# Patient Record
Sex: MALE | Race: BLACK OR AFRICAN AMERICAN | NOT HISPANIC OR LATINO | URBAN - METROPOLITAN AREA
[De-identification: names, ages, dates, MRNs, and addresses within clinical notes are randomized per-mention and may not be internally consistent; named-entity substitution may affect disease eponyms.]

---

## 2022-06-15 ENCOUNTER — INPATIENT (INPATIENT)
Facility: HOSPITAL | Age: 52
LOS: 5 days | Discharge: HOME | DRG: 291 | End: 2022-06-21
Attending: STUDENT IN AN ORGANIZED HEALTH CARE EDUCATION/TRAINING PROGRAM | Admitting: HOSPITALIST
Payer: SELF-PAY

## 2022-06-15 VITALS
TEMPERATURE: 98 F | OXYGEN SATURATION: 98 % | RESPIRATION RATE: 18 BRPM | HEART RATE: 101 BPM | SYSTOLIC BLOOD PRESSURE: 147 MMHG | WEIGHT: 240.08 LBS | DIASTOLIC BLOOD PRESSURE: 106 MMHG

## 2022-06-15 DIAGNOSIS — E78.5 HYPERLIPIDEMIA, UNSPECIFIED: ICD-10-CM

## 2022-06-15 DIAGNOSIS — I10 ESSENTIAL (PRIMARY) HYPERTENSION: ICD-10-CM

## 2022-06-15 DIAGNOSIS — Z02.9 ENCOUNTER FOR ADMINISTRATIVE EXAMINATIONS, UNSPECIFIED: ICD-10-CM

## 2022-06-15 DIAGNOSIS — N17.9 ACUTE KIDNEY FAILURE, UNSPECIFIED: ICD-10-CM

## 2022-06-15 DIAGNOSIS — I50.23 ACUTE ON CHRONIC SYSTOLIC (CONGESTIVE) HEART FAILURE: ICD-10-CM

## 2022-06-15 DIAGNOSIS — Z98.890 OTHER SPECIFIED POSTPROCEDURAL STATES: Chronic | ICD-10-CM

## 2022-06-15 LAB
ALBUMIN SERPL ELPH-MCNC: 3.5 G/DL — SIGNIFICANT CHANGE UP (ref 3.3–5.2)
ALP SERPL-CCNC: 96 U/L — SIGNIFICANT CHANGE UP (ref 40–120)
ALT FLD-CCNC: 60 U/L — HIGH
ANION GAP SERPL CALC-SCNC: 10 MMOL/L — SIGNIFICANT CHANGE UP (ref 5–17)
ANION GAP SERPL CALC-SCNC: 11 MMOL/L — SIGNIFICANT CHANGE UP (ref 5–17)
ANISOCYTOSIS BLD QL: SLIGHT — SIGNIFICANT CHANGE UP
APTT BLD: 28.6 SEC — SIGNIFICANT CHANGE UP (ref 27.5–35.5)
AST SERPL-CCNC: 42 U/L — HIGH
BASOPHILS # BLD AUTO: 0.08 K/UL — SIGNIFICANT CHANGE UP (ref 0–0.2)
BASOPHILS NFR BLD AUTO: 0.9 % — SIGNIFICANT CHANGE UP (ref 0–2)
BILIRUB SERPL-MCNC: 1 MG/DL — SIGNIFICANT CHANGE UP (ref 0.4–2)
BUN SERPL-MCNC: 26.2 MG/DL — HIGH (ref 8–20)
BUN SERPL-MCNC: 28.8 MG/DL — HIGH (ref 8–20)
CALCIUM SERPL-MCNC: 8.2 MG/DL — LOW (ref 8.6–10.2)
CALCIUM SERPL-MCNC: 8.6 MG/DL — SIGNIFICANT CHANGE UP (ref 8.6–10.2)
CHLORIDE SERPL-SCNC: 100 MMOL/L — SIGNIFICANT CHANGE UP (ref 98–107)
CHLORIDE SERPL-SCNC: 102 MMOL/L — SIGNIFICANT CHANGE UP (ref 98–107)
CO2 SERPL-SCNC: 28 MMOL/L — SIGNIFICANT CHANGE UP (ref 22–29)
CO2 SERPL-SCNC: 31 MMOL/L — HIGH (ref 22–29)
CREAT SERPL-MCNC: 1.52 MG/DL — HIGH (ref 0.5–1.3)
CREAT SERPL-MCNC: 1.53 MG/DL — HIGH (ref 0.5–1.3)
EGFR: 55 ML/MIN/1.73M2 — LOW
EGFR: 55 ML/MIN/1.73M2 — LOW
EOSINOPHIL # BLD AUTO: 0.23 K/UL — SIGNIFICANT CHANGE UP (ref 0–0.5)
EOSINOPHIL NFR BLD AUTO: 2.7 % — SIGNIFICANT CHANGE UP (ref 0–6)
GIANT PLATELETS BLD QL SMEAR: PRESENT — SIGNIFICANT CHANGE UP
GLUCOSE SERPL-MCNC: 125 MG/DL — HIGH (ref 70–99)
GLUCOSE SERPL-MCNC: 97 MG/DL — SIGNIFICANT CHANGE UP (ref 70–99)
HCT VFR BLD CALC: 41.8 % — SIGNIFICANT CHANGE UP (ref 39–50)
HGB BLD-MCNC: 13.5 G/DL — SIGNIFICANT CHANGE UP (ref 13–17)
HIV 1 & 2 AB SERPL IA.RAPID: SIGNIFICANT CHANGE UP
INR BLD: 1.83 RATIO — HIGH (ref 0.88–1.16)
LYMPHOCYTES # BLD AUTO: 2.26 K/UL — SIGNIFICANT CHANGE UP (ref 1–3.3)
LYMPHOCYTES # BLD AUTO: 26.8 % — SIGNIFICANT CHANGE UP (ref 13–44)
MACROCYTES BLD QL: SLIGHT — SIGNIFICANT CHANGE UP
MAGNESIUM SERPL-MCNC: 2.1 MG/DL — SIGNIFICANT CHANGE UP (ref 1.6–2.6)
MANUAL SMEAR VERIFICATION: SIGNIFICANT CHANGE UP
MCHC RBC-ENTMCNC: 23.9 PG — LOW (ref 27–34)
MCHC RBC-ENTMCNC: 32.3 GM/DL — SIGNIFICANT CHANGE UP (ref 32–36)
MCV RBC AUTO: 74 FL — LOW (ref 80–100)
MICROCYTES BLD QL: SLIGHT — SIGNIFICANT CHANGE UP
MONOCYTES # BLD AUTO: 0.52 K/UL — SIGNIFICANT CHANGE UP (ref 0–0.9)
MONOCYTES NFR BLD AUTO: 6.2 % — SIGNIFICANT CHANGE UP (ref 2–14)
NEUTROPHILS # BLD AUTO: 5.19 K/UL — SIGNIFICANT CHANGE UP (ref 1.8–7.4)
NEUTROPHILS NFR BLD AUTO: 61.6 % — SIGNIFICANT CHANGE UP (ref 43–77)
NT-PROBNP SERPL-SCNC: 9830 PG/ML — HIGH (ref 0–300)
PLAT MORPH BLD: NORMAL — SIGNIFICANT CHANGE UP
PLATELET # BLD AUTO: 288 K/UL — SIGNIFICANT CHANGE UP (ref 150–400)
POLYCHROMASIA BLD QL SMEAR: SIGNIFICANT CHANGE UP
POTASSIUM SERPL-MCNC: 3.3 MMOL/L — LOW (ref 3.5–5.3)
POTASSIUM SERPL-MCNC: 3.6 MMOL/L — SIGNIFICANT CHANGE UP (ref 3.5–5.3)
POTASSIUM SERPL-SCNC: 3.3 MMOL/L — LOW (ref 3.5–5.3)
POTASSIUM SERPL-SCNC: 3.6 MMOL/L — SIGNIFICANT CHANGE UP (ref 3.5–5.3)
PROT SERPL-MCNC: 6.5 G/DL — LOW (ref 6.6–8.7)
PROTHROM AB SERPL-ACNC: 21.3 SEC — HIGH (ref 10.5–13.4)
RAPID RVP RESULT: SIGNIFICANT CHANGE UP
RBC # BLD: 5.65 M/UL — SIGNIFICANT CHANGE UP (ref 4.2–5.8)
RBC # FLD: 16.7 % — HIGH (ref 10.3–14.5)
RBC BLD AUTO: NORMAL — SIGNIFICANT CHANGE UP
SARS-COV-2 RNA SPEC QL NAA+PROBE: SIGNIFICANT CHANGE UP
SMUDGE CELLS # BLD: PRESENT — SIGNIFICANT CHANGE UP
SODIUM SERPL-SCNC: 141 MMOL/L — SIGNIFICANT CHANGE UP (ref 135–145)
SODIUM SERPL-SCNC: 141 MMOL/L — SIGNIFICANT CHANGE UP (ref 135–145)
TROPONIN T SERPL-MCNC: 0.01 NG/ML — SIGNIFICANT CHANGE UP (ref 0–0.06)
TROPONIN T SERPL-MCNC: 0.01 NG/ML — SIGNIFICANT CHANGE UP (ref 0–0.06)
VARIANT LYMPHS # BLD: 1.8 % — SIGNIFICANT CHANGE UP (ref 0–6)
WBC # BLD: 8.42 K/UL — SIGNIFICANT CHANGE UP (ref 3.8–10.5)
WBC # FLD AUTO: 8.42 K/UL — SIGNIFICANT CHANGE UP (ref 3.8–10.5)

## 2022-06-15 PROCEDURE — 93010 ELECTROCARDIOGRAM REPORT: CPT

## 2022-06-15 PROCEDURE — 99223 1ST HOSP IP/OBS HIGH 75: CPT

## 2022-06-15 PROCEDURE — 99053 MED SERV 10PM-8AM 24 HR FAC: CPT

## 2022-06-15 PROCEDURE — 71046 X-RAY EXAM CHEST 2 VIEWS: CPT | Mod: 26

## 2022-06-15 PROCEDURE — 99285 EMERGENCY DEPT VISIT HI MDM: CPT

## 2022-06-15 RX ORDER — METOPROLOL TARTRATE 50 MG
1 TABLET ORAL
Qty: 0 | Refills: 0 | DISCHARGE

## 2022-06-15 RX ORDER — METOPROLOL TARTRATE 50 MG
50 TABLET ORAL DAILY
Refills: 0 | Status: DISCONTINUED | OUTPATIENT
Start: 2022-06-15 | End: 2022-06-21

## 2022-06-15 RX ORDER — FAMOTIDINE 10 MG/ML
1 INJECTION INTRAVENOUS
Qty: 0 | Refills: 0 | DISCHARGE

## 2022-06-15 RX ORDER — ASPIRIN/CALCIUM CARB/MAGNESIUM 324 MG
1 TABLET ORAL
Qty: 0 | Refills: 0 | DISCHARGE

## 2022-06-15 RX ORDER — FUROSEMIDE 40 MG
80 TABLET ORAL ONCE
Refills: 0 | Status: COMPLETED | OUTPATIENT
Start: 2022-06-15 | End: 2022-06-15

## 2022-06-15 RX ORDER — SACUBITRIL AND VALSARTAN 24; 26 MG/1; MG/1
1 TABLET, FILM COATED ORAL EVERY 12 HOURS
Refills: 0 | Status: DISCONTINUED | OUTPATIENT
Start: 2022-06-15 | End: 2022-06-20

## 2022-06-15 RX ORDER — FAMOTIDINE 10 MG/ML
20 INJECTION INTRAVENOUS
Refills: 0 | Status: DISCONTINUED | OUTPATIENT
Start: 2022-06-15 | End: 2022-06-21

## 2022-06-15 RX ORDER — ATORVASTATIN CALCIUM 80 MG/1
1 TABLET, FILM COATED ORAL
Qty: 0 | Refills: 0 | DISCHARGE

## 2022-06-15 RX ORDER — SACUBITRIL AND VALSARTAN 24; 26 MG/1; MG/1
1 TABLET, FILM COATED ORAL
Refills: 0 | Status: DISCONTINUED | OUTPATIENT
Start: 2022-06-15 | End: 2022-06-15

## 2022-06-15 RX ORDER — ATORVASTATIN CALCIUM 80 MG/1
40 TABLET, FILM COATED ORAL AT BEDTIME
Refills: 0 | Status: DISCONTINUED | OUTPATIENT
Start: 2022-06-15 | End: 2022-06-21

## 2022-06-15 RX ORDER — PANTOPRAZOLE SODIUM 20 MG/1
40 TABLET, DELAYED RELEASE ORAL
Refills: 0 | Status: DISCONTINUED | OUTPATIENT
Start: 2022-06-15 | End: 2022-06-21

## 2022-06-15 RX ORDER — FUROSEMIDE 40 MG
80 TABLET ORAL
Refills: 0 | Status: DISCONTINUED | OUTPATIENT
Start: 2022-06-15 | End: 2022-06-17

## 2022-06-15 RX ORDER — ASPIRIN/CALCIUM CARB/MAGNESIUM 324 MG
325 TABLET ORAL ONCE
Refills: 0 | Status: COMPLETED | OUTPATIENT
Start: 2022-06-15 | End: 2022-06-15

## 2022-06-15 RX ORDER — ATORVASTATIN CALCIUM 80 MG/1
40 TABLET, FILM COATED ORAL DAILY
Refills: 0 | Status: DISCONTINUED | OUTPATIENT
Start: 2022-06-15 | End: 2022-06-15

## 2022-06-15 RX ORDER — FAMOTIDINE 10 MG/ML
20 INJECTION INTRAVENOUS
Refills: 0 | Status: DISCONTINUED | OUTPATIENT
Start: 2022-06-15 | End: 2022-06-15

## 2022-06-15 RX ORDER — POTASSIUM CHLORIDE 20 MEQ
40 PACKET (EA) ORAL ONCE
Refills: 0 | Status: COMPLETED | OUTPATIENT
Start: 2022-06-15 | End: 2022-06-15

## 2022-06-15 RX ORDER — METOPROLOL TARTRATE 50 MG
5 TABLET ORAL ONCE
Refills: 0 | Status: COMPLETED | OUTPATIENT
Start: 2022-06-15 | End: 2022-06-15

## 2022-06-15 RX ORDER — ASPIRIN/CALCIUM CARB/MAGNESIUM 324 MG
81 TABLET ORAL DAILY
Refills: 0 | Status: DISCONTINUED | OUTPATIENT
Start: 2022-06-15 | End: 2022-06-21

## 2022-06-15 RX ORDER — SACUBITRIL AND VALSARTAN 24; 26 MG/1; MG/1
1 TABLET, FILM COATED ORAL ONCE
Refills: 0 | Status: COMPLETED | OUTPATIENT
Start: 2022-06-15 | End: 2022-06-15

## 2022-06-15 RX ADMIN — PANTOPRAZOLE SODIUM 40 MILLIGRAM(S): 20 TABLET, DELAYED RELEASE ORAL at 13:16

## 2022-06-15 RX ADMIN — Medication 81 MILLIGRAM(S): at 13:15

## 2022-06-15 RX ADMIN — Medication 80 MILLIGRAM(S): at 13:16

## 2022-06-15 RX ADMIN — Medication 80 MILLIGRAM(S): at 04:36

## 2022-06-15 RX ADMIN — FAMOTIDINE 20 MILLIGRAM(S): 10 INJECTION INTRAVENOUS at 09:29

## 2022-06-15 RX ADMIN — Medication 50 MILLIGRAM(S): at 09:29

## 2022-06-15 RX ADMIN — SACUBITRIL AND VALSARTAN 1 TABLET(S): 24; 26 TABLET, FILM COATED ORAL at 21:07

## 2022-06-15 RX ADMIN — Medication 40 MILLIEQUIVALENT(S): at 04:36

## 2022-06-15 RX ADMIN — SACUBITRIL AND VALSARTAN 1 TABLET(S): 24; 26 TABLET, FILM COATED ORAL at 13:15

## 2022-06-15 RX ADMIN — Medication 325 MILLIGRAM(S): at 04:35

## 2022-06-15 RX ADMIN — ATORVASTATIN CALCIUM 40 MILLIGRAM(S): 80 TABLET, FILM COATED ORAL at 21:07

## 2022-06-15 RX ADMIN — Medication 5 MILLIGRAM(S): at 23:43

## 2022-06-15 NOTE — ED PROVIDER NOTE - CARE PLAN
1 Principal Discharge DX:	Acute on chronic systolic congestive heart failure  Secondary Diagnosis:	Mild renal insufficiency

## 2022-06-15 NOTE — ED ADULT TRIAGE NOTE - CHIEF COMPLAINT QUOTE
pt c/o swollen testicles and pain, pt states he takes water pills and feel like his water pills are not working, hx of HTN

## 2022-06-15 NOTE — ED ADULT NURSE REASSESSMENT NOTE - NS ED NURSE REASSESS COMMENT FT1
Dr. Betancourt at bedside to evaluate patient.  Dr. Betancourt made aware of hypertension and c/o GERD.

## 2022-06-15 NOTE — CONSULT NOTE ADULT - SUBJECTIVE AND OBJECTIVE BOX
[facility], THE [facility]ART [facility]ENTER                                   [address] [place] [address]                                                      PHONE: [phone]                                                         FAX: [phone]  [email or web address]  ---------------------------------------------------------------------------------------------------------------------------------    Reason for Consult: SOB LO LE EDEMA    HPI:  JENNIFER BARRIENTOS is an 52y Male with recently diagnosed     PAST MEDICAL & SURGICAL HISTORY:  CHF, chronic      Hypertension      Hyperlipidemia      S/P hernia repair          No Known Allergies      MEDICATIONS  (STANDING):  aspirin enteric coated 81 milliGRAM(s) Oral daily  atorvastatin 40 milliGRAM(s) Oral at bedtime  furosemide   Injectable 80 milliGRAM(s) IV Push two times a day  metoprolol succinate ER 50 milliGRAM(s) Oral daily  pantoprazole    Tablet 40 milliGRAM(s) Oral before breakfast  sacubitril 24 mG/valsartan 26 mG 1 Tablet(s) Oral two times a day    MEDICATIONS  (PRN):  famotidine Injectable 20 milliGRAM(s) IV Push two times a day PRN dyspepsia      Social History:  Cigarettes:                    Alchohol:                 Illicit Drug Abuse:      REVIEW OF SYSTEMS:    Constitutional: No fever, weight loss or fatigue  Eyes: No eye pain, visual disturbances, or discharge  ENMT:  No difficulty hearing, tinnitus, vertigo; No sinus or throat pain  Neck: No pain or stiffness  Respiratory: No cough, wheezing, chills or hemoptysis  Cardiovascular: No chest pain, palpitations, shortness of breath, dizziness or leg swelling  Gastrointestinal: No abdominal or epigastric pain. No nausea, vomiting or hematemesis; No diarrhea or constipation. No melena or hematochezia.  Genitourinary: No dysuria, frequency, hematuria or incontinence  Rectal: No pain, hemorrhoids or incontinence  Neurological: No headaches, memory loss, loss of strength, numbness or tremors  Skin: No itching, burning, rashes or lesions   Lymph Nodes: No enlarged glands  Endocrine: No heat or cold intolerance; No hair loss  Musculoskeletal: No joint pain or swelling; No muscle, back or extremity pain  Psychiatric: No depression, anxiety, mood swings or difficulty sleeping  Heme/Lymph: No easy bruising or bleeding gums  Allergy and Immunologic: No hives or eczema    Vital Signs Last 24 Hrs  T(C): 36.9 (15 Braulio 2022 11:13), Max: 36.9 (15 Braulio 2022 05:22)  T(F): 98.4 (15 Braluio 2022 11:13), Max: 98.4 (15 Braulio 2022 05:22)  HR: 98 (15 Braulio 2022 11:26) (92 - 101)  BP: 143/100 (15 Braulio 2022 11:26) (140/103 - 149/88)  BP(mean): --  RR: 18 (15 Braulio 2022 11:26) (18 - 20)  SpO2: 100% (15 Braulio 2022 11:26) (98% - 100%)  ICU Vital Signs Last 24 Hrs  JENNIFER BARRIENTOS  I&O's Detail    15 Braulio 2022 07:01  -  15 Braulio 2022 11:41  --------------------------------------------------------  IN:  Total IN: 0 mL    OUT:    Voided (mL): 500 mL  Total OUT: 500 mL    Total NET: -500 mL        I&O's Summary    15 Braulio 2022 07:01  -  15 Braulio 2022 11:41  --------------------------------------------------------  IN: 0 mL / OUT: 500 mL / NET: -500 mL      Drug Dosing Weight  JENNIFER BARRIENTOS      PHYSICAL EXAM:  General: Appears alert and cooperative.  HEENT: Head; normocephalic, atraumatic.  Eyes: Pupils reactive, cornea wnl.  Neck: Supple, no nodes adenopathy, no NVD or carotid bruit or thyromegaly.  CARDIOVASCULAR: Normal S1 and S2, No murmur, rub, gallop or lift.   LUNGS: No rales, rhonchi or wheeze. Normal breath sounds bilaterally.  ABDOMEN: Soft, nontender without mass or organomegaly. bowel sounds normoactive.  EXTREMITIES: No clubbing, cyanosis or edema. Distal pulses wnl.   SKIN: warm and dry with normal turgor.  NEURO: Alert/oriented x 3/normal motor exam. No pathologic reflexes.    PSYCH: normal affect.      >>> <<<  LABS:                        13.5   8.42  )-----------( 288      ( 15 Braulio 2022 03:13 )             41.8     06-15    141  |  100  |  26.2<H>  ----------------------------<  97  3.6   |  31.0<H>  |  1.52<H>    Ca    8.6      15 Braulio 2022 09:24  Mg     2.1     06-15    TPro  6.5<L>  /  Alb  3.5  /  TBili  1.0  /  DBili  x   /  AST  42<H>  /  ALT  60<H>  /  AlkPhos  96  06-15    JENNIFER BARRIENTOS  CARDIAC MARKERS ( 15 Braulio 2022 06:32 )  x     / 0.01 ng/mL / x     / x     / x      CARDIAC MARKERS ( 15 Braulio 2022 03:13 )  x     / 0.01 ng/mL / x     / x     / x          PT/INR - ( 15 Braulio 2022 03:13 )   PT: 21.3 sec;   INR: 1.83 ratio         PTT - ( 15 Braulio 2022 03:13 )  PTT:28.6 sec      RADIOLOGY & ADDITIONAL STUDIES:    INTERPRETATION OF TELEMETRY (personally reviewed):    ECG:    ECHO:    STRESS TEST:    CARDIAC CATHETERIZATION:    ACTIVE PROBLEMS:  HEALTH ISSUES - PROBLEM Dx:  Acute on chronic systolic congestive heart failure    Hypertension    HLD (hyperlipidemia)    COLLEEN (acute kidney injury)    Discharge planning issues            Assessment and Plan:    In summary, JENNIFER BARRIENTOS is an 52y Male with past medical history significant for     Telemetry monitoring  Serial cardiac markers and EKgs  2DEchocardiogram  Continue present cardiac therapy    TAL TERRAZAS MD, [facility], [place]                 [facility], [facility]                                   [address] [place] [address]                                                      PHONE: [phone]                                                         FAX: [phone]  [email or web address]  ---------------------------------------------------------------------------------------------------------------------------------    Reason for Consult: SOB LO LE EDEMA    HPI:  JENNIFER BARRIENTOS is an 52y Male with recently diagnosed CMP LVEF 15 % Mod MR Trace AI Mod Sev TR with no significant CAD at [facility]  presenting with worsening SOB LO TESTICULAR AND LE EDEMA.   Patient c/o also of severe reflux symptoms for the last few weeks.   Pt with severe LO only able to walk for only a block he reports no limitations 6 months ago.  Pt was awaiting cardiac MRI as outpatient.      PAST MEDICAL & SURGICAL HISTORY:  CHF, chronic      Hypertension      Hyperlipidemia      S/P hernia repair          No Known Allergies      MEDICATIONS  (STANDING):  aspirin enteric coated 81 milliGRAM(s) Oral daily  atorvastatin 40 milliGRAM(s) Oral at bedtime  furosemide   Injectable 80 milliGRAM(s) IV Push two times a day  metoprolol succinate ER 50 milliGRAM(s) Oral daily  pantoprazole    Tablet 40 milliGRAM(s) Oral before breakfast  sacubitril 24 mG/valsartan 26 mG 1 Tablet(s) Oral two times a day    MEDICATIONS  (PRN):  famotidine Injectable 20 milliGRAM(s) IV Push two times a day PRN dyspepsia      Social History:  Cigarettes:                    Alchohol:                 Illicit Drug Abuse:      REVIEW OF SYSTEMS:    Constitutional: No fever, weight loss or fatigue  Eyes: No eye pain, visual disturbances, or discharge  ENMT:  No difficulty hearing, tinnitus, vertigo; No sinus or throat pain  Neck: No pain or stiffness  Respiratory: No cough, wheezing, chills or hemoptysis  Cardiovascular: No chest pain, palpitations, shortness of breath, dizziness or leg swelling  Gastrointestinal: No abdominal or epigastric pain. No nausea, vomiting or hematemesis; No diarrhea or constipation. No melena or hematochezia.  Genitourinary: No dysuria, frequency, hematuria or incontinence  Rectal: No pain, hemorrhoids or incontinence  Neurological: No headaches, memory loss, loss of strength, numbness or tremors  Skin: No itching, burning, rashes or lesions   Lymph Nodes: No enlarged glands  Endocrine: No heat or cold intolerance; No hair loss  Musculoskeletal: No joint pain or swelling; No muscle, back or extremity pain  Psychiatric: No depression, anxiety, mood swings or difficulty sleeping  Heme/Lymph: No easy bruising or bleeding gums  Allergy and Immunologic: No hives or eczema    Vital Signs Last 24 Hrs  T(C): 36.9 (15 Braulio 2022 11:13), Max: 36.9 (15 Braulio 2022 05:22)  T(F): 98.4 (15 Braulio 2022 11:13), Max: 98.4 (15 Braulio 2022 05:22)  HR: 98 (15 Braulio 2022 11:26) (92 - 101)  BP: 143/100 (15 Braulio 2022 11:26) (140/103 - 149/88)  BP(mean): --  RR: 18 (15 Braulio 2022 11:26) (18 - 20)  SpO2: 100% (15 Braulio 2022 11:26) (98% - 100%)  ICU Vital Signs Last 24 Hrs  JENNIFER BARRIENTOS  I&O's Detail    15 Braulio 2022 07:01  -  15 Braulio 2022 11:41  --------------------------------------------------------  IN:  Total IN: 0 mL    OUT:    Voided (mL): 500 mL  Total OUT: 500 mL    Total NET: -500 mL        I&O's Summary    15 Braulio 2022 07:01  -  15 Braulio 2022 11:41  --------------------------------------------------------  IN: 0 mL / OUT: 500 mL / NET: -500 mL      Drug Dosing Weight  JENNIFER OZUNAER      PHYSICAL EXAM:  General: Appears alert and cooperative.  HEENT: Head; normocephalic, atraumatic.  Eyes: Pupils reactive, cornea wnl.  Neck: Supple, no nodes adenopathy, no NVD or carotid bruit or thyromegaly.  CARDIOVASCULAR: Normal S1 and S2, No murmur, rub, gallop or lift.   LUNGS: No rales, rhonchi or wheeze. Normal breath sounds bilaterally.  ABDOMEN: Soft, nontender without mass or organomegaly. bowel sounds normoactive.  EXTREMITIES: No clubbing, cyanosis or edema. Distal pulses wnl.   SKIN: warm and dry with normal turgor.  NEURO: Alert/oriented x 3/normal motor exam. No pathologic reflexes.    PSYCH: normal affect.      >>> <<<  LABS:                        13.5   8.42  )-----------( 288      ( 15 Braulio 2022 03:13 )             41.8     06-15    141  |  100  |  26.2<H>  ----------------------------<  97  3.6   |  31.0<H>  |  1.52<H>    Ca    8.6      15 Braulio 2022 09:24  Mg     2.1     06-15    TPro  6.5<L>  /  Alb  3.5  /  TBili  1.0  /  DBili  x   /  AST  42<H>  /  ALT  60<H>  /  AlkPhos  96  06-15    JENNIFER BARRIENTOS  CARDIAC MARKERS ( 15 Braulio 2022 06:32 )  x     / 0.01 ng/mL / x     / x     / x      CARDIAC MARKERS ( 15 Braulio 2022 03:13 )  x     / 0.01 ng/mL / x     / x     / x          PT/INR - ( 15 Braulio 2022 03:13 )   PT: 21.3 sec;   INR: 1.83 ratio         PTT - ( 15 Braulio 2022 03:13 )  PTT:28.6 sec      RADIOLOGY & ADDITIONAL STUDIES:    INTERPRETATION OF TELEMETRY (personally reviewed):    ECG: NA      ACTIVE PROBLEMS:  HEALTH ISSUES - PROBLEM Dx:  Acute on chronic systolic congestive heart failure    Hypertension    HLD (hyperlipidemia)    COLLEEN (acute kidney injury)    Discharge planning issues            Assessment and Plan:    In summary  JENNIFER BARRIENTOS is an 52y Male with recently diagnosed CMP LVEF 15 % Mod MR Trace AI Mod Sev TR with no significant CAD at [facility]  presenting with worsening SOB LO TESTICULAR AND LE EDEMA.   Patient c/o also of severe reflux symptoms for the last few weeks.   Pt with severe LO only able to walk for only a block he reports no limitations 6 months ago.  Pt was awaiting cardiac MRI as outpatient.      Telemetry monitoring  IV diuresis lytes replacement   Will get records from [facility]  Cardiac MRI  Will need life vest prior to DC    TAL TERRAZAS MD, FACC, NELIA                 [facility], THE [facility]ART [facility]                                   [address] [place] [address]                                                      PHONE: [phone]                                                         FAX: [phone]  [email or web address]  ---------------------------------------------------------------------------------------------------------------------------------    Reason for Consult: SOB LO LE EDEMA    HPI:  JENNIFER BARRIENTOS is an 52y Male with recently diagnosed CMP LVEF 15 % Mod MR Trace AI Mod Sev TR with no significant CAD at [facility]  presenting with worsening SOB LO TESTICULAR AND LE EDEMA.   Patient c/o also of severe reflux symptoms for the last few weeks.   Pt with severe LO only able to walk for only a block he reports no limitations 6 months ago.  Pt was awaiting cardiac MRI as outpatient.      PAST MEDICAL & SURGICAL HISTORY:  CHF, chronic      Hypertension      Hyperlipidemia      S/P hernia repair          No Known Allergies      MEDICATIONS  (STANDING):  aspirin enteric coated 81 milliGRAM(s) Oral daily  atorvastatin 40 milliGRAM(s) Oral at bedtime  furosemide   Injectable 80 milliGRAM(s) IV Push two times a day  metoprolol succinate ER 50 milliGRAM(s) Oral daily  pantoprazole    Tablet 40 milliGRAM(s) Oral before breakfast  sacubitril 24 mG/valsartan 26 mG 1 Tablet(s) Oral two times a day    MEDICATIONS  (PRN):  famotidine Injectable 20 milliGRAM(s) IV Push two times a day PRN dyspepsia      Social History:  Cigarettes:                    Alchohol:                 Illicit Drug Abuse:      REVIEW OF SYSTEMS:    Constitutional: No fever, weight loss or fatigue  Eyes: No eye pain, visual disturbances, or discharge  ENMT:  No difficulty hearing, tinnitus, vertigo; No sinus or throat pain  Neck: No pain or stiffness  Respiratory: No cough, wheezing, chills or hemoptysis  Cardiovascular: No chest pain, palpitations, shortness of breath, dizziness or leg swelling  Gastrointestinal: No abdominal or epigastric pain. No nausea, vomiting or hematemesis; No diarrhea or constipation. No melena or hematochezia.  Genitourinary: No dysuria, frequency, hematuria or incontinence  Rectal: No pain, hemorrhoids or incontinence  Neurological: No headaches, memory loss, loss of strength, numbness or tremors  Skin: No itching, burning, rashes or lesions   Lymph Nodes: No enlarged glands  Endocrine: No heat or cold intolerance; No hair loss  Musculoskeletal: No joint pain or swelling; No muscle, back or extremity pain  Psychiatric: No depression, anxiety, mood swings or difficulty sleeping  Heme/Lymph: No easy bruising or bleeding gums  Allergy and Immunologic: No hives or eczema    Vital Signs Last 24 Hrs  T(C): 36.9 (15 Braulio 2022 11:13), Max: 36.9 (15 Braulio 2022 05:22)  T(F): 98.4 (15 Braulio 2022 11:13), Max: 98.4 (15 Braulio 2022 05:22)  HR: 98 (15 Braulio 2022 11:26) (92 - 101)  BP: 143/100 (15 Braulio 2022 11:26) (140/103 - 149/88)  BP(mean): --  RR: 18 (15 Braulio 2022 11:26) (18 - 20)  SpO2: 100% (15 Braulio 2022 11:26) (98% - 100%)  ICU Vital Signs Last 24 Hrs  JENNIFER BARRIENTOS  I&O's Detail    15 Braulio 2022 07:01  -  15 Braulio 2022 11:41  --------------------------------------------------------  IN:  Total IN: 0 mL    OUT:    Voided (mL): 500 mL  Total OUT: 500 mL    Total NET: -500 mL        I&O's Summary    15 Braulio 2022 07:01  -  15 Braulio 2022 11:41  --------------------------------------------------------  IN: 0 mL / OUT: 500 mL / NET: -500 mL      Drug Dosing Weight  JENNIFER OZUNAER      PHYSICAL EXAM:  General: Appears alert and cooperative.  HEENT: Head; normocephalic, atraumatic.  Eyes: Pupils reactive, cornea wnl.  Neck: Supple, no nodes adenopathy, no NVD or carotid bruit or thyromegaly.  CARDIOVASCULAR: Normal S1 and S2, No murmur, rub, gallop or lift.   LUNGS: No rales, rhonchi or wheeze. Normal breath sounds bilaterally.  ABDOMEN: Soft, nontender without mass or organomegaly. bowel sounds normoactive.  EXTREMITIES: No clubbing, cyanosis or edema. Distal pulses wnl.   SKIN: warm and dry with normal turgor.  NEURO: Alert/oriented x 3/normal motor exam. No pathologic reflexes.    PSYCH: normal affect.      >>> <<<  LABS:                        13.5   8.42  )-----------( 288      ( 15 Braulio 2022 03:13 )             41.8     06-15    141  |  100  |  26.2<H>  ----------------------------<  97  3.6   |  31.0<H>  |  1.52<H>    Ca    8.6      15 Braulio 2022 09:24  Mg     2.1     06-15    TPro  6.5<L>  /  Alb  3.5  /  TBili  1.0  /  DBili  x   /  AST  42<H>  /  ALT  60<H>  /  AlkPhos  96  06-15    JENNIFER BARRIENTOS  CARDIAC MARKERS ( 15 Braulio 2022 06:32 )  x     / 0.01 ng/mL / x     / x     / x      CARDIAC MARKERS ( 15 Braulio 2022 03:13 )  x     / 0.01 ng/mL / x     / x     / x          PT/INR - ( 15 Braulio 2022 03:13 )   PT: 21.3 sec;   INR: 1.83 ratio         PTT - ( 15 Braulio 2022 03:13 )  PTT:28.6 sec      RADIOLOGY & ADDITIONAL STUDIES:    INTERPRETATION OF TELEMETRY (personally reviewed):    ECG: NA      ACTIVE PROBLEMS:  HEALTH ISSUES - PROBLEM Dx:  Acute on chronic systolic congestive heart failure    Hypertension    HLD (hyperlipidemia)    COLLEEN (acute kidney injury)    Discharge planning issues            Assessment and Plan:    In summary  JENNIFER BARRIENTOS is an 52y Male with recently diagnosed CMP LVEF 15 % Mod MR Trace AI Mod Sev TR with no significant CAD at [facility]  presenting with worsening SOB LO TESTICULAR AND LE EDEMA.   Patient c/o also of severe reflux symptoms for the last few weeks.   Pt with severe LO only able to walk for only a block he reports no limitations 6 months ago.  Pt was awaiting cardiac MRI as outpatient.      Telemetry monitoring  IV diuresis lytes replacement   Will get records from [facility]  Cardiac MRI  Cont present cardiac therapy for his CMP, compliance discussed in detail with Patient and wife at the ER  Will need life vest prior to DC    TAL TERRAZAS MD, FACC, [place]ELIA

## 2022-06-15 NOTE — ED ADULT NURSE REASSESSMENT NOTE - NS ED NURSE REASSESS COMMENT FT1
Received report and assumed pt care at  0745.  Pt is alert and oriented X 4.  Pt states "I have urinated at least 15 times today"  and states he testicular swelling has decreased significantly. Pt c/o GERD symptoms.  Hypertensive this morning 145/107.  Call placed to Dr. Betancourt to get orders for regular BP and GERD meds.  Awaiting call back.

## 2022-06-15 NOTE — H&P ADULT - PROBLEM SELECTOR PLAN 2
Unclear what baseline kidney function is.    Continue IV diuresis and monitor closely.    Could be secondary due to underlying heart failure

## 2022-06-15 NOTE — H&P ADULT - ASSESSMENT
52 yr old M w/a PMH of HTN, HLD and new systolic congestive heart failure presents with swelling and edema

## 2022-06-15 NOTE — H&P ADULT - HISTORY OF PRESENT ILLNESS
52 yr old M w/a PMH of HTN, HLD and recently diagnosed congestive heart failure.  Patient reports a month and half ago he went to the urgent care for shortness of breath.  He was given a course of antibiotics and steroids.  After the course he began noticing significant swelling in his lower extremities along with associated shortness of breath.  He went to [facility] for evaluation and was found to have a low ejection fraction, self reported of 15%.  He reports a cardiac cath was done that revealed no ischemic disease.  Since discharge from [facility] he states he has been following up with his cardiologist and awaiting a lifevest.  He reports frustration with the care as he felt as if nothing was getting better.  He came to the ED for evaluation because not he has noticed testicular swelling as well.  He states his ability to walk without shortness of breath has worsened.  He reports orthopnea and PND.

## 2022-06-15 NOTE — ED PROVIDER NOTE - OBJECTIVE STATEMENT
52 year old male with history of HTN, HLD, newly diagnosed CHF who presents with swelling. About 1 month ago patient began to have dyspnea for which he was given steroids. About 1 week later however he developed leg swelling and when he was re-evaluated he was started on lasix. Most recently was hospitalized at [facility] where he received IV diuretics and his leg swelling significantly improved. Per patient he had an ECHO which showed decreased EF and a LHC which was normal. Since discharge he has on lasix and entresto but "it's not working." States his legs have been more swollen and now the edema has spread up to his testicle which "is the size of a coconut." No dyspnea, chest pain, fevers, chills, or other complaints. Follows with cardiologist Dr. Lozada.

## 2022-06-15 NOTE — ED PROVIDER NOTE - NS ED ROS FT
Constitutional: no fever, no chills  Head: NC, AT   Eyes: no redness   ENMT: no nasal congestion/drainage, no sore throat   CV: no chest pain, +edema  Resp: no cough, no dyspnea  GI: no abdominal pain, no nausea, no vomiting, no diarrhea, no constipation   : no dysuria, no hematuria, +edema, no retention   Skin: no lesions, no rashes   Neuro: no LOC, no headache, no sensory deficits, no weakness

## 2022-06-15 NOTE — PATIENT PROFILE ADULT - FALL HARM RISK - UNIVERSAL INTERVENTIONS
Bed in lowest position, wheels locked, appropriate side rails in place/Call bell, personal items and telephone in reach/Instruct patient to call for assistance before getting out of bed or chair/Non-slip footwear when patient is out of bed/Christine to call system/Physically safe environment - no spills, clutter or unnecessary equipment/Purposeful Proactive Rounding/Room/bathroom lighting operational, light cord in reach

## 2022-06-15 NOTE — ED PROVIDER NOTE - PHYSICAL EXAMINATION
General: well appearing, NAD  Head: NC, AT  EENT: EOMI, no scleral icterus  Cardiac: RRR, no apparent murmurs, 3+ pitting lower extremity edema  Respiratory: CTA anteriorly, no respiratory distress   Abdomen: slightly distended, somewhat firm but NT, nonperitonitic  MSK/Vascular: full ROM, soft compartments, warm extremities  Neuro: AAOx3, sensation to light touch intact  Psych: calm, cooperative

## 2022-06-15 NOTE — H&P ADULT - NSHPLABSRESULTS_GEN_ALL_CORE
LABS:                         13.5   8.42  )-----------( 288      ( 15 Braulio 2022 03:13 )             41.8     06-15    141  |  102  |  28.8<H>  ----------------------------<  125<H>  3.3<L>   |  28.0  |  1.53<H>    Ca    8.2<L>      15 Braulio 2022 03:13  Mg     2.1     06-15    TPro  6.5<L>  /  Alb  3.5  /  TBili  1.0  /  DBili  x   /  AST  42<H>  /  ALT  60<H>  /  AlkPhos  96  06-15    PT/INR - ( 15 Braulio 2022 03:13 )   PT: 21.3 sec;   INR: 1.83 ratio         PTT - ( 15 Braulio 2022 03:13 )  PTT:28.6 sec    CARDIAC MARKERS ( 15 Braulio 2022 06:32 )  x     / 0.01 ng/mL / x     / x     / x      CARDIAC MARKERS ( 15 Braulio 2022 03:13 )  x     / 0.01 ng/mL / x     / x     / x          Serum Pro-Brain Natriuretic Peptide: 9830 pg/mL (06-15 @ 03:13)      Records reviewed from prior hospitalization.  Labs reviewed remarkable for   EKG personally reviewed   CXR personally reviewed

## 2022-06-15 NOTE — ED ADULT TRIAGE NOTE - INTERNATIONAL TRAVEL
Verified Results  COMP METABOLIC PANEL WITH LIPID PANEL (CPNL,LIPDPL) [id number] 08:25AM POKHARNA, MANDAKINI     Test Name Result Flag Reference   SODIUM 142 mmol/L  135-145   POTASSIUM 4.5 mmol/L  3.4-5.1   CHLORIDE 107 mmol/L     CARBON DIOXIDE 26 mmol/L  21-32   ANION GAP 14 mmol/L  10-20   GLUCOSE 91 mg/dl  65-99   BUN 20 mg/dl  6-20   CREATININE 0.88 mg/dl  0.51-0.95   GFR EST.AFRICAN AMER 78     eGFR 60 - 89 mL/min/1.73m2 = Mild decrease in kidney function.   GFR EST.NONAFRI AMER 67     eGFR 60 - 89 mL/min/1.73m2 = Mild decrease in kidney function.   BUN/CREATININE RATIO 23  7-25   BILIRUBIN TOTAL 0.5 mg/dl  0.2-1.0   GOT/AST 18 Units/L  <38   ALKALINE PHOSPHATASE 66 Units/L     ALBUMIN 4.0 g/dl  3.6-5.1   TOTAL PROTEIN 6.7 g/dl  6.4-8.2   GLOBULIN (CALCULATED) 2.7 g/dl  2.0-4.0   A/G RATIO 1.5  1.0-2.4   CALCIUM 9.1 mg/dl  8.4-10.2   GPT/ALT 21 Units/L  <79   FASTING STATUS UNKNOWN hrs     CHOLESTEROL 203 mg/dl H <200   Desirable            <200  Borderline High      200 to 239  High                 >=240   HDL CHOLESTEROL 58 mg/dl  >49   Low            <40  Borderline Low 40 to 49  Near Optimal   50 to 59  Optimal        >=60   TRIGLYCERIDES 140 mg/dl  <150   Normal                   <150  Borderline High          150 to 199  High                     200 to 499  Very High                >=500   LDL CHOLESTEROL (CALCULATED) 117 mg/dl  <130   OPTIMAL               <100  NEAR OPTIMAL          100-129  BORDERLINE HIGH       130-159  HIGH                  160-189  VERY HIGH             >=190   NON-HDL CHOLESTEROL 145 mg/dl     Therapeutic Target:  CHD and risk equivalents <130  Multiple risk factors    <160  0 to 1 risk factors      <190   CHOLESTEROL/HDL RATIO 3.5  <4.5   FASTING STATUS UNKNOWN hrs          No

## 2022-06-16 LAB
A1C WITH ESTIMATED AVERAGE GLUCOSE RESULT: 5.5 % — SIGNIFICANT CHANGE UP (ref 4–5.6)
ALBUMIN SERPL ELPH-MCNC: 3.1 G/DL — LOW (ref 3.3–5.2)
ALP SERPL-CCNC: 88 U/L — SIGNIFICANT CHANGE UP (ref 40–120)
ALT FLD-CCNC: 51 U/L — HIGH
ANION GAP SERPL CALC-SCNC: 11 MMOL/L — SIGNIFICANT CHANGE UP (ref 5–17)
AST SERPL-CCNC: 34 U/L — SIGNIFICANT CHANGE UP
BILIRUB SERPL-MCNC: 0.8 MG/DL — SIGNIFICANT CHANGE UP (ref 0.4–2)
BUN SERPL-MCNC: 29.2 MG/DL — HIGH (ref 8–20)
CALCIUM SERPL-MCNC: 8.4 MG/DL — LOW (ref 8.6–10.2)
CHLORIDE SERPL-SCNC: 103 MMOL/L — SIGNIFICANT CHANGE UP (ref 98–107)
CO2 SERPL-SCNC: 31 MMOL/L — HIGH (ref 22–29)
CREAT SERPL-MCNC: 1.56 MG/DL — HIGH (ref 0.5–1.3)
EGFR: 53 ML/MIN/1.73M2 — LOW
ESTIMATED AVERAGE GLUCOSE: 111 MG/DL — SIGNIFICANT CHANGE UP (ref 68–114)
GLUCOSE SERPL-MCNC: 82 MG/DL — SIGNIFICANT CHANGE UP (ref 70–99)
HCT VFR BLD CALC: 43.5 % — SIGNIFICANT CHANGE UP (ref 39–50)
HGB BLD-MCNC: 13.5 G/DL — SIGNIFICANT CHANGE UP (ref 13–17)
MCHC RBC-ENTMCNC: 23.5 PG — LOW (ref 27–34)
MCHC RBC-ENTMCNC: 31 GM/DL — LOW (ref 32–36)
MCV RBC AUTO: 75.7 FL — LOW (ref 80–100)
PLATELET # BLD AUTO: 265 K/UL — SIGNIFICANT CHANGE UP (ref 150–400)
POTASSIUM SERPL-MCNC: 3.5 MMOL/L — SIGNIFICANT CHANGE UP (ref 3.5–5.3)
POTASSIUM SERPL-SCNC: 3.5 MMOL/L — SIGNIFICANT CHANGE UP (ref 3.5–5.3)
PROT SERPL-MCNC: 6.4 G/DL — LOW (ref 6.6–8.7)
RBC # BLD: 5.75 M/UL — SIGNIFICANT CHANGE UP (ref 4.2–5.8)
RBC # FLD: 17.5 % — HIGH (ref 10.3–14.5)
SODIUM SERPL-SCNC: 145 MMOL/L — SIGNIFICANT CHANGE UP (ref 135–145)
TSH SERPL-MCNC: 1.87 UIU/ML — SIGNIFICANT CHANGE UP (ref 0.27–4.2)
WBC # BLD: 7.05 K/UL — SIGNIFICANT CHANGE UP (ref 3.8–10.5)
WBC # FLD AUTO: 7.05 K/UL — SIGNIFICANT CHANGE UP (ref 3.8–10.5)

## 2022-06-16 PROCEDURE — 99232 SBSQ HOSP IP/OBS MODERATE 35: CPT

## 2022-06-16 RX ORDER — DAPAGLIFLOZIN 10 MG/1
10 TABLET, FILM COATED ORAL EVERY 24 HOURS
Refills: 0 | Status: DISCONTINUED | OUTPATIENT
Start: 2022-06-16 | End: 2022-06-21

## 2022-06-16 RX ADMIN — SACUBITRIL AND VALSARTAN 1 TABLET(S): 24; 26 TABLET, FILM COATED ORAL at 09:14

## 2022-06-16 RX ADMIN — PANTOPRAZOLE SODIUM 40 MILLIGRAM(S): 20 TABLET, DELAYED RELEASE ORAL at 05:20

## 2022-06-16 RX ADMIN — ATORVASTATIN CALCIUM 40 MILLIGRAM(S): 80 TABLET, FILM COATED ORAL at 21:22

## 2022-06-16 RX ADMIN — Medication 80 MILLIGRAM(S): at 17:47

## 2022-06-16 RX ADMIN — SACUBITRIL AND VALSARTAN 1 TABLET(S): 24; 26 TABLET, FILM COATED ORAL at 21:22

## 2022-06-16 RX ADMIN — DAPAGLIFLOZIN 10 MILLIGRAM(S): 10 TABLET, FILM COATED ORAL at 12:17

## 2022-06-16 RX ADMIN — Medication 81 MILLIGRAM(S): at 11:14

## 2022-06-16 RX ADMIN — Medication 50 MILLIGRAM(S): at 05:20

## 2022-06-16 RX ADMIN — Medication 80 MILLIGRAM(S): at 05:20

## 2022-06-17 DIAGNOSIS — I50.21 ACUTE SYSTOLIC (CONGESTIVE) HEART FAILURE: ICD-10-CM

## 2022-06-17 LAB
ANION GAP SERPL CALC-SCNC: 11 MMOL/L — SIGNIFICANT CHANGE UP (ref 5–17)
BUN SERPL-MCNC: 25.7 MG/DL — HIGH (ref 8–20)
CALCIUM SERPL-MCNC: 8.8 MG/DL — SIGNIFICANT CHANGE UP (ref 8.6–10.2)
CHLORIDE SERPL-SCNC: 100 MMOL/L — SIGNIFICANT CHANGE UP (ref 98–107)
CO2 SERPL-SCNC: 36 MMOL/L — HIGH (ref 22–29)
CREAT SERPL-MCNC: 1.56 MG/DL — HIGH (ref 0.5–1.3)
EGFR: 53 ML/MIN/1.73M2 — LOW
GLUCOSE SERPL-MCNC: 83 MG/DL — SIGNIFICANT CHANGE UP (ref 70–99)
HCT VFR BLD CALC: 45.1 % — SIGNIFICANT CHANGE UP (ref 39–50)
HGB BLD-MCNC: 14.2 G/DL — SIGNIFICANT CHANGE UP (ref 13–17)
MCHC RBC-ENTMCNC: 23.4 PG — LOW (ref 27–34)
MCHC RBC-ENTMCNC: 31.5 GM/DL — LOW (ref 32–36)
MCV RBC AUTO: 74.3 FL — LOW (ref 80–100)
PLATELET # BLD AUTO: 260 K/UL — SIGNIFICANT CHANGE UP (ref 150–400)
POTASSIUM SERPL-MCNC: 3.4 MMOL/L — LOW (ref 3.5–5.3)
POTASSIUM SERPL-SCNC: 3.4 MMOL/L — LOW (ref 3.5–5.3)
RBC # BLD: 6.07 M/UL — HIGH (ref 4.2–5.8)
RBC # FLD: 17.7 % — HIGH (ref 10.3–14.5)
SODIUM SERPL-SCNC: 147 MMOL/L — HIGH (ref 135–145)
WBC # BLD: 6.35 K/UL — SIGNIFICANT CHANGE UP (ref 3.8–10.5)
WBC # FLD AUTO: 6.35 K/UL — SIGNIFICANT CHANGE UP (ref 3.8–10.5)

## 2022-06-17 PROCEDURE — 99232 SBSQ HOSP IP/OBS MODERATE 35: CPT

## 2022-06-17 RX ORDER — POTASSIUM CHLORIDE 20 MEQ
40 PACKET (EA) ORAL EVERY 4 HOURS
Refills: 0 | Status: COMPLETED | OUTPATIENT
Start: 2022-06-17 | End: 2022-06-17

## 2022-06-17 RX ORDER — FUROSEMIDE 40 MG
60 TABLET ORAL
Refills: 0 | Status: DISCONTINUED | OUTPATIENT
Start: 2022-06-17 | End: 2022-06-20

## 2022-06-17 RX ADMIN — SACUBITRIL AND VALSARTAN 1 TABLET(S): 24; 26 TABLET, FILM COATED ORAL at 11:11

## 2022-06-17 RX ADMIN — Medication 80 MILLIGRAM(S): at 05:43

## 2022-06-17 RX ADMIN — PANTOPRAZOLE SODIUM 40 MILLIGRAM(S): 20 TABLET, DELAYED RELEASE ORAL at 05:43

## 2022-06-17 RX ADMIN — Medication 80 MILLIGRAM(S): at 12:45

## 2022-06-17 RX ADMIN — Medication 50 MILLIGRAM(S): at 05:43

## 2022-06-17 RX ADMIN — Medication 40 MILLIEQUIVALENT(S): at 13:08

## 2022-06-17 RX ADMIN — Medication 81 MILLIGRAM(S): at 12:46

## 2022-06-17 RX ADMIN — Medication 40 MILLIEQUIVALENT(S): at 11:11

## 2022-06-17 RX ADMIN — ATORVASTATIN CALCIUM 40 MILLIGRAM(S): 80 TABLET, FILM COATED ORAL at 21:38

## 2022-06-17 RX ADMIN — DAPAGLIFLOZIN 10 MILLIGRAM(S): 10 TABLET, FILM COATED ORAL at 13:09

## 2022-06-17 RX ADMIN — SACUBITRIL AND VALSARTAN 1 TABLET(S): 24; 26 TABLET, FILM COATED ORAL at 21:38

## 2022-06-17 NOTE — CONSULT NOTE ADULT - NS ATTEND AMEND GEN_ALL_CORE FT
51 y/o male with h/o HTN with recently diagnosed biventricular systolic HF ACC/AHA stage C-D in setting of NICMP LVEF 12% LVIDd 6.85cm. Pt reports HF symptoms ~2 mo ago. He initially presented to urgent care with SOB. He was prescribed steroids, ABx and an inhaler. His symptoms worsened and he presented to [facility] where he was diagnosed with new CMP. He underwent LHC which ruled out obstructive CAD. He wasn't satisfied with the care provided and he left AMA. He returned to [facility] with worsening HF symptoms and was discharged 2-3 weeks ago. He is now readmitted with HF exacerbation. It is unclear if he was taking all the medications prescribed.   He states that prior to 2 mo ago he was in his usual state of health. He was active and had no issues with exertion. He's had HTN for a few years and at some point was on medications. These were discontinued after he lost 100 lb (intentionally).   He denies any recent cold-like symptoms or covid infection.   No FH of cardiac disease or SCD. He lives with his family. He has 10 kids and 9 grandkids. He denies etoh abuse or illict drug use. He works in a car service/entertainment.   Clinically looks hypervolemic/anasarca with lukewarm extremities. Labs remarkable for proBNP 9K, elevated LFTS, INR 1.8 and elevated creatinine (unknown baseline). His TTE is remarkable for severe bi-v dysfunction, LVEF <20% LVIDd 6.85cm, LVOT VTI 8.6cm, mod-sev MR and mod TR.   Recommendations:  - Continue aggressive diuresis, do not recommend to decrease lasix dose as pt remains in anasarca  - Close monitoring of electrolytes  - GDMT/afterload reduction as above  - Continue HF education  - Agree with cMRI to rule out infiltrative disease  - Will reassess valvulopathies once euvolemic and on full GDMT  - Can consider cardioMEMs prior to DC  - We will continue to follow with you.  - Will need close outpt follow up as he may need advanced therapies  Plan d/w Dr. Etienne.

## 2022-06-17 NOTE — CONSULT NOTE ADULT - PROBLEM SELECTOR RECOMMENDATION 2
sCr 1.5 in setting of congestion, unknown baseline  monitor closely with diuresis sCr 1.5 unknown baseline  ?CKD  monitor closely with diuresis

## 2022-06-17 NOTE — DIETITIAN INITIAL EVALUATION ADULT - ORAL INTAKE PTA/DIET HISTORY
Pt states he has good po intake with intentional 100# weight loss over last year. Now pt with fluid weight gain. No n,v, d,c. Reviewed diet education with pt and encouraged pt to be compliant. Pt claims to follow fluid restriction well.

## 2022-06-17 NOTE — CONSULT NOTE ADULT - ASSESSMENT
53 y/o male with newly diagnosed systolic heart failure, NICM (LVEF < 20%, LVIDd 6.85), HTN, HLD and COLLEEN who presented to [facility] 6/15 with c/o weight gain, significant LE edema and testicular swelling. 53 y/o male with newly diagnosed systolic heart failure, NICM (LVEF < 20%, LVIDd 6.85), HTN, HLD and COLLEEN who presented to [facility] 6/15 with c/o weight gain, significant LE edema and testicular swelling.     He refers he was feeling well and without limitations up until about 1.5 to 2 months ago when he began experiencing acid reflux symptoms/cough. He initially presented to urgent care and was prescribed oral steroids, AB and an inhaler. Within 24hrs of taking steroids he had significant worsening of symptoms including edema which prompted him to [facility]. At that time, he was found to have low EF on echo. LHC revealed NO obstructive CAD. He tells me he signed out AMA due to feeling very confused and overwhelmed. He ultimately returned back with recurrent HF symptoms. He was discharged home approx 2-3 weeks ago.     His recent recurrent hospital admissions are concerning. Will plan for aggressive IV diuresis and GDMT optimization. CMR ordered.    Cardiac Testing:  TTE 6/16/22: LVEF <20%, LVIDd 6.85cm, moderate RVE with moderately reduced RV systolic function, severe biatrial enlargement, moderate to sever MR, moderate TR, PASP 38.2mmHg (RAP 8). 51 y/o male with newly diagnosed systolic heart failure, NICM (LVEF < 20%, LVIDd 6.85), HTN, HLD and COLLEEN who presented to [facility] 6/15 with c/o weight gain, significant LE edema and testicular swelling.     He refers he was feeling well and without limitations up until about 1.5 to 2 months ago when he began experiencing acid reflux symptoms/cough. He initially presented to urgent care and was prescribed oral steroids, AB and an inhaler. Within 24hrs of taking steroids he had significant worsening of symptoms including edema which prompted him to [facility]. At that time, he was found to have low EF on echo. LHC revealed NO obstructive CAD. He tells me he signed out AMA due to feeling very confused and overwhelmed. He ultimately returned back with recurrent HF symptoms. He was discharged home approx 2-3 weeks ago.     His recent recurrent hospital admissions are concerning. Will plan for aggressive IV diuresis and GDMT optimization. CMR ordered.    Cardiologist: Janes Etienne MD ([facility])    Cardiac Testing:  TTE 6/16/22: LVEF <20%, LVIDd 6.85cm, moderate RVE with moderately reduced RV systolic function, severe biatrial enlargement, moderate to sever MR, moderate TR, PASP 38.2mmHg (RAP 8).

## 2022-06-17 NOTE — CONSULT NOTE ADULT - ASSESSMENT
51 y/o male with newly diagnosed non-ischemic dilated cardiomyopathy (LVEF <20%, LVIDd 6.85cm), HTN and HLD who presented to [facility] 6/15 for worsening dyspnea, orthopnea and testicular swelling.

## 2022-06-17 NOTE — DIETITIAN INITIAL EVALUATION ADULT - OTHER INFO
52 yr old M w/a PMH of HTN, HLD, CHF with EF 15% admitted with acute on chronic exacerbation of CHF.

## 2022-06-17 NOTE — DIETITIAN INITIAL EVALUATION ADULT - PERTINENT LABORATORY DATA
06-17    147<H>  |  100  |  25.7<H>  ----------------------------<  83  3.4<L>   |  36.0<H>  |  1.56<H>    Ca    8.8      17 Jun 2022 05:45    TPro  6.4<L>  /  Alb  3.1<L>  /  TBili  0.8  /  DBili  x   /  AST  34  /  ALT  51<H>  /  AlkPhos  88  06-16  A1C with Estimated Average Glucose Result: 5.5 % (06-16-22 @ 05:43)

## 2022-06-17 NOTE — CONSULT NOTE ADULT - SUBJECTIVE AND OBJECTIVE BOX
[facility] Advanced Heart Failure  Office: [place] [address]  Telephone: [phone]/Fax: [phone]    HPI:  52 yr old M w/a PMH of HTN, HLD and recently diagnosed congestive heart failure.  Patient reports a month and half ago he went to the urgent care for shortness of breath.  He was given a course of antibiotics and steroids.  After the course he began noticing significant swelling in his lower extremities along with associated shortness of breath.  He went to [facility] for evaluation and was found to have a low ejection fraction, self reported of 15%.  He reports a cardiac cath was done that revealed no ischemic disease.  Since discharge from [facility] he states he has been following up with his cardiologist and awaiting a lifevest.  He reports frustration with the care as he felt as if nothing was getting better.  He came to the ED for evaluation because not he has noticed testicular swelling as well.  He states his ability to walk without shortness of breath has worsened.  He reports orthopnea and PND.   (15 Braulio 2022 09:09)  Above appreciated. Patient     PAST MEDICAL & SURGICAL HISTORY:  CHF, chronic    Hypertension    Hyperlipidemia    S/P hernia repair    REVIEW OF SYSTEMS:  14 point ROS negative in detail apart from as documented in HPI.    MEDICATIONS  (STANDING):  aspirin enteric coated 81 milliGRAM(s) Oral daily  atorvastatin 40 milliGRAM(s) Oral at bedtime  dapagliflozin 10 milliGRAM(s) Oral every 24 hours  furosemide   Injectable 60 milliGRAM(s) IV Push two times a day  metoprolol succinate ER 50 milliGRAM(s) Oral daily  pantoprazole    Tablet 40 milliGRAM(s) Oral before breakfast  sacubitril 24 mG/valsartan 26 mG 1 Tablet(s) Oral every 12 hours    MEDICATIONS  (PRN):  famotidine Injectable 20 milliGRAM(s) IV Push two times a day PRN dyspepsia    Home Medications:  aspirin 81 mg oral delayed release tablet: 1 tab(s) orally once a day (15 Braulio 2022 09:14)  Entresto 24 mg-26 mg oral tablet: 1 tab(s) orally 2 times a day (15 Braulio 2022 09:14)  Lasix 40 mg oral tablet: 1 tab(s) orally once a day (15 Braulio 2022 09:14)  Lipitor 40 mg oral tablet: 1 tab(s) orally once a day (15 Braulio 2022 09:14)  metoprolol succinate 50 mg oral tablet, extended release: 1 tab(s) orally once a day (15 Braulio 2022 09:14)  Pepcid 20 mg oral tablet: 1 tab(s) orally 2 times a day (15 Braulio 2022 09:14)    Allergies: No Known Allergies    Social History:  Non smoker  No ETOH  No illicit drug use    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   HEENT: EOMs intact.  Neck: Neck supple. JVP elevated to jaw  Chest: Clear to auscultation bilaterally  CV: RRR. Normal S1 and S2. No murmurs, rub, or gallops. Warm peripherally.  PV: 3+ B/L LE edema up thighs. Radial pulses normal.  Abdomen: distended  Skin: warm, dry, no rash  Neurology: Alert and oriented times three. Sensation intact  Psych: Affect normal    Labs:    ( 22 @ 05:45 )               14.2   6.35  )--------( 260                  45.1     ( 22 @ 05:45 )     147  |  100  |  25.7  ---------------------<  83  3.4  |  36.0  |  1.56    Ca 8.8  Phos x   Mg x     ( 22 @ 05:43 )  TPro  6.4  /  Alb  3.1  /  TBili  0.8  /  DBili  x   /  AST  34  /  ALT  51  /  AlkPhos  88  ( 06-15-22 @ 03:13 )  TPro  6.5  /  Alb  3.5  /  TBili  1.0  /  DBili  x   /  AST  42  /  ALT  60  /  AlkPhos  96    Serum Pro-Brain Natriuretic Peptide: 9830 (06-15-22 @ 03:13)    Xray Chest 2 Views PA/Lat (06.15.22 @ 03:50)   INTERPRETATION:  PA and lateral chest on Belén 15, 2022 at 3:44 AM.   Patient has chest and testicular pain.    COMPARISON: None available.    Heart is enlarged.    There is an amorphous density off the left upper hilum possibly   infiltrate related. Follow-up to radiographic clearing recommended.    IMPRESSION: Heart enlargement. Small density off left upper hilum likely   infiltrate. Follow-upto radiographic clearing recommended. A note was   posted on the [facility] ED discrepancy site at 12:07 PM.    --- End of Report ---     TTE Echo Complete w/ Contrast w/ Doppler (22 @ 14:15)    Summary:   1. Severely increased left ventricular internal cavity size.   2. Severely decreased global left ventricular systolic function.   3. Left ventricular ejection fraction, by visual estimation, is <20%.   4. Dilated cardiomyopathy.   5. Moderately enlarged right ventricle.   6. Mildly increased RV wall thickness.   7. Moderately reduced RV systolic function.   8. Moderate to severe right atrial enlargement.   9. Severely enlarged left atrium.  10. Mild thickening of the anterior and posterior mitral valve leaflets.  11. Moderate to severe mitral valve regurgitation.  12. Moderate tricuspid regurgitation.  13. Normal trileaflet aortic valve with normal opening.  14. Estimated pulmonary artery systolic pressure is 38.2 mmHg assuming a   right atrial pressure of 8 mmHg, which is consistent with borderline   pulmonary hypertension.  15. There is no evidence of pericardial effusion.  16. Endocardial visualization was enhanced with intravenous echo contrast.    < end of copied text >         [facility] Advanced Heart Failure  Office: [place] [address]  Telephone: [phone]/Fax: [phone]    HPI:  51 y/o male with newly diagnosed systolic heart failure, NICM (LVEF < 20%, LVIDd 6.85), HTN, HLD and COLLEEN who presented to [facility] 6/15 with c/o weight gain, significant LE edema and testicular swelling.     Patient refers he was feeling well and without limitations up until about 1.5 to 2 months ago when he began experiencing acid reflux symptoms/cough. He reports that he initially presented to urgent care about 1.5 months ago and was prescribed oral steroids, AB and an inhaler. Within 24hrs of taking steroids he had significant worsening of symptoms including edema which prompted him to [facility]. At that time, he was found to have low EF on echo. Per notes/pt report LHC revealed NO obstructive CAD. He tells me he signed out AMA due to feeling very confused and overwhelmed. He ultimately returned back with recurrent HF symptoms. He was discharged home approx 2-3 weeks ago on some GDMT but he has developed worsening edema/testicular swelling.     Since admission, he reports his symptoms have improved significantly. He endorses very good UOP. Denies CP, palpitations, SOB, dizziness/LH.     PAST MEDICAL & SURGICAL HISTORY:  CHF    Hypertension    Hyperlipidemia    S/P hernia repair    REVIEW OF SYSTEMS:  14 point ROS negative in detail apart from as documented in HPI.    MEDICATIONS  (STANDING):  aspirin enteric coated 81 milliGRAM(s) Oral daily  atorvastatin 40 milliGRAM(s) Oral at bedtime  dapagliflozin 10 milliGRAM(s) Oral every 24 hours  furosemide   Injectable 60 milliGRAM(s) IV Push two times a day  metoprolol succinate ER 50 milliGRAM(s) Oral daily  pantoprazole    Tablet 40 milliGRAM(s) Oral before breakfast  sacubitril 24 mG/valsartan 26 mG 1 Tablet(s) Oral every 12 hours    MEDICATIONS  (PRN):  famotidine Injectable 20 milliGRAM(s) IV Push two times a day PRN dyspepsia    Home Medications:  aspirin 81 mg oral delayed release tablet: 1 tab(s) orally once a day (15 Braulio 2022 09:14)  Entresto 24 mg-26 mg oral tablet: 1 tab(s) orally 2 times a day (15 Braulio 2022 09:14)  Lasix 40 mg oral tablet: 1 tab(s) orally once a day (15 Braulio 2022 09:14)  Lipitor 40 mg oral tablet: 1 tab(s) orally once a day (15 Braulio 2022 09:14)  metoprolol succinate 50 mg oral tablet, extended release: 1 tab(s) orally once a day (15 Braulio 2022 09:14)  Pepcid 20 mg oral tablet: 1 tab(s) orally 2 times a day (15 Braulio 2022 09:14)    Allergies: No Known Allergies    Social History:  Lives with family, 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   HEENT: EOMs intact.  Neck: Neck supple. JVP elevated to jaw  Chest: Clear to auscultation bilaterally  CV: RRR. Normal S1 and S2. No murmurs, rub, or gallops. Warm peripherally.  PV: 3+ B/L LE edema up thighs. Radial pulses normal.  Abdomen: distended  Skin: warm, dry, no rash  Neurology: Alert and oriented times three. Sensation intact  Psych: Affect normal    Labs:    ( 22 @ 05:45 )               14.2   6.35  )--------( 260                  45.1     ( 22 @ 05:45 )     147  |  100  |  25.7  ---------------------<  83  3.4  |  36.0  |  1.56    Ca 8.8  Phos x   Mg x     ( 22 @ 05:43 )  TPro  6.4  /  Alb  3.1  /  TBili  0.8  /  DBili  x   /  AST  34  /  ALT  51  /  AlkPhos  88  ( 06-15-22 @ 03:13 )  TPro  6.5  /  Alb  3.5  /  TBili  1.0  /  DBili  x   /  AST  42  /  ALT  60  /  AlkPhos  96    Serum Pro-Brain Natriuretic Peptide: 9830 (06-15-22 @ 03:13)    Xray Chest 2 Views PA/Lat (06.15.22 @ 03:50)   INTERPRETATION:  PA and lateral chest on Belén 15, 2022 at 3:44 AM.   Patient has chest and testicular pain.    COMPARISON: None available.    Heart is enlarged.    There is an amorphous density off the left upper hilum possibly   infiltrate related. Follow-up to radiographic clearing recommended.    IMPRESSION: Heart enlargement. Small density off left upper hilum likely   infiltrate. Follow-upto radiographic clearing recommended. A note was   posted on the [facility] ED discrepancy site at 12:07 PM.    --- End of Report ---     TTE Echo Complete w/ Contrast w/ Doppler (22 @ 14:15)    Summary:   1. Severely increased left ventricular internal cavity size.   2. Severely decreased global left ventricular systolic function.   3. Left ventricular ejection fraction, by visual estimation, is <20%.   4. Dilated cardiomyopathy.   5. Moderately enlarged right ventricle.   6. Mildly increased RV wall thickness.   7. Moderately reduced RV systolic function.   8. Moderate to severe right atrial enlargement.   9. Severely enlarged left atrium.  10. Mild thickening of the anterior and posterior mitral valve leaflets.  11. Moderate to severe mitral valve regurgitation.  12. Moderate tricuspid regurgitation.  13. Normal trileaflet aortic valve with normal opening.  14. Estimated pulmonary artery systolic pressure is 38.2 mmHg assuming a   right atrial pressure of 8 mmHg, which is consistent with borderline   pulmonary hypertension.  15. There is no evidence of pericardial effusion.  16. Endocardial visualization was enhanced with intravenous echo contrast.    < end of copied text >         [facility] Advanced Heart Failure  Office: [place] [address]  Telephone: [phone]/Fax: [phone]    HPI:  51 y/o male with newly diagnosed systolic heart failure, NICM (LVEF < 20%, LVIDd 6.85), HTN, HLD and COLLEEN who presented to [facility] 6/15 with c/o weight gain, significant LE edema and testicular swelling.     Patient refers he was feeling well and without limitations up until about 1.5 to 2 months ago when he began experiencing acid reflux symptoms/cough. He reports that he initially presented to urgent care about 1.5 months ago and was prescribed oral steroids, AB and an inhaler. Within 24hrs of taking steroids he had significant worsening of symptoms including edema which prompted him to [facility]. At that time, he was found to have low EF on echo. Per notes/pt report LHC revealed NO obstructive CAD. He tells me he signed out AMA due to feeling very confused and overwhelmed. He ultimately returned back with recurrent HF symptoms. He was discharged home approx 2-3 weeks ago on some GDMT but he has developed worsening edema/testicular swelling.     Since admission, he reports his symptoms have improved significantly. He endorses very good UOP. Denies CP, palpitations, SOB, dizziness/LH.     PAST MEDICAL & SURGICAL HISTORY:  CHF    Hypertension    Hyperlipidemia    S/P hernia repair    REVIEW OF SYSTEMS:  14 point ROS negative in detail apart from as documented in HPI.    MEDICATIONS  (STANDING):  aspirin enteric coated 81 milliGRAM(s) Oral daily  atorvastatin 40 milliGRAM(s) Oral at bedtime  dapagliflozin 10 milliGRAM(s) Oral every 24 hours  furosemide   Injectable 60 milliGRAM(s) IV Push two times a day  metoprolol succinate ER 50 milliGRAM(s) Oral daily  pantoprazole    Tablet 40 milliGRAM(s) Oral before breakfast  sacubitril 24 mG/valsartan 26 mG 1 Tablet(s) Oral every 12 hours    MEDICATIONS  (PRN):  famotidine Injectable 20 milliGRAM(s) IV Push two times a day PRN dyspepsia    Home Medications:  aspirin 81 mg oral delayed release tablet: 1 tab(s) orally once a day (15 Braulio 2022 09:14)  Entresto 24 mg-26 mg oral tablet: 1 tab(s) orally 2 times a day (15 Braulio 2022 09:14)  Lasix 40 mg oral tablet: 1 tab(s) orally once a day (15 Braulio 2022 09:14)  Lipitor 40 mg oral tablet: 1 tab(s) orally once a day (15 Braulio 2022 09:14)  metoprolol succinate 50 mg oral tablet, extended release: 1 tab(s) orally once a day (15 Braulio 2022 09:14)  Pepcid 20 mg oral tablet: 1 tab(s) orally 2 times a day (15 Braulio 2022 09:14)    Allergies: No Known Allergies    Social History:  Lives with family, 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   HEENT: EOMs intact.  Neck: Neck supple. JVP elevated to jaw  Chest: Clear to auscultation bilaterally  CV: RRR. Normal S1 and S2. II/VI systolic murmur at apex. Warm peripherally.  PV: 3+ B/L LE edema up thighs. Radial pulses normal.  Abdomen: distended  Skin: warm, dry, no rash  Neurology: Alert and oriented times three. Sensation intact  Psych: Affect normal    Labs:    ( 22 @ 05:45 )               14.2   6.35  )--------( 260                  45.1     ( 22 @ 05:45 )     147  |  100  |  25.7  ---------------------<  83  3.4  |  36.0  |  1.56    Ca 8.8  Phos x   Mg x     ( 22 @ 05:43 )  TPro  6.4  /  Alb  3.1  /  TBili  0.8  /  DBili  x   /  AST  34  /  ALT  51  /  AlkPhos  88  ( 06-15-22 @ 03:13 )  TPro  6.5  /  Alb  3.5  /  TBili  1.0  /  DBili  x   /  AST  42  /  ALT  60  /  AlkPhos  96    Serum Pro-Brain Natriuretic Peptide: 9830 (06-15-22 @ 03:13)    Xray Chest 2 Views PA/Lat (06.15.22 @ 03:50)   INTERPRETATION:  PA and lateral chest on Belén 15, 2022 at 3:44 AM.   Patient has chest and testicular pain.    COMPARISON: None available.    Heart is enlarged.    There is an amorphous density off the left upper hilum possibly   infiltrate related. Follow-up to radiographic clearing recommended.    IMPRESSION: Heart enlargement. Small density off left upper hilum likely   infiltrate. Follow-upto radiographic clearing recommended. A note was   posted on the [facility] ED discrepancy site at 12:07 PM.    --- End of Report ---     TTE Echo Complete w/ Contrast w/ Doppler (22 @ 14:15)    Summary:   1. Severely increased left ventricular internal cavity size.   2. Severely decreased global left ventricular systolic function.   3. Left ventricular ejection fraction, by visual estimation, is <20%.   4. Dilated cardiomyopathy.   5. Moderately enlarged right ventricle.   6. Mildly increased RV wall thickness.   7. Moderately reduced RV systolic function.   8. Moderate to severe right atrial enlargement.   9. Severely enlarged left atrium.  10. Mild thickening of the anterior and posterior mitral valve leaflets.  11. Moderate to severe mitral valve regurgitation.  12. Moderate tricuspid regurgitation.  13. Normal trileaflet aortic valve with normal opening.  14. Estimated pulmonary artery systolic pressure is 38.2 mmHg assuming a   right atrial pressure of 8 mmHg, which is consistent with borderline   pulmonary hypertension.  15. There is no evidence of pericardial effusion.  16. Endocardial visualization was enhanced with intravenous echo contrast.    < end of copied text >

## 2022-06-17 NOTE — CONSULT NOTE ADULT - SUBJECTIVE AND OBJECTIVE BOX
NOTE INCOMPLETE    [facility] Advanced Heart Failure  Office: [address] [place] [id number]  Telephone: [phone]/Fax: [phone]    HPI:  52 yr old M w/a PMH of HTN, HLD and recently diagnosed congestive heart failure.  Patient reports a month and half ago he went to the urgent care for shortness of breath.  He was given a course of antibiotics and steroids.  After the course he began noticing significant swelling in his lower extremities along with associated shortness of breath.  He went to [facility] for evaluation and was found to have a low ejection fraction, self reported of 15%.  He reports a cardiac cath was done that revealed no ischemic disease.  Since discharge from [facility] he states he has been following up with his cardiologist and awaiting a lifevest.  He reports frustration with the care as he felt as if nothing was getting better.  He came to the ED for evaluation because not he has noticed testicular swelling as well.  He states his ability to walk without shortness of breath has worsened.  He reports orthopnea and PND.   (15 Braulio 2022 09:09)    PAST MEDICAL & SURGICAL HISTORY:  CHF, chronic    Hypertension    Hyperlipidemia    S/P hernia repair    REVIEW OF SYSTEMS:  14 point ROS negative in detail apart from as documented in HPI.    MEDICATIONS  (STANDING):  aspirin enteric coated 81 milliGRAM(s) Oral daily  atorvastatin 40 milliGRAM(s) Oral at bedtime  dapagliflozin 10 milliGRAM(s) Oral every 24 hours  furosemide   Injectable 80 milliGRAM(s) IV Push two times a day  metoprolol succinate ER 50 milliGRAM(s) Oral daily  pantoprazole    Tablet 40 milliGRAM(s) Oral before breakfast  sacubitril 24 mG/valsartan 26 mG 1 Tablet(s) Oral every 12 hours    MEDICATIONS  (PRN):  famotidine Injectable 20 milliGRAM(s) IV Push two times a day PRN dyspepsia    Home Medications:  aspirin 81 mg oral delayed release tablet: 1 tab(s) orally once a day (15 Braulio 2022 09:14)  Entresto 24 mg-26 mg oral tablet: 1 tab(s) orally 2 times a day (15 Braulio 2022 09:14)  Lasix 40 mg oral tablet: 1 tab(s) orally once a day (15 Braulio 2022 09:14)  Lipitor 40 mg oral tablet: 1 tab(s) orally once a day (15 Braulio 2022 09:14)  metoprolol succinate 50 mg oral tablet, extended release: 1 tab(s) orally once a day (15 Braulio 2022 09:14)  Pepcid 20 mg oral tablet: 1 tab(s) orally 2 times a day (15 Braulio 2022 09:14)    Allergies: No Known Allergies    Intolerances      Social History:  Non smoker  No ETOH  No illicit drug use    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      Tele:    Physical Exam:  General: No distress. Comfortable.  HEENT: EOM intact.  Neck: Neck supple. JVP not elevated. No masses  Chest: Clear to auscultation bilaterally  CV: Normal S1 and S2. No murmurs, rub, or gallops. Radial pulses normal.  Abdomen: Soft, non-distended, non-tender  Skin: No rashes or skin breakdown  Neurology: Alert and oriented times three. Sensation intact  Psych: Affect normal    Labs:    ( 22 @ 05:45 )               14.2   6.35  )--------( 260                  45.1     ( 22 @ 05:45 )     147  |  100  |  25.7  ---------------------<  83  3.4  |  36.0  |  1.56    Ca 8.8  Phos x   Mg x     ( 22 @ 05:43 )  TPro  6.4  /  Alb  3.1  /  TBili  0.8  /  DBili  x   /  AST  34  /  ALT  51  /  AlkPhos  88  ( 06-15-22 @ 03:13 )  TPro  6.5  /  Alb  3.5  /  TBili  1.0  /  DBili  x   /  AST  42  /  ALT  60  /  AlkPhos  96    Serum Pro-Brain Natriuretic Peptide: 9830 (06-15-22 @ 03:13)  TTE Echo Complete w/ Contrast w/ Doppler (22 @ 14:15)     Summary:   1. Severely increased left ventricular internal cavity size.   2. Severely decreased global left ventricular systolic function.   3. Left ventricular ejection fraction, by visual estimation, is <20%.   4. Dilated cardiomyopathy.   5. Moderately enlarged right ventricle.   6. Mildly increased RV wall thickness.   7. Moderately reduced RV systolic function.   8. Moderate to severe right atrial enlargement.   9. Severely enlarged left atrium.  10. Mild thickening of the anterior and posterior mitral valve leaflets.  11. Moderate to severe mitral valve regurgitation.  12. Moderate tricuspid regurgitation.  13. Normal trileaflet aortic valve with normal opening.  14. Estimated pulmonary artery systolic pressure is 38.2 mmHg assuming a   right atrial pressure of 8 mmHg, which is consistent with borderline   pulmonary hypertension.  15. There is no evidence of pericardial effusion.  16. Endocardial visualization was enhanced with intravenous echo contrast.     Xray Chest 2 Views PA/Lat (06.15.22 @ 03:50)     INTERPRETATION:  PA and lateral chest on Belén 15, 2022 at 3:44 AM.   Patient has chest and testicular pain.    COMPARISON: None available.    Heart is enlarged.    There is an amorphous density off the left upper hilum possibly   infiltrate related. Follow-up to radiographic clearing recommended.    IMPRESSION: Heart enlargement. Small density off left upper hilum likely   infiltrate. Follow-upto radiographic clearing recommended. A note was   posted on the [facility] ED discrepancy site at 12:07 PM.    < end of copied text >

## 2022-06-17 NOTE — CONSULT NOTE ADULT - PROBLEM SELECTOR RECOMMENDATION 9
Recently diagnosed dilated non-ischemic cardiomyopathy (LVEF <20%, LIVDd 6.8cm) w/ RV dysfunction.   BNP 9830  Clinically he appears significantly hypervolemic, warm extremities.  Needs continued aggressive diuresis. Recommend continuing Lasix 80mg IVP BID.  Please check q12hrs BMP. Maintain K >4, Mg >2.  GDMT: Increase Entresto to 49-51mg BID. Start Aldactone 25mg daily. Continue Toprol-XL 50mg daily and Farxiga 10mg daily.  Plan for cardiac MRI to help delineate etiology. Possibly HTN vs ? infiltrative process.  Device: Will plan to reassess EF after 3mo of maximally tolerated GDMT  Will refer to cardiac rehab as outpt Recently diagnosed dilated non-ischemic cardiomyopathy (LVEF <20%, LIVDd 6.8cm) w/ RV dysfunction.   BNP 9830  Clinically he appears significantly hypervolemic, warm extremities.  Needs continued aggressive diuresis. Recommend continuing Lasix 80mg IVP BID.  Please check q12hrs BMP. Maintain K >4, Mg >2.  GDMT: Increase Entresto to 49-51mg BID. Start Aldactone 25mg daily. Continue Toprol-XL 50mg daily and Farxiga 10mg daily.  Mod-sev MR/TR - will reassess once euvolemic and on full GDMT  Plan for cardiac MRI to rule out infiltrative disease.   Device: Will plan to reassess EF after 3mo of maximally tolerated GDMT. Gen cards recommending LifeVest.   Please check iron panel  Will refer to cardiac rehab as outpt

## 2022-06-18 LAB
ANION GAP SERPL CALC-SCNC: 13 MMOL/L — SIGNIFICANT CHANGE UP (ref 5–17)
BUN SERPL-MCNC: 27.8 MG/DL — HIGH (ref 8–20)
CALCIUM SERPL-MCNC: 8.6 MG/DL — SIGNIFICANT CHANGE UP (ref 8.6–10.2)
CHLORIDE SERPL-SCNC: 96 MMOL/L — LOW (ref 98–107)
CO2 SERPL-SCNC: 35 MMOL/L — HIGH (ref 22–29)
CREAT SERPL-MCNC: 1.53 MG/DL — HIGH (ref 0.5–1.3)
EGFR: 55 ML/MIN/1.73M2 — LOW
GLUCOSE SERPL-MCNC: 77 MG/DL — SIGNIFICANT CHANGE UP (ref 70–99)
MAGNESIUM SERPL-MCNC: 1.9 MG/DL — SIGNIFICANT CHANGE UP (ref 1.6–2.6)
PHOSPHATE SERPL-MCNC: 3.4 MG/DL — SIGNIFICANT CHANGE UP (ref 2.4–4.7)
POTASSIUM SERPL-MCNC: 3.9 MMOL/L — SIGNIFICANT CHANGE UP (ref 3.5–5.3)
POTASSIUM SERPL-SCNC: 3.9 MMOL/L — SIGNIFICANT CHANGE UP (ref 3.5–5.3)
SODIUM SERPL-SCNC: 144 MMOL/L — SIGNIFICANT CHANGE UP (ref 135–145)

## 2022-06-18 PROCEDURE — 99233 SBSQ HOSP IP/OBS HIGH 50: CPT

## 2022-06-18 RX ORDER — POTASSIUM CHLORIDE 20 MEQ
40 PACKET (EA) ORAL EVERY 4 HOURS
Refills: 0 | Status: COMPLETED | OUTPATIENT
Start: 2022-06-18 | End: 2022-06-18

## 2022-06-18 RX ORDER — SPIRONOLACTONE 25 MG/1
25 TABLET, FILM COATED ORAL DAILY
Refills: 0 | Status: DISCONTINUED | OUTPATIENT
Start: 2022-06-18 | End: 2022-06-21

## 2022-06-18 RX ADMIN — PANTOPRAZOLE SODIUM 40 MILLIGRAM(S): 20 TABLET, DELAYED RELEASE ORAL at 05:49

## 2022-06-18 RX ADMIN — SACUBITRIL AND VALSARTAN 1 TABLET(S): 24; 26 TABLET, FILM COATED ORAL at 11:42

## 2022-06-18 RX ADMIN — Medication 40 MILLIEQUIVALENT(S): at 13:59

## 2022-06-18 RX ADMIN — ATORVASTATIN CALCIUM 40 MILLIGRAM(S): 80 TABLET, FILM COATED ORAL at 21:47

## 2022-06-18 RX ADMIN — DAPAGLIFLOZIN 10 MILLIGRAM(S): 10 TABLET, FILM COATED ORAL at 13:59

## 2022-06-18 RX ADMIN — Medication 81 MILLIGRAM(S): at 11:43

## 2022-06-18 RX ADMIN — Medication 60 MILLIGRAM(S): at 05:42

## 2022-06-18 RX ADMIN — Medication 50 MILLIGRAM(S): at 05:41

## 2022-06-18 RX ADMIN — Medication 40 MILLIEQUIVALENT(S): at 11:42

## 2022-06-18 RX ADMIN — Medication 60 MILLIGRAM(S): at 13:59

## 2022-06-18 RX ADMIN — SACUBITRIL AND VALSARTAN 1 TABLET(S): 24; 26 TABLET, FILM COATED ORAL at 21:47

## 2022-06-19 LAB
ALBUMIN SERPL ELPH-MCNC: 3.4 G/DL — SIGNIFICANT CHANGE UP (ref 3.3–5.2)
ALP SERPL-CCNC: 106 U/L — SIGNIFICANT CHANGE UP (ref 40–120)
ALT FLD-CCNC: 58 U/L — HIGH
ANION GAP SERPL CALC-SCNC: 8 MMOL/L — SIGNIFICANT CHANGE UP (ref 5–17)
AST SERPL-CCNC: 34 U/L — SIGNIFICANT CHANGE UP
BILIRUB SERPL-MCNC: 0.9 MG/DL — SIGNIFICANT CHANGE UP (ref 0.4–2)
BUN SERPL-MCNC: 26.8 MG/DL — HIGH (ref 8–20)
CALCIUM SERPL-MCNC: 8.8 MG/DL — SIGNIFICANT CHANGE UP (ref 8.6–10.2)
CHLORIDE SERPL-SCNC: 98 MMOL/L — SIGNIFICANT CHANGE UP (ref 98–107)
CO2 SERPL-SCNC: 36 MMOL/L — HIGH (ref 22–29)
CREAT SERPL-MCNC: 1.55 MG/DL — HIGH (ref 0.5–1.3)
EGFR: 54 ML/MIN/1.73M2 — LOW
GLUCOSE SERPL-MCNC: 93 MG/DL — SIGNIFICANT CHANGE UP (ref 70–99)
MAGNESIUM SERPL-MCNC: 1.9 MG/DL — SIGNIFICANT CHANGE UP (ref 1.6–2.6)
PHOSPHATE SERPL-MCNC: 3.2 MG/DL — SIGNIFICANT CHANGE UP (ref 2.4–4.7)
POTASSIUM SERPL-MCNC: 4 MMOL/L — SIGNIFICANT CHANGE UP (ref 3.5–5.3)
POTASSIUM SERPL-SCNC: 4 MMOL/L — SIGNIFICANT CHANGE UP (ref 3.5–5.3)
PROT SERPL-MCNC: 7 G/DL — SIGNIFICANT CHANGE UP (ref 6.6–8.7)
SODIUM SERPL-SCNC: 142 MMOL/L — SIGNIFICANT CHANGE UP (ref 135–145)

## 2022-06-19 PROCEDURE — 99233 SBSQ HOSP IP/OBS HIGH 50: CPT

## 2022-06-19 RX ADMIN — Medication 81 MILLIGRAM(S): at 13:10

## 2022-06-19 RX ADMIN — PANTOPRAZOLE SODIUM 40 MILLIGRAM(S): 20 TABLET, DELAYED RELEASE ORAL at 05:18

## 2022-06-19 RX ADMIN — ATORVASTATIN CALCIUM 40 MILLIGRAM(S): 80 TABLET, FILM COATED ORAL at 21:22

## 2022-06-19 RX ADMIN — SACUBITRIL AND VALSARTAN 1 TABLET(S): 24; 26 TABLET, FILM COATED ORAL at 09:44

## 2022-06-19 RX ADMIN — SACUBITRIL AND VALSARTAN 1 TABLET(S): 24; 26 TABLET, FILM COATED ORAL at 21:22

## 2022-06-19 RX ADMIN — SPIRONOLACTONE 25 MILLIGRAM(S): 25 TABLET, FILM COATED ORAL at 05:18

## 2022-06-19 RX ADMIN — Medication 50 MILLIGRAM(S): at 05:18

## 2022-06-19 RX ADMIN — Medication 60 MILLIGRAM(S): at 13:10

## 2022-06-19 RX ADMIN — Medication 60 MILLIGRAM(S): at 05:18

## 2022-06-19 RX ADMIN — DAPAGLIFLOZIN 10 MILLIGRAM(S): 10 TABLET, FILM COATED ORAL at 13:10

## 2022-06-20 LAB
ALBUMIN SERPL ELPH-MCNC: 3.1 G/DL — LOW (ref 3.3–5.2)
ALP SERPL-CCNC: 99 U/L — SIGNIFICANT CHANGE UP (ref 40–120)
ALT FLD-CCNC: 52 U/L — HIGH
ANION GAP SERPL CALC-SCNC: 12 MMOL/L — SIGNIFICANT CHANGE UP (ref 5–17)
AST SERPL-CCNC: 33 U/L — SIGNIFICANT CHANGE UP
BILIRUB SERPL-MCNC: 0.9 MG/DL — SIGNIFICANT CHANGE UP (ref 0.4–2)
BUN SERPL-MCNC: 26.4 MG/DL — HIGH (ref 8–20)
CALCIUM SERPL-MCNC: 8.7 MG/DL — SIGNIFICANT CHANGE UP (ref 8.6–10.2)
CHLORIDE SERPL-SCNC: 99 MMOL/L — SIGNIFICANT CHANGE UP (ref 98–107)
CO2 SERPL-SCNC: 29 MMOL/L — SIGNIFICANT CHANGE UP (ref 22–29)
CREAT SERPL-MCNC: 1.32 MG/DL — HIGH (ref 0.5–1.3)
EGFR: 65 ML/MIN/1.73M2 — SIGNIFICANT CHANGE UP
GLUCOSE SERPL-MCNC: 90 MG/DL — SIGNIFICANT CHANGE UP (ref 70–99)
HCT VFR BLD CALC: 46.7 % — SIGNIFICANT CHANGE UP (ref 39–50)
HGB BLD-MCNC: 14.9 G/DL — SIGNIFICANT CHANGE UP (ref 13–17)
MAGNESIUM SERPL-MCNC: 2 MG/DL — SIGNIFICANT CHANGE UP (ref 1.6–2.6)
MCHC RBC-ENTMCNC: 23.5 PG — LOW (ref 27–34)
MCHC RBC-ENTMCNC: 31.9 GM/DL — LOW (ref 32–36)
MCV RBC AUTO: 73.8 FL — LOW (ref 80–100)
PHOSPHATE SERPL-MCNC: 3.1 MG/DL — SIGNIFICANT CHANGE UP (ref 2.4–4.7)
PLATELET # BLD AUTO: 268 K/UL — SIGNIFICANT CHANGE UP (ref 150–400)
POTASSIUM SERPL-MCNC: 4.3 MMOL/L — SIGNIFICANT CHANGE UP (ref 3.5–5.3)
POTASSIUM SERPL-SCNC: 4.3 MMOL/L — SIGNIFICANT CHANGE UP (ref 3.5–5.3)
PROT SERPL-MCNC: 6.7 G/DL — SIGNIFICANT CHANGE UP (ref 6.6–8.7)
RBC # BLD: 6.33 M/UL — HIGH (ref 4.2–5.8)
RBC # FLD: 17.9 % — HIGH (ref 10.3–14.5)
SODIUM SERPL-SCNC: 140 MMOL/L — SIGNIFICANT CHANGE UP (ref 135–145)
WBC # BLD: 7.13 K/UL — SIGNIFICANT CHANGE UP (ref 3.8–10.5)
WBC # FLD AUTO: 7.13 K/UL — SIGNIFICANT CHANGE UP (ref 3.8–10.5)

## 2022-06-20 PROCEDURE — 99232 SBSQ HOSP IP/OBS MODERATE 35: CPT

## 2022-06-20 PROCEDURE — 99233 SBSQ HOSP IP/OBS HIGH 50: CPT

## 2022-06-20 PROCEDURE — 93010 ELECTROCARDIOGRAM REPORT: CPT

## 2022-06-20 RX ORDER — SACUBITRIL AND VALSARTAN 24; 26 MG/1; MG/1
1 TABLET, FILM COATED ORAL
Refills: 0 | Status: DISCONTINUED | OUTPATIENT
Start: 2022-06-20 | End: 2022-06-21

## 2022-06-20 RX ORDER — HYDROCORTISONE 1 %
1 OINTMENT (GRAM) TOPICAL THREE TIMES A DAY
Refills: 0 | Status: DISCONTINUED | OUTPATIENT
Start: 2022-06-20 | End: 2022-06-20

## 2022-06-20 RX ADMIN — DAPAGLIFLOZIN 10 MILLIGRAM(S): 10 TABLET, FILM COATED ORAL at 11:12

## 2022-06-20 RX ADMIN — Medication 30 MILLIGRAM(S): at 17:47

## 2022-06-20 RX ADMIN — Medication 60 MILLIGRAM(S): at 11:13

## 2022-06-20 RX ADMIN — SACUBITRIL AND VALSARTAN 1 TABLET(S): 24; 26 TABLET, FILM COATED ORAL at 11:11

## 2022-06-20 RX ADMIN — ATORVASTATIN CALCIUM 40 MILLIGRAM(S): 80 TABLET, FILM COATED ORAL at 21:33

## 2022-06-20 RX ADMIN — Medication 81 MILLIGRAM(S): at 11:12

## 2022-06-20 RX ADMIN — PANTOPRAZOLE SODIUM 40 MILLIGRAM(S): 20 TABLET, DELAYED RELEASE ORAL at 06:13

## 2022-06-20 RX ADMIN — Medication 60 MILLIGRAM(S): at 06:13

## 2022-06-20 RX ADMIN — Medication 50 MILLIGRAM(S): at 06:13

## 2022-06-20 RX ADMIN — SPIRONOLACTONE 25 MILLIGRAM(S): 25 TABLET, FILM COATED ORAL at 06:13

## 2022-06-20 RX ADMIN — SACUBITRIL AND VALSARTAN 1 TABLET(S): 24; 26 TABLET, FILM COATED ORAL at 17:47

## 2022-06-20 NOTE — PROGRESS NOTE ADULT - PROBLEM SELECTOR PLAN 1
Recently diagnosed dilated non-ischemic cardiomyopathy (LVEF <20%, LIVDd 6.8cm) w/ RV dysfunction.   BNP 9830  Clinically he apppears euvolemic  Will swtich to oral torsemide  Please check q12hrs BMP. Maintain K >4, Mg >2.  GDMT: Increase Entresto to 49-51mg BID. Start Aldactone 25mg daily. Continue Toprol-XL 50mg daily and Farxiga 10mg daily.  Mod-sev MR/TR - will reassess once euvolemic and on full GDMT  Plan for cardiac MRI to rule out infiltrative disease tomorrow  Device: Will plan to reassess EF after 3mo of maximally tolerated GDMT. Gen cards recommending LifeVest. Recently diagnosed dilated non-ischemic cardiomyopathy (LVEF <20%, LIVDd 6.8cm) w/ RV dysfunction.   BNP 9830  Clinically he apppears euvolemic  Will swtich to oral torsemide  Please check q12hrs BMP. Maintain K >4, Mg >2.  GDMT: Increase Entresto to 49-51mg BID. Start Aldactone 25mg daily. Continue Toprol-XL 50mg daily and Farxiga 10mg daily.  Mod-sev MR/TR - will reassess once euvolemic and on full GDMT  Plan was to obtain a cardiomems and cardiac MRI, will get an outpatient since he has to leave the hospital for his childrens graduation  Device: Will plan to reassess EF after 3mo of maximally tolerated GDMT. Gen cards recommending LifeVest.

## 2022-06-20 NOTE — PROGRESS NOTE ADULT - PROBLEM/PLAN-2
Spoke with patient. Two pt identifiers confirmed. Patient notified per Dr. 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 Patient advised that Dr. Trevor Davies would like for her to see a vascular surgeon. Patient given contact information for Dr. Kira Gold  Patient advised per Dr. Trevor Davies that she must quit smoking. Pt verbalized understanding of information discussed w/ no further questions at this time. DISPLAY PLAN FREE TEXT

## 2022-06-21 ENCOUNTER — TRANSCRIPTION ENCOUNTER (OUTPATIENT)
Age: 52
End: 2022-06-21

## 2022-06-21 VITALS
OXYGEN SATURATION: 96 % | RESPIRATION RATE: 18 BRPM | SYSTOLIC BLOOD PRESSURE: 111 MMHG | TEMPERATURE: 98 F | DIASTOLIC BLOOD PRESSURE: 82 MMHG | HEART RATE: 102 BPM

## 2022-06-21 DIAGNOSIS — I10 ESSENTIAL (PRIMARY) HYPERTENSION: ICD-10-CM

## 2022-06-21 LAB
ALBUMIN SERPL ELPH-MCNC: 3.3 G/DL — SIGNIFICANT CHANGE UP (ref 3.3–5.2)
ALP SERPL-CCNC: 104 U/L — SIGNIFICANT CHANGE UP (ref 40–120)
ALT FLD-CCNC: 58 U/L — HIGH
ANION GAP SERPL CALC-SCNC: 12 MMOL/L — SIGNIFICANT CHANGE UP (ref 5–17)
AST SERPL-CCNC: 35 U/L — SIGNIFICANT CHANGE UP
BILIRUB SERPL-MCNC: 0.8 MG/DL — SIGNIFICANT CHANGE UP (ref 0.4–2)
BUN SERPL-MCNC: 25 MG/DL — HIGH (ref 8–20)
CALCIUM SERPL-MCNC: 8.9 MG/DL — SIGNIFICANT CHANGE UP (ref 8.6–10.2)
CHLORIDE SERPL-SCNC: 98 MMOL/L — SIGNIFICANT CHANGE UP (ref 98–107)
CO2 SERPL-SCNC: 31 MMOL/L — HIGH (ref 22–29)
CREAT SERPL-MCNC: 1.57 MG/DL — HIGH (ref 0.5–1.3)
EGFR: 53 ML/MIN/1.73M2 — LOW
GLUCOSE SERPL-MCNC: 91 MG/DL — SIGNIFICANT CHANGE UP (ref 70–99)
HCT VFR BLD CALC: 50.4 % — HIGH (ref 39–50)
HGB BLD-MCNC: 16 G/DL — SIGNIFICANT CHANGE UP (ref 13–17)
MAGNESIUM SERPL-MCNC: 2.1 MG/DL — SIGNIFICANT CHANGE UP (ref 1.6–2.6)
MCHC RBC-ENTMCNC: 23.2 PG — LOW (ref 27–34)
MCHC RBC-ENTMCNC: 31.7 GM/DL — LOW (ref 32–36)
MCV RBC AUTO: 72.9 FL — LOW (ref 80–100)
PHOSPHATE SERPL-MCNC: 3.4 MG/DL — SIGNIFICANT CHANGE UP (ref 2.4–4.7)
PLATELET # BLD AUTO: 281 K/UL — SIGNIFICANT CHANGE UP (ref 150–400)
POTASSIUM SERPL-MCNC: 4 MMOL/L — SIGNIFICANT CHANGE UP (ref 3.5–5.3)
POTASSIUM SERPL-SCNC: 4 MMOL/L — SIGNIFICANT CHANGE UP (ref 3.5–5.3)
PROT SERPL-MCNC: 7.2 G/DL — SIGNIFICANT CHANGE UP (ref 6.6–8.7)
RBC # BLD: 6.91 M/UL — HIGH (ref 4.2–5.8)
RBC # FLD: 18.1 % — HIGH (ref 10.3–14.5)
SODIUM SERPL-SCNC: 141 MMOL/L — SIGNIFICANT CHANGE UP (ref 135–145)
TROPONIN T SERPL-MCNC: <0.01 NG/ML — SIGNIFICANT CHANGE UP (ref 0–0.06)
TROPONIN T SERPL-MCNC: <0.01 NG/ML — SIGNIFICANT CHANGE UP (ref 0–0.06)
WBC # BLD: 7.25 K/UL — SIGNIFICANT CHANGE UP (ref 3.8–10.5)
WBC # FLD AUTO: 7.25 K/UL — SIGNIFICANT CHANGE UP (ref 3.8–10.5)

## 2022-06-21 PROCEDURE — 36415 COLL VENOUS BLD VENIPUNCTURE: CPT

## 2022-06-21 PROCEDURE — C8929: CPT

## 2022-06-21 PROCEDURE — 93010 ELECTROCARDIOGRAM REPORT: CPT

## 2022-06-21 PROCEDURE — 85027 COMPLETE CBC AUTOMATED: CPT

## 2022-06-21 PROCEDURE — 0225U NFCT DS DNA&RNA 21 SARSCOV2: CPT

## 2022-06-21 PROCEDURE — 83036 HEMOGLOBIN GLYCOSYLATED A1C: CPT

## 2022-06-21 PROCEDURE — 80053 COMPREHEN METABOLIC PANEL: CPT

## 2022-06-21 PROCEDURE — 96374 THER/PROPH/DIAG INJ IV PUSH: CPT

## 2022-06-21 PROCEDURE — 85730 THROMBOPLASTIN TIME PARTIAL: CPT

## 2022-06-21 PROCEDURE — 99285 EMERGENCY DEPT VISIT HI MDM: CPT | Mod: 25

## 2022-06-21 PROCEDURE — 85025 COMPLETE CBC W/AUTO DIFF WBC: CPT

## 2022-06-21 PROCEDURE — 80048 BASIC METABOLIC PNL TOTAL CA: CPT

## 2022-06-21 PROCEDURE — 85610 PROTHROMBIN TIME: CPT

## 2022-06-21 PROCEDURE — 99239 HOSP IP/OBS DSCHRG MGMT >30: CPT

## 2022-06-21 PROCEDURE — 83880 ASSAY OF NATRIURETIC PEPTIDE: CPT

## 2022-06-21 PROCEDURE — 93005 ELECTROCARDIOGRAM TRACING: CPT

## 2022-06-21 PROCEDURE — 86703 HIV-1/HIV-2 1 RESULT ANTBDY: CPT

## 2022-06-21 PROCEDURE — 84443 ASSAY THYROID STIM HORMONE: CPT

## 2022-06-21 PROCEDURE — 84100 ASSAY OF PHOSPHORUS: CPT

## 2022-06-21 PROCEDURE — 84484 ASSAY OF TROPONIN QUANT: CPT

## 2022-06-21 PROCEDURE — 71046 X-RAY EXAM CHEST 2 VIEWS: CPT

## 2022-06-21 PROCEDURE — 83735 ASSAY OF MAGNESIUM: CPT

## 2022-06-21 RX ORDER — DAPAGLIFLOZIN 10 MG/1
1 TABLET, FILM COATED ORAL
Qty: 30 | Refills: 0
Start: 2022-06-21 | End: 2022-07-20

## 2022-06-21 RX ORDER — SACUBITRIL AND VALSARTAN 24; 26 MG/1; MG/1
1 TABLET, FILM COATED ORAL
Qty: 60 | Refills: 0
Start: 2022-06-21 | End: 2022-07-20

## 2022-06-21 RX ORDER — FUROSEMIDE 40 MG
1 TABLET ORAL
Qty: 0 | Refills: 0 | DISCHARGE

## 2022-06-21 RX ORDER — SPIRONOLACTONE 25 MG/1
1 TABLET, FILM COATED ORAL
Qty: 30 | Refills: 0
Start: 2022-06-21 | End: 2022-07-20

## 2022-06-21 RX ORDER — SACUBITRIL AND VALSARTAN 24; 26 MG/1; MG/1
1 TABLET, FILM COATED ORAL
Qty: 0 | Refills: 0 | DISCHARGE

## 2022-06-21 RX ADMIN — Medication 50 MILLIGRAM(S): at 05:07

## 2022-06-21 RX ADMIN — PANTOPRAZOLE SODIUM 40 MILLIGRAM(S): 20 TABLET, DELAYED RELEASE ORAL at 05:07

## 2022-06-21 RX ADMIN — Medication 30 MILLIGRAM(S): at 05:08

## 2022-06-21 RX ADMIN — Medication 30 MILLIGRAM(S): at 12:05

## 2022-06-21 RX ADMIN — SACUBITRIL AND VALSARTAN 1 TABLET(S): 24; 26 TABLET, FILM COATED ORAL at 05:08

## 2022-06-21 RX ADMIN — SACUBITRIL AND VALSARTAN 1 TABLET(S): 24; 26 TABLET, FILM COATED ORAL at 17:30

## 2022-06-21 RX ADMIN — Medication 81 MILLIGRAM(S): at 12:08

## 2022-06-21 RX ADMIN — DAPAGLIFLOZIN 10 MILLIGRAM(S): 10 TABLET, FILM COATED ORAL at 12:05

## 2022-06-21 RX ADMIN — SPIRONOLACTONE 25 MILLIGRAM(S): 25 TABLET, FILM COATED ORAL at 05:07

## 2022-06-21 NOTE — DISCHARGE NOTE NURSING/CASE MANAGEMENT/SOCIAL WORK - PATIENT PORTAL LINK FT
You can access the FollowMyHealth Patient Portal offered by [facility] by registering at the following website: [email or web address]. By joining [facility]’s FollowMyHealth portal, you will also be able to view your health information using other applications (apps) compatible with our system.

## 2022-06-21 NOTE — PROGRESS NOTE ADULT - PROVIDER SPECIALTY LIST ADULT
Hospitalist
Cardiology
Hospitalist
Heart Failure

## 2022-06-21 NOTE — PROGRESS NOTE ADULT - SUBJECTIVE AND OBJECTIVE BOX
[place] [facility] - [facility], THE [facility]ART [facility]                                   [address] [place] [address]                                                      PHONE: [phone]                                                         FAX: [phone]  [email or web address]  ---------------------------------------------------------------------------------------------------------------------------------    Overnight events/patient complaints:  NAD feeling much better today    No Known Allergies    MEDICATIONS  (STANDING):  aspirin enteric coated 81 milliGRAM(s) Oral daily  atorvastatin 40 milliGRAM(s) Oral at bedtime  dapagliflozin 10 milliGRAM(s) Oral every 24 hours  furosemide   Injectable 60 milliGRAM(s) IV Push two times a day  hydrocortisone 2.5% Ointment 1 Application(s) Topical three times a day  metoprolol succinate ER 50 milliGRAM(s) Oral daily  pantoprazole    Tablet 40 milliGRAM(s) Oral before breakfast  sacubitril 24 mG/valsartan 26 mG 1 Tablet(s) Oral every 12 hours  spironolactone 25 milliGRAM(s) Oral daily    MEDICATIONS  (PRN):  famotidine Injectable 20 milliGRAM(s) IV Push two times a day PRN dyspepsia      Vital Signs Last 24 Hrs  T(C): 36.8 (20 Jun 2022 05:17), Max: 37 (19 Jun 2022 20:30)  T(F): 98.3 (20 Jun 2022 05:17), Max: 98.6 (19 Jun 2022 20:30)  HR: 113 (20 Jun 2022 08:40) (102 - 113)  BP: 115/76 (20 Jun 2022 05:17) (112/75 - 127/80)  BP(mean): --  RR: 18 (20 Jun 2022 05:17) (18 - 18)  SpO2: 98% (20 Jun 2022 05:17) (98% - 98%)  ICU Vital Signs Last 24 Hrs  JENNIFER BARRIENTOS  I&O's Detail    19 Jun 2022 07:01  -  20 Jun 2022 07:00  --------------------------------------------------------  IN:    Oral Fluid: 480 mL  Total IN: 480 mL    OUT:    Voided (mL): 2000 mL  Total OUT: 2000 mL    Total NET: -1520 mL        I&O's Summary    19 Jun 2022 07:01  -  20 Jun 2022 07:00  --------------------------------------------------------  IN: 480 mL / OUT: 2000 mL / NET: -1520 mL      Drug Dosing Weight  JENNIFER BARRIENTOS      PHYSICAL EXAM:  General: Appears well developed, alert and cooperative.  HEENT: Head; normocephalic, atraumatic.  Eyes: Pupils reactive, cornea wnl.  Neck: Supple, no nodes adenopathy, no NVD or carotid bruit or thyromegaly.  CARDIOVASCULAR: Normal S1 and S2, 2/6 murmur, rub, gallop or lift.   LUNGS: No rales, rhonchi or wheeze. Normal breath sounds bilaterally.  ABDOMEN: Soft, nontender without mass or organomegaly. bowel sounds normoactive.  EXTREMITIES: No clubbing, cyanosis or edema. Distal pulses wnl.   SKIN: warm and dry with normal turgor.  NEURO: Alert/oriented x 3/normal motor exam. No pathologic reflexes.    PSYCH: normal affect.        LABS:                        14.9   7.13  )-----------( 268      ( 20 Jun 2022 06:23 )             46.7     06-20    140  |  99  |  26.4<H>  ----------------------------<  90  4.3   |  29.0  |  1.32<H>    Ca    8.7      20 Jun 2022 06:23  Phos  3.1     06-20  Mg     2.0     06-20    TPro  6.7  /  Alb  3.1<L>  /  TBili  0.9  /  DBili  x   /  AST  33  /  ALT  52<H>  /  AlkPhos  99  06-20    JENNIFER BARRIENTOS            RADIOLOGY & ADDITIONAL STUDIES:    INTERPRETATION OF TELEMETRY (personally reviewed):        CARDIAC CATHETERIZATION: recent negative for CAD     ASSESSMENT AND PLAN:  In summary, JENNIFER BARRIENTOS is an 52y Male with past medical history significant for NICM EF ~ 15 % functional MR mild CKD who presents with acute on chronic HFrEF       clinically improving     Plan  Awaiting cardiac MRI   Awaiting lifevest   continue Farxiga for HFrEF management  Continue current CV medications   change to Torsemide 30 mg po BID and monitor renal panel     
Interval History: no acute events    Medications:  aspirin enteric coated 81 milliGRAM(s) Oral daily  atorvastatin 40 milliGRAM(s) Oral at bedtime  dapagliflozin 10 milliGRAM(s) Oral every 24 hours  famotidine Injectable 20 milliGRAM(s) IV Push two times a day PRN  metoprolol succinate ER 50 milliGRAM(s) Oral daily  pantoprazole    Tablet 40 milliGRAM(s) Oral before breakfast  sacubitril 24 mG/valsartan 26 mG 1 Tablet(s) Oral every 12 hours  spironolactone 25 milliGRAM(s) Oral daily  torsemide 30 milliGRAM(s) Oral two times a day      Vitals:  T(C): 36.7 (06-20-22 @ 11:13), Max: 37 (06-19-22 @ 20:30)  HR: 102 (06-20-22 @ 11:13) (102 - 113)  BP: 104/72 (06-20-22 @ 11:13) (104/72 - 115/76)  BP(mean): --  RR: 18 (06-20-22 @ 11:13) (18 - 18)  SpO2: 95% (06-20-22 @ 11:13) (95% - 98%)    Daily     Daily         I&O's Summary    19 Jun 2022 07:01  -  20 Jun 2022 07:00  --------------------------------------------------------  IN: 480 mL / OUT: 2000 mL / NET: -1520 mL        Physical Exam:  Appearance: No Acute Distress  HEENT: PERRL  Neck: No JVD  Cardiovascular: Normal S1 S2, No murmurs/rubs/gallops  Respiratory: Clear to auscultation bilaterally  Gastrointestinal: Soft, Non-tender	  Skin: No cyanosis	  Neurologic: Non-focal  Extremities: No LE edema  Psychiatry: A & O x 3, Mood & affect appropriate    Labs:                        14.9   7.13  )-----------( 268      ( 20 Jun 2022 06:23 )             46.7     06-20    140  |  99  |  26.4<H>  ----------------------------<  90  4.3   |  29.0  |  1.32<H>    Ca    8.7      20 Jun 2022 06:23  Phos  3.1     06-20  Mg     2.0     06-20    TPro  6.7  /  Alb  3.1<L>  /  TBili  0.9  /  DBili  x   /  AST  33  /  ALT  52<H>  /  AlkPhos  99  06-20    
                [place] [facility] - [facility], THE HEART CENTER                                   [address] [place] [address]                                                      PHONE: [phone]                                                         FAX: [phone]  [email or web address]  ---------------------------------------------------------------------------------------------------------------------------------    Overnight events/patient complaints:  feeling better today     No Known Allergies    MEDICATIONS  (STANDING):  aspirin enteric coated 81 milliGRAM(s) Oral daily  atorvastatin 40 milliGRAM(s) Oral at bedtime  dapagliflozin 10 milliGRAM(s) Oral every 24 hours  furosemide   Injectable 80 milliGRAM(s) IV Push two times a day  metoprolol succinate ER 50 milliGRAM(s) Oral daily  pantoprazole    Tablet 40 milliGRAM(s) Oral before breakfast  sacubitril 24 mG/valsartan 26 mG 1 Tablet(s) Oral every 12 hours    MEDICATIONS  (PRN):  famotidine Injectable 20 milliGRAM(s) IV Push two times a day PRN dyspepsia      Vital Signs Last 24 Hrs  T(C): 36.9 (16 Jun 2022 05:03), Max: 36.9 (15 Braulio 2022 11:13)  T(F): 98.4 (16 Jun 2022 05:03), Max: 98.4 (15 Braulio 2022 11:13)  HR: 61 (16 Jun 2022 05:03) (61 - 106)  BP: 146/92 (16 Jun 2022 07:13) (127/84 - 167/115)  BP(mean): 13 (15 Braulio 2022 14:20) (13 - 132)  RR: 18 (16 Jun 2022 05:03) (18 - 18)  SpO2: 98% (16 Jun 2022 05:03) (95% - 100%)  ICU Vital Signs Last 24 Hrs  JENNIFER BARRIENTOS  I&O's Detail    15 Braulio 2022 07:01  -  16 Jun 2022 07:00  --------------------------------------------------------  IN:    Oral Fluid: 540 mL  Total IN: 540 mL    OUT:    Voided (mL): 4900 mL  Total OUT: 4900 mL    Total NET: -4360 mL        I&O's Summary    15 Braulio 2022 07:01  -  16 Jun 2022 07:00  --------------------------------------------------------  IN: 540 mL / OUT: 4900 mL / NET: -4360 mL      Drug Dosing Weight  JENNIFER BARRIENTOS      PHYSICAL EXAM:  General: Appears well developed, alert and cooperative.  HEENT: Head; normocephalic, atraumatic.  Eyes: Pupils reactive, cornea wnl.  Neck: Supple, no nodes adenopathy, no NVD or carotid bruit or thyromegaly.  CARDIOVASCULAR: Normal S1 and S2, 3/6  murmur, rub, gallop or lift.   LUNGS: Decrease BS at the lower bases   ABDOMEN: Soft, nontender without mass or organomegaly. bowel sounds normoactive.  EXTREMITIES: No clubbing, cyanosis ++ edema. Distal pulses wnl.   SKIN: warm and dry with normal turgor.  NEURO: Alert/oriented x 3/normal motor exam. No pathologic reflexes.    PSYCH: normal affect.        LABS:                        13.5   7.05  )-----------( 265      ( 16 Jun 2022 05:43 )             43.5     06-16    145  |  103  |  29.2<H>  ----------------------------<  82  3.5   |  31.0<H>  |  1.56<H>    Ca    8.4<L>      16 Jun 2022 05:43  Mg     2.1     06-15    TPro  6.4<L>  /  Alb  3.1<L>  /  TBili  0.8  /  DBili  x   /  AST  34  /  ALT  51<H>  /  AlkPhos  88  06-16    JENNIFER BARRIENTOS  CARDIAC MARKERS ( 15 Braulio 2022 06:32 )  x     / 0.01 ng/mL / x     / x     / x      CARDIAC MARKERS ( 15 Braulio 2022 03:13 )  x     / 0.01 ng/mL / x     / x     / x          PT/INR - ( 15 Braulio 2022 03:13 )   PT: 21.3 sec;   INR: 1.83 ratio         PTT - ( 15 Braulio 2022 03:13 )  PTT:28.6 sec      RADIOLOGY & ADDITIONAL STUDIES:    INTERPRETATION OF TELEMETRY (personally reviewed):        CARDIAC CATHETERIZATION: recent negative for CAD     ASSESSMENT AND PLAN:  In summary, JENNIFER BARRIENTOS is an 52y Male with past medical history significant for NICM EF ~ 15 % functional MR mild CKD who presents with acute on chronic HFrEF     Plan  Continue with IV Lasix and monitor renal panel   Start Farxiga for HFrEF management  Continue current CV medications   Awaiting repeat TTE    
                [place] [facility] - [facility], THE [facility]ART C[facility]                                   [address] [place] [address]                                                      PHONE: [phone]                                                         FAX: [phone]  [email or web address]  ---------------------------------------------------------------------------------------------------------------------------------    Overnight events/patient complaints:    Pt seen OOB  Slowly improving on IV diuresis  feeling better today     No Known Allergies    MEDICATIONS  (STANDING):  aspirin enteric coated 81 milliGRAM(s) Oral daily  atorvastatin 40 milliGRAM(s) Oral at bedtime  dapagliflozin 10 milliGRAM(s) Oral every 24 hours  furosemide   Injectable 80 milliGRAM(s) IV Push two times a day  metoprolol succinate ER 50 milliGRAM(s) Oral daily  pantoprazole    Tablet 40 milliGRAM(s) Oral before breakfast  sacubitril 24 mG/valsartan 26 mG 1 Tablet(s) Oral every 12 hours    MEDICATIONS  (PRN):  famotidine Injectable 20 milliGRAM(s) IV Push two times a day PRN dyspepsia      Vital Signs Last 24 Hrs  T(C): 36.9 (16 Jun 2022 05:03), Max: 36.9 (15 Braulio 2022 11:13)  T(F): 98.4 (16 Jun 2022 05:03), Max: 98.4 (15 Braulio 2022 11:13)  HR: 61 (16 Jun 2022 05:03) (61 - 106)  BP: 146/92 (16 Jun 2022 07:13) (127/84 - 167/115)  BP(mean): 13 (15 Braulio 2022 14:20) (13 - 132)  RR: 18 (16 Jun 2022 05:03) (18 - 18)  SpO2: 98% (16 Jun 2022 05:03) (95% - 100%)  ICU Vital Signs Last 24 Hrs  JENNIFER BARRIENTOS  I&O's Detail    15 Braulio 2022 07:01  -  16 Jun 2022 07:00  --------------------------------------------------------  IN:    Oral Fluid: 540 mL  Total IN: 540 mL    OUT:    Voided (mL): 4900 mL  Total OUT: 4900 mL    Total NET: -4360 mL        I&O's Summary    15 Braulio 2022 07:01  -  16 Jun 2022 07:00  --------------------------------------------------------  IN: 540 mL / OUT: 4900 mL / NET: -4360 mL      Drug Dosing Weight  JENNIFER FLOYD      PHYSICAL EXAM:    General: Appears well developed, alert and cooperative.  HEENT: Head; normocephalic, atraumatic.  Eyes: Pupils reactive, cornea wnl.  Neck: Supple, no nodes adenopathy, no NVD or carotid bruit or thyromegaly.  CARDIOVASCULAR: Normal S1 and S2, 3/6  murmur, rub, gallop or lift.   LUNGS: Decrease BS at the lower bases   ABDOMEN: Soft, nontender without mass or organomegaly. bowel sounds normoactive.  EXTREMITIES: No clubbing, cyanosis ++ edema. Distal pulses wnl.   SKIN: warm and dry with normal turgor.  NEURO: Alert/oriented x 3/normal motor exam. No pathologic reflexes.    PSYCH: normal affect.        LABS:                        13.5   7.05  )-----------( 265      ( 16 Jun 2022 05:43 )             43.5     06-16    145  |  103  |  29.2<H>  ----------------------------<  82  3.5   |  31.0<H>  |  1.56<H>    Ca    8.4<L>      16 Jun 2022 05:43  Mg     2.1     06-15    TPro  6.4<L>  /  Alb  3.1<L>  /  TBili  0.8  /  DBili  x   /  AST  34  /  ALT  51<H>  /  AlkPhos  88  06-16    JENNIFER BARRIENTOS  CARDIAC MARKERS ( 15 Braulio 2022 06:32 )  x     / 0.01 ng/mL / x     / x     / x      CARDIAC MARKERS ( 15 Braulio 2022 03:13 )  x     / 0.01 ng/mL / x     / x     / x          PT/INR - ( 15 Braulio 2022 03:13 )   PT: 21.3 sec;   INR: 1.83 ratio         PTT - ( 15 Braulio 2022 03:13 )  PTT:28.6 sec      RADIOLOGY & ADDITIONAL STUDIES:    INTERPRETATION OF TELEMETRY (personally reviewed):        CARDIAC CATHETERIZATION: recent negative for CAD     ASSESSMENT AND PLAN:  In summary, JENNIFER BARRIENTOS is an 52y Male with past medical history significant for NICM EF ~ 15 % mod severe MR mild CKD who presents with acute on chronic HFrEF     Plan    Tele  Continue with IV Lasix and monitor renal panel   Start Farxiga for HFrEF management  Continue current CV medications   Heart Failure Consult Dr Ashford  
                [place] [facility] - [facility], [facility] C[facility]                                   [address] [place] [address]                                                      PHONE: [phone]                                                         FAX: [phone]  [email or web address]  ---------------------------------------------------------------------------------------------------------------------------------    Patient resting.  Feeling better     Overnight events/patient complaints:    MEDICATIONS  (STANDING):  aspirin enteric coated 81 milliGRAM(s) Oral daily  atorvastatin 40 milliGRAM(s) Oral at bedtime  dapagliflozin 10 milliGRAM(s) Oral every 24 hours  furosemide   Injectable 60 milliGRAM(s) IV Push two times a day  metoprolol succinate ER 50 milliGRAM(s) Oral daily  pantoprazole    Tablet 40 milliGRAM(s) Oral before breakfast  potassium chloride   Powder 40 milliEquivalent(s) Oral every 4 hours  sacubitril 24 mG/valsartan 26 mG 1 Tablet(s) Oral every 12 hours    MEDICATIONS  (PRN):  famotidine Injectable 20 milliGRAM(s) IV Push two times a day PRN dyspepsia      Vital Signs Last 24 Hrs  T(C): 36.7 (18 Jun 2022 05:07), Max: 37.3 (17 Jun 2022 19:44)  T(F): 98.1 (18 Jun 2022 05:07), Max: 99.1 (17 Jun 2022 19:44)  HR: 105 (18 Jun 2022 05:07) (98 - 107)  BP: 129/80 (18 Jun 2022 05:07) (123/84 - 140/99)  BP(mean): --  RR: 18 (18 Jun 2022 05:07) (18 - 18)  SpO2: 98% (18 Jun 2022 05:07) (95% - 98%)  I&O's Summary    17 Jun 2022 07:01  -  18 Jun 2022 07:00  --------------------------------------------------------  IN: 140 mL / OUT: 7800 mL / NET: -7660 mL        PHYSICAL EXAM:  Constitutional: Oriented to time, place and person. Appears well developed, well nourished; alert and co-operative  HEENT:     HeadNormal oral mucosa, PERRL, EOMI	  Cardiovascular: Normal S1 S2, No JVD, No murmurs  Respiratory: Lungs clear to auscultation; no crepitations, no wheeze  Gastrointestinal:  Soft, Non-tender, + BS	  Skin: Warm and dry  Neurologic: Alert oriented to time place and person  Psychiatric: affect was normal        LABS:                        14.2   6.35  )-----------( 260      ( 17 Jun 2022 05:45 )             45.1     06-17    147<H>  |  100  |  25.7<H>  ----------------------------<  83  3.4<L>   |  36.0<H>  |  1.56<H>    Ca    8.8      17 Jun 2022 05:45      JENNIFER BARRIENTOS        ASSESSMENT AND PLAN:  In summary, JENNIFER BARRIENTOS is an 52y Male with past medical history significant for NICM EF ~ 15 % mod severe MR mild CKD who presents with acute on chronic HFrEF, doing bettter now. Being followed by advanced heart failure team.    Recommendation    Continue with IV Lasix and monitor renal panel   Continue Farxiga, Entresto, toprol  Cardiac MRI?,  Life vest?    
                [place] [facility]                                   [address] [place] [address]                                                      PHONE: [phone]                                                         FAX: [phone]  [email or web address]  ---------------------------------------------------------------------------------------------------------------------------------    Overnight events/patient complaints: patient seen at bedside. he feels well today.       No Known Allergies    MEDICATIONS  (STANDING):  aspirin enteric coated 81 milliGRAM(s) Oral daily  atorvastatin 40 milliGRAM(s) Oral at bedtime  dapagliflozin 10 milliGRAM(s) Oral every 24 hours  metoprolol succinate ER 50 milliGRAM(s) Oral daily  pantoprazole    Tablet 40 milliGRAM(s) Oral before breakfast  sacubitril 49 mG/valsartan 51 mG 1 Tablet(s) Oral two times a day  spironolactone 25 milliGRAM(s) Oral daily  torsemide 30 milliGRAM(s) Oral two times a day    MEDICATIONS  (PRN):  famotidine Injectable 20 milliGRAM(s) IV Push two times a day PRN dyspepsia      Vital Signs Last 24 Hrs  T(C): 36.8 (21 Jun 2022 10:26), Max: 37.1 (20 Jun 2022 20:15)  T(F): 98.3 (21 Jun 2022 10:26), Max: 98.7 (20 Jun 2022 20:15)  HR: 102 (21 Jun 2022 10:26) (102 - 106)  BP: 105/74 (21 Jun 2022 10:26) (105/74 - 109/75)  BP(mean): --  RR: 18 (21 Jun 2022 10:26) (18 - 18)  SpO2: 95% (21 Jun 2022 10:26) (95% - 98%)  ICU Vital Signs Last 24 Hrs  JENNIFER BARRIENTOS  I&O's Detail    20 Jun 2022 07:01  -  21 Jun 2022 07:00  --------------------------------------------------------  IN:    Oral Fluid: 1058 mL  Total IN: 1058 mL    OUT:    Voided (mL): 5575 mL  Total OUT: 5575 mL    Total NET: -4517 mL      21 Jun 2022 07:01  -  21 Jun 2022 11:51  --------------------------------------------------------  IN:  Total IN: 0 mL    OUT:    Voided (mL): 600 mL  Total OUT: 600 mL    Total NET: -600 mL        Drug Dosing Weight  JENNIFER BARRIENTOS      PHYSICAL EXAM:  General: NAD  HEENT: Head; normocephalic, atraumatic.  Eyes: Pupils reactive, cornea wnl.  Neck: Supple, no nodes adenopathy, no NVD or carotid bruit or thyromegaly.  CARDIOVASCULAR: Normal S1 and S2, No murmur, rub, gallop or lift.   LUNGS: No rales, rhonchi or wheeze. Normal breath sounds bilaterally.  ABDOMEN: Soft, nontender without mass or organomegaly. bowel sounds normoactive.  EXTREMITIES: No clubbing, cyanosis or edema. Distal pulses wnl.   SKIN: warm and dry with normal turgor.  NEURO: Alert/oriented x 3  PSYCH: normal affect.        LABS:                        16.0   7.25  )-----------( 281      ( 21 Jun 2022 06:58 )             50.4     06-21    141  |  98  |  25.0<H>  ----------------------------<  91  4.0   |  31.0<H>  |  1.57<H>    Ca    8.9      21 Jun 2022 06:58  Phos  3.4     06-21  Mg     2.1     06-21    TPro  7.2  /  Alb  3.3  /  TBili  0.8  /  DBili  x   /  AST  35  /  ALT  58<H>  /  AlkPhos  104  06-21    JENNIFER BARRIENTOS  CARDIAC MARKERS ( 21 Jun 2022 06:58 )  x     / <0.01 ng/mL / x     / x     / x      CARDIAC MARKERS ( 21 Jun 2022 00:22 )  x     / <0.01 ng/mL / x     / x     / x          INTERPRETATION OF TELEMETRY (personally reviewed): sinus rhythm, rare PVCs      ASSESSMENT AND PLAN:  In summary, JENNIFER BARRIENTOS is an 52y Male with past medical history significant for NICM EF ~ 15 % functional MR mild CKD who presents with acute on chronic HFrEF.    - patient clinically improved with diuresis  - Toprol 50 mg daily and Entresto 49-51 bid  - Spironolactone 25 mg daily  - Torsemide 30 mg bid  - Farxiga 10 mg daily  - awaiting cardiac MRI  - patient has self pay insurance and will not cover LifeVest -- if he is agreeable, he can pay out of pocket. Otherwise he can be discharged with follow up for LifeVest as an outpatient.     Time Spent: 35 minutes       
                          Patient: JENNIFER BARRIENTOS [id number] 52y Male                            Hospitalist Attending Note    Continues to have significant urine output.  Denies SOB.  States he has been limiting po liquids.     ____________________PHYSICAL EXAM:  GENERAL:  NAD Alert and Oriented x 3   HEENT: NCAT  CARDIOVASCULAR:  S1, S2  LUNGS: CTAB  ABDOMEN:  soft, (-) tenderness, (-) distension, (+) bowel sounds, (-) guarding, (-) rebound (-) rigidity  EXTREMITIES:  no cyanosis / clubbing.  +++edema.   ____________________     VITALS:  Vital Signs Last 24 Hrs  T(C): 36.6 (2022 11:55), Max: 36.9 (15 Braulio 2022 23:21)  T(F): 97.8 (2022 11:55), Max: 98.4 (15 Braulio 2022 23:21)  HR: 98 (2022 11:55) (61 - 100)  BP: 124/89 (2022 11:55) (124/89 - 158/115)  BP(mean): --  RR: 18 (2022 11:55) (18 - 18)  SpO2: 96% (2022 11:55) (95% - 98%) Daily Height in cm: 177.8 (15 Braulio 2022 23:21)    Daily Weight in k.8 (2022 07:13)  CAPILLARY BLOOD GLUCOSE        I&O's Summary    15 Braulio 2022 07:01  -  2022 07:00  --------------------------------------------------------  IN: 540 mL / OUT: 4900 mL / NET: -4360 mL    2022 07:01  -  2022 15:19  --------------------------------------------------------  IN: 240 mL / OUT: 2000 mL / NET: -1760 mL        HISTORY:  PAST MEDICAL & SURGICAL HISTORY:  CHF, chronic      Hypertension      Hyperlipidemia      S/P hernia repair      Allergies    No Known Allergies    Intolerances       LABS:                        13.5   7.05  )-----------( 265      ( 2022 05:43 )             43.5     06-16    145  |  103  |  29.2<H>  ----------------------------<  82  3.5   |  31.0<H>  |  1.56<H>    Ca    8.4<L>      2022 05:43  Mg     2.1     06-15    TPro  6.4<L>  /  Alb  3.1<L>  /  TBili  0.8  /  DBili  x   /  AST  34  /  ALT  51<H>  /  AlkPhos  88  06-16    PT/INR - ( 15 Braulio 2022 03:13 )   PT: 21.3 sec;   INR: 1.83 ratio         PTT - ( 15 Braulio 2022 03:13 )  PTT:28.6 sec  LIVER FUNCTIONS - ( 2022 05:43 )  Alb: 3.1 g/dL / Pro: 6.4 g/dL / ALK PHOS: 88 U/L / ALT: 51 U/L / AST: 34 U/L / GGT: x             CARDIAC MARKERS ( 15 Braulio 2022 06:32 )  x     / 0.01 ng/mL / x     / x     / x      CARDIAC MARKERS ( 15 Braulio 2022 03:13 )  x     / 0.01 ng/mL / x     / x     / x              MEDICATIONS:  MEDICATIONS  (STANDING):  aspirin enteric coated 81 milliGRAM(s) Oral daily  atorvastatin 40 milliGRAM(s) Oral at bedtime  dapagliflozin 10 milliGRAM(s) Oral every 24 hours  furosemide   Injectable 80 milliGRAM(s) IV Push two times a day  metoprolol succinate ER 50 milliGRAM(s) Oral daily  pantoprazole    Tablet 40 milliGRAM(s) Oral before breakfast  sacubitril 24 mG/valsartan 26 mG 1 Tablet(s) Oral every 12 hours    MEDICATIONS  (PRN):  famotidine Injectable 20 milliGRAM(s) IV Push two times a day PRN dyspepsia  
                          Patient: JENNIFER BARRIENTOS [id number] 52y Male                            Hospitalist Attending Note    Sun at bedside.    Continues to have significant urine output.  Denies SOB - reports feeling much better.     ____________________PHYSICAL EXAM:  GENERAL:  NAD Alert and Oriented x 3   HEENT: NCAT  CARDIOVASCULAR:  S1, S2  LUNGS: CTAB  ABDOMEN:  soft, (-) tenderness, (-) distension, (+) bowel sounds, (-) guarding, (-) rebound (-) rigidity  EXTREMITIES:  no cyanosis / clubbing.  +++edema.   : + scrotal edema, no tenderness.   ____________________    VITALS:  Vital Signs Last 24 Hrs  T(C): 36.7 (17 Jun 2022 08:42), Max: 36.9 (17 Jun 2022 04:10)  T(F): 98.1 (17 Jun 2022 08:42), Max: 98.5 (17 Jun 2022 04:10)  HR: 98 (17 Jun 2022 12:44) (95 - 105)  BP: 123/84 (17 Jun 2022 12:44) (123/84 - 153/110)  BP(mean): --  RR: 18 (17 Jun 2022 12:44) (18 - 18)  SpO2: 97% (17 Jun 2022 12:44) (95% - 97%) Daily     Daily   CAPILLARY BLOOD GLUCOSE        I&O's Summary    16 Jun 2022 07:01  -  17 Jun 2022 07:00  --------------------------------------------------------  IN: 240 mL / OUT: 11717 mL / NET: -96020 mL    17 Jun 2022 07:01  -  17 Jun 2022 14:59  --------------------------------------------------------  IN: 0 mL / OUT: 4300 mL / NET: -4300 mL        LABS:                        14.2   6.35  )-----------( 260      ( 17 Jun 2022 05:45 )             45.1     06-17    147<H>  |  100  |  25.7<H>  ----------------------------<  83  3.4<L>   |  36.0<H>  |  1.56<H>    Ca    8.8      17 Jun 2022 05:45    TPro  6.4<L>  /  Alb  3.1<L>  /  TBili  0.8  /  DBili  x   /  AST  34  /  ALT  51<H>  /  AlkPhos  88  06-16      LIVER FUNCTIONS - ( 16 Jun 2022 05:43 )  Alb: 3.1 g/dL / Pro: 6.4 g/dL / ALK PHOS: 88 U/L / ALT: 51 U/L / AST: 34 U/L / GGT: x                     MEDICATIONS:  aspirin enteric coated 81 milliGRAM(s) Oral daily  atorvastatin 40 milliGRAM(s) Oral at bedtime  dapagliflozin 10 milliGRAM(s) Oral every 24 hours  famotidine Injectable 20 milliGRAM(s) IV Push two times a day PRN  furosemide   Injectable 80 milliGRAM(s) IV Push two times a day  metoprolol succinate ER 50 milliGRAM(s) Oral daily  pantoprazole    Tablet 40 milliGRAM(s) Oral before breakfast  sacubitril 24 mG/valsartan 26 mG 1 Tablet(s) Oral every 12 hours    
[facility] Division of Hospital Medicine  Jesse Boyer MD    Chief Complaint:  Patient is a 52y old  Male who presents with a chief complaint of Swelling (17 Jun 2022 17:08)      SUBJECTIVE / OVERNIGHT EVENTS:  Patient seen and examined at bedside. No acute events reported overnight. No new complaints. Feels much better.     Patient denies chest pain, SOB, abd pain, N/V, fever, chills, dysuria or any other complaints. All remainder ROS negative.     MEDICATIONS  (STANDING):  aspirin enteric coated 81 milliGRAM(s) Oral daily  atorvastatin 40 milliGRAM(s) Oral at bedtime  dapagliflozin 10 milliGRAM(s) Oral every 24 hours  furosemide   Injectable 60 milliGRAM(s) IV Push two times a day  metoprolol succinate ER 50 milliGRAM(s) Oral daily  pantoprazole    Tablet 40 milliGRAM(s) Oral before breakfast  sacubitril 24 mG/valsartan 26 mG 1 Tablet(s) Oral every 12 hours    MEDICATIONS  (PRN):  famotidine Injectable 20 milliGRAM(s) IV Push two times a day PRN dyspepsia        I&O's Summary    17 Jun 2022 07:01  -  18 Jun 2022 07:00  --------------------------------------------------------  IN: 140 mL / OUT: 7800 mL / NET: -7660 mL        PHYSICAL EXAM:  Vital Signs Last 24 Hrs  T(C): 36.7 (18 Jun 2022 05:07), Max: 37.3 (17 Jun 2022 19:44)  T(F): 98.1 (18 Jun 2022 05:07), Max: 99.1 (17 Jun 2022 19:44)  HR: 105 (18 Jun 2022 05:07) (98 - 107)  BP: 129/80 (18 Jun 2022 05:07) (123/84 - 140/99)  BP(mean): --  RR: 18 (18 Jun 2022 05:07) (18 - 18)  SpO2: 98% (18 Jun 2022 05:07) (95% - 98%)        CONSTITUTIONAL: NAD  HEENT: NC/AT, PERRL, no JVD  RESPIRATORY: CTA bilaterally, normal effort  CARDIOVASCULAR: RRR, S1/S2+, no m/g/r  ABDOMEN: Nontender to palpation, normoactive bowel sounds, no rebound/guarding  MUSCULOSKELETAL: +LE swelling  PSYCH: Calm, affect appropriate.  NEUROLOGY: Awake, alert, no focal neurological deficits.   SKIN: No rashes; no palpable lesions  VASC: Distal pulses palpable    LABS:                        14.2   6.35  )-----------( 260      ( 17 Jun 2022 05:45 )             45.1     06-17    147<H>  |  100  |  25.7<H>  ----------------------------<  83  3.4<L>   |  36.0<H>  |  1.56<H>    Ca    8.8      17 Jun 2022 05:45                CAPILLARY BLOOD GLUCOSE            RADIOLOGY & ADDITIONAL TESTS:  Results Reviewed:   Imaging Personally Reviewed:  Electrocardiogram Personally Reviewed:                                          
[facility] Division of Hospital Medicine  Jesse Boyer MD    Chief Complaint:  Patient is a 52y old  Male who presents with a chief complaint of Swelling (19 Jun 2022 09:21)      SUBJECTIVE / OVERNIGHT EVENTS:  Patient seen and examined at bedside. No acute events reported overnight. No new complaints.    Patient denies chest pain, SOB, abd pain, N/V, fever, chills, dysuria or any other complaints. All remainder ROS negative.     MEDICATIONS  (STANDING):  aspirin enteric coated 81 milliGRAM(s) Oral daily  atorvastatin 40 milliGRAM(s) Oral at bedtime  dapagliflozin 10 milliGRAM(s) Oral every 24 hours  furosemide   Injectable 60 milliGRAM(s) IV Push two times a day  metoprolol succinate ER 50 milliGRAM(s) Oral daily  pantoprazole    Tablet 40 milliGRAM(s) Oral before breakfast  sacubitril 24 mG/valsartan 26 mG 1 Tablet(s) Oral every 12 hours  spironolactone 25 milliGRAM(s) Oral daily    MEDICATIONS  (PRN):  famotidine Injectable 20 milliGRAM(s) IV Push two times a day PRN dyspepsia        I&O's Summary    19 Jun 2022 07:01  -  20 Jun 2022 07:00  --------------------------------------------------------  IN: 480 mL / OUT: 2000 mL / NET: -1520 mL        PHYSICAL EXAM:  Vital Signs Last 24 Hrs  T(C): 36.8 (20 Jun 2022 05:17), Max: 37 (19 Jun 2022 20:30)  T(F): 98.3 (20 Jun 2022 05:17), Max: 98.6 (19 Jun 2022 20:30)  HR: 113 (20 Jun 2022 08:40) (102 - 113)  BP: 115/76 (20 Jun 2022 05:17) (112/75 - 127/80)  BP(mean): --  RR: 18 (20 Jun 2022 05:17) (18 - 18)  SpO2: 98% (20 Jun 2022 05:17) (98% - 98%)        CONSTITUTIONAL: NAD  HEENT: NC/AT, PERRL, no JVD  RESPIRATORY: CTA bilaterally, normal effort  CARDIOVASCULAR: RRR, S1/S2+, no m/g/r  ABDOMEN: Nontender to palpation, normoactive bowel sounds, no rebound/guarding  MUSCULOSKELETAL: LE edema (improved)  PSYCH: Calm, affect appropriate.  NEUROLOGY: Awake, alert, no focal neurological deficits.   SKIN: No rashes; no palpable lesions  VASC: Distal pulses palpable    LABS:                        14.9   7.13  )-----------( 268      ( 20 Jun 2022 06:23 )             46.7     06-20    140  |  99  |  26.4<H>  ----------------------------<  90  4.3   |  29.0  |  1.32<H>    Ca    8.7      20 Jun 2022 06:23  Phos  3.1     06-20  Mg     2.0     06-20    TPro  6.7  /  Alb  3.1<L>  /  TBili  0.9  /  DBili  x   /  AST  33  /  ALT  52<H>  /  AlkPhos  99  06-20              CAPILLARY BLOOD GLUCOSE            RADIOLOGY & ADDITIONAL TESTS:  Results Reviewed:   Imaging Personally Reviewed:  Electrocardiogram Personally Reviewed:                                          
[facility] Division of Hospital Medicine  Jesse Boyer MD    Chief Complaint:  Patient is a 52y old  Male who presents with a chief complaint of Swelling (20 Jun 2022 12:58)      SUBJECTIVE / OVERNIGHT EVENTS:  Patient seen and examined at bedside. No acute events reported overnight. No new complaints.    Patient denies chest pain, SOB, abd pain, N/V, fever, chills, dysuria or any other complaints. All remainder ROS negative.     MEDICATIONS  (STANDING):  aspirin enteric coated 81 milliGRAM(s) Oral daily  atorvastatin 40 milliGRAM(s) Oral at bedtime  dapagliflozin 10 milliGRAM(s) Oral every 24 hours  metoprolol succinate ER 50 milliGRAM(s) Oral daily  pantoprazole    Tablet 40 milliGRAM(s) Oral before breakfast  sacubitril 49 mG/valsartan 51 mG 1 Tablet(s) Oral two times a day  spironolactone 25 milliGRAM(s) Oral daily  torsemide 30 milliGRAM(s) Oral two times a day    MEDICATIONS  (PRN):  famotidine Injectable 20 milliGRAM(s) IV Push two times a day PRN dyspepsia        I&O's Summary    20 Jun 2022 07:01  -  21 Jun 2022 07:00  --------------------------------------------------------  IN: 1058 mL / OUT: 5575 mL / NET: -4517 mL    21 Jun 2022 07:01  -  21 Jun 2022 10:00  --------------------------------------------------------  IN: 0 mL / OUT: 600 mL / NET: -600 mL        PHYSICAL EXAM:  Vital Signs Last 24 Hrs  T(C): 36.8 (21 Jun 2022 04:36), Max: 37.1 (20 Jun 2022 20:15)  T(F): 98.3 (21 Jun 2022 04:36), Max: 98.7 (20 Jun 2022 20:15)  HR: 106 (21 Jun 2022 04:36) (102 - 106)  BP: 109/75 (21 Jun 2022 04:36) (104/72 - 109/75)  BP(mean): --  RR: 18 (21 Jun 2022 04:36) (18 - 18)  SpO2: 97% (21 Jun 2022 04:36) (95% - 98%)        CONSTITUTIONAL: NAD  HEENT: NC/AT, PERRL, no JVD  RESPIRATORY: CTA bilaterally, normal effort  CARDIOVASCULAR: RRR, S1/S2+, no m/g/r  ABDOMEN: Nontender to palpation, normoactive bowel sounds, no rebound/guarding  MUSCULOSKELETAL: LE edema (improved)  PSYCH: Calm, affect appropriate.  NEUROLOGY: Awake, alert, no focal neurological deficits.   SKIN: No rashes; no palpable lesions  VASC: Distal pulses palpable    LABS:                        16.0   7.25  )-----------( 281      ( 21 Jun 2022 06:58 )             50.4     06-21    141  |  98  |  25.0<H>  ----------------------------<  91  4.0   |  31.0<H>  |  1.57<H>    Ca    8.9      21 Jun 2022 06:58  Phos  3.4     06-21  Mg     2.1     06-21    TPro  7.2  /  Alb  3.3  /  TBili  0.8  /  DBili  x   /  AST  35  /  ALT  58<H>  /  AlkPhos  104  06-21      CARDIAC MARKERS ( 21 Jun 2022 06:58 )  x     / <0.01 ng/mL / x     / x     / x      CARDIAC MARKERS ( 21 Jun 2022 00:22 )  x     / <0.01 ng/mL / x     / x     / x              CAPILLARY BLOOD GLUCOSE            RADIOLOGY & ADDITIONAL TESTS:  Results Reviewed:   Imaging Personally Reviewed:  Electrocardiogram Personally Reviewed:                                          
[facility] Division of Hospital Medicine  Jesse Boyer MD    Chief Complaint:  Patient is a 52y old  Male who presents with a chief complaint of Swelling (18 Jun 2022 09:59)      SUBJECTIVE / OVERNIGHT EVENTS:  Patient seen and examined at bedside. No acute events reported overnight. No new complaints. Feels better, wants to go home.     Patient denies chest pain, SOB, abd pain, N/V, fever, chills, dysuria or any other complaints. All remainder ROS negative.     MEDICATIONS  (STANDING):  aspirin enteric coated 81 milliGRAM(s) Oral daily  atorvastatin 40 milliGRAM(s) Oral at bedtime  dapagliflozin 10 milliGRAM(s) Oral every 24 hours  furosemide   Injectable 60 milliGRAM(s) IV Push two times a day  metoprolol succinate ER 50 milliGRAM(s) Oral daily  pantoprazole    Tablet 40 milliGRAM(s) Oral before breakfast  sacubitril 24 mG/valsartan 26 mG 1 Tablet(s) Oral every 12 hours  spironolactone 25 milliGRAM(s) Oral daily    MEDICATIONS  (PRN):  famotidine Injectable 20 milliGRAM(s) IV Push two times a day PRN dyspepsia        I&O's Summary    18 Jun 2022 07:01  -  19 Jun 2022 07:00  --------------------------------------------------------  IN: 216 mL / OUT: 6200 mL / NET: -5984 mL    19 Jun 2022 07:01  -  19 Jun 2022 09:25  --------------------------------------------------------  IN: 0 mL / OUT: 1100 mL / NET: -1100 mL        PHYSICAL EXAM:  Vital Signs Last 24 Hrs  T(C): 36.6 (19 Jun 2022 05:04), Max: 37.1 (18 Jun 2022 10:40)  T(F): 97.9 (19 Jun 2022 05:04), Max: 98.8 (18 Jun 2022 10:40)  HR: 101 (19 Jun 2022 05:04) (98 - 104)  BP: 134/82 (19 Jun 2022 05:04) (120/81 - 134/82)  BP(mean): --  RR: 18 (19 Jun 2022 05:04) (18 - 18)  SpO2: 97% (19 Jun 2022 05:04) (96% - 97%)        CONSTITUTIONAL: NAD  HEENT: NC/AT, PERRL, no JVD  RESPIRATORY: CTA bilaterally, normal effort  CARDIOVASCULAR: RRR, S1/S2+, no m/g/r  ABDOMEN: Nontender to palpation, normoactive bowel sounds, no rebound/guarding  MUSCULOSKELETAL: +LE swelling (improved).  PSYCH: Calm, affect appropriate.  NEUROLOGY: Awake, alert, no focal neurological deficits.   SKIN: No rashes; no palpable lesions  VASC: Distal pulses palpable    LABS:    06-18    144  |  96<L>  |  27.8<H>  ----------------------------<  77  3.9   |  35.0<H>  |  1.53<H>    Ca    8.6      18 Jun 2022 09:38  Phos  3.4     06-18  Mg     1.9     06-18                CAPILLARY BLOOD GLUCOSE            RADIOLOGY & ADDITIONAL TESTS:  Results Reviewed:   Imaging Personally Reviewed:  Electrocardiogram Personally Reviewed:

## 2022-06-21 NOTE — PROGRESS NOTE ADULT - REASON FOR ADMISSION
Swelling

## 2022-06-21 NOTE — DISCHARGE NOTE PROVIDER - ATTENDING DISCHARGE PHYSICAL EXAMINATION:
Vital Signs Last 24 Hrs  T(F): 98.3 (21 Jun 2022 10:26), Max: 98.7 (20 Jun 2022 20:15)  HR: 102 (21 Jun 2022 10:26) (102 - 106)  BP: 105/74 (21 Jun 2022 10:26) (105/74 - 109/75)  RR: 18 (21 Jun 2022 10:26) (18 - 18)  SpO2: 95% (21 Jun 2022 10:26) (95% - 98%)    Physical Exam:  Constitutional: NAD  HEENT: NC/AT, PERRL, EOMI, trachea midline, no JVD  Respiratory: CTA bilaterally, symmetrical chest rise  Cardiovascular: RRR, no m/g/r  Gastrointestinal: Soft, NT/ND, BS+  Vascular: 2+ peripheral pulses  Neurological: A/O x 3, no focal neurological deficits  Psych: Fair mood/affect  Musculoskeletal: No edema, cyanosis, deformities. ROM normal  Skin: No obvious rash, lesions. No jaundice.

## 2022-06-21 NOTE — DISCHARGE NOTE PROVIDER - NSDCCPCAREPLAN_GEN_ALL_CORE_FT
PRINCIPAL DISCHARGE DIAGNOSIS  Diagnosis: Acute on chronic systolic congestive heart failure  Assessment and Plan of Treatment: Please contiune the following medications:   Entresto 49-51mg twice a day  Farxiga 10mg daily  Toprol 50mg daily   Torsemide 30mg twice a day  Spironolactone 25mg daily   Please make a follow up appointment with your cardiology to further discuss cardiac MRI and lifevest.      SECONDARY DISCHARGE DIAGNOSES  Diagnosis: COLLEEN (acute kidney injury)  Assessment and Plan of Treatment: resolved

## 2022-06-21 NOTE — PROVIDER CONTACT NOTE (OTHER) - BACKGROUND
CHF patient with EF < 20% awaiting cardiac MRI/ life vest. Sinus tachycardia on tele with PVCs.
Patient normally runs normal sinus and sinus tach

## 2022-06-21 NOTE — DISCHARGE NOTE PROVIDER - NSDCMRMEDTOKEN_GEN_ALL_CORE_FT
aspirin 81 mg oral delayed release tablet: 1 tab(s) orally once a day  dapagliflozin 10 mg oral tablet: 1 tab(s) orally every 24 hours  Entresto 49 mg-51 mg oral tablet: 1 tab(s) orally 2 times a day  Lipitor 40 mg oral tablet: 1 tab(s) orally once a day  metoprolol succinate 50 mg oral tablet, extended release: 1 tab(s) orally once a day  Pepcid 20 mg oral tablet: 1 tab(s) orally 2 times a day  spironolactone 25 mg oral tablet: 1 tab(s) orally once a day  torsemide 10 mg oral tablet: 3 tab(s) orally 2 times a day

## 2022-06-21 NOTE — DISCHARGE NOTE PROVIDER - HOSPITAL COURSE
52 year old male with a history of CHF, HLD/HTN presented to [facility] ED c/o worsening SOB with associated orthopnea, PND, and testicular swelling. Of note, patient recently diagnosed with CHF at [facility] with LVEF of 15%, he underwent a cardiac cath which revealed no ischemic diseased. He was dc'd and awaiting to f/u with his cardiologist for a lifevest.  In the ED, he recieved Iv diuretics. Cardiology was consulted for HF management and he was admitted for acute on chronic CHF exacerbation. Cardiology started patient on Farxiga 10mg qd, spironolactone 25mg qd and increased his Entresto 49/51mg bid. Patient is continue with oral torsemide 30mg bid and Toprol 50mg qd. Patient to follow up outpatient for lifevest with his cardiologist.      52 year old male with a history of CHF, HLD/HTN presented to [facility] ED c/o worsening SOB with associated orthopnea, PND, and testicular swelling. Of note, patient recently diagnosed with CHF at [facility] with LVEF of 15%, he underwent a cardiac cath which revealed no ischemic diseased. He was dc'd and awaiting to f/u with his cardiologist for a lifevest.  In the ED, he recieved Iv diuretics. Cardiology was consulted for HF management and he was admitted for acute on chronic CHF exacerbation. Cardiology started patient on Farxiga 10mg qd, spironolactone 25mg qd and increased his Entresto 49/51mg bid. Patient is continue with oral torsemide 30mg bid and Toprol 50mg qd. Patient to follow up outpatient for LifeVest with his Cardiologist.    Patient okay for outpatient follow up.     Patient asymptomatic, wants to go home and no longer requires inpatient hospitalization and may follow up outpatient. Patient agreeable for d/c plan.

## 2022-06-21 NOTE — DISCHARGE NOTE NURSING/CASE MANAGEMENT/SOCIAL WORK - NSDCPEFALRISK_GEN_ALL_CORE
For information on Fall & Injury Prevention, visit: [email or web address] OR  [email or web address] OR  https://www.cdc.gov/steadi/patient.html

## 2022-06-21 NOTE — PROGRESS NOTE ADULT - ASSESSMENT
52 yr old M w/a PMH of HTN, HLD, CHF with EF 15% admitted with acute on chronic exacerbation of CHF.     Acute on chronic systolic congestive heart failure.   - Patient with recent admission to [facility].  As per patient Cath showed clean coronaries.  Patient self reports EF to be ~15%    - Toprol Xl 50 QD  - Entresto 24/26  - Repeat Echocardiogram confirms EF < 20%.    - Ins/ out- daily weight and Tele   - Cardiology, HF following  - On Lasix 60mg IV BID    COLLEEN, Hypernatremia, Hypokalemia   - Unclear what baseline kidney function is.    - Continue IV diuresis and monitor closely.    - Could be secondary due to underlying heart failure.  - Cr stable.  - Monitor BMP     Hypertension  - Continue Toprol    HLD  - Continue statin    DVT ppx
52 yr old M w/a PMH of HTN, HLD, CHF with EF 15% admitted with acute on chronic exacerbation of CHF.     Acute on chronic systolic congestive heart failure.   - Patient with recent admission to [facility].  As per patient Cath showed clean coronaries.  Patient self reports EF to be ~15%    - Repeat Echocardiogram confirms EF < 20%  - Monitor I&Os, daily weights, telemetry   - Cardiology, HF following  - On Lasix 60mg IV BID, Farxiga, Toprol 50, Entresto 24/26.  - Awaiting LifeVest.   - Awaiting cMRI.     COLLEEN, Hypernatremia, Hypokalemia   - Unclear what baseline kidney function is.    - Continue IV diuresis and monitor closely    - Could be secondary due to underlying heart failure  - Cr stable  - Monitor BMP     Hypertension  - Continue Toprol, Entresto, Aldactone    HLD  - Continue statin    DVT ppx: OOB    
52 yr old M w/a PMH of HTN, HLD, CHF with EF 15% admitted with acute on chronic exacerbation of CHF.     Acute on chronic systolic congestive heart failure.   - Patient with recent admission to [facility].  As per patient Cath showed clean coronaries.  Patient self reports EF to be ~15%    - Repeat Echocardiogram confirms EF < 20%  - Monitor I&Os, daily weights, telemetry   - Cardiology, HF following  - Switched Lasix IV to Torsemide 30mg BID. Continue with Farxiga, Toprol 50, Entresto 24/26.  - Awaiting LifeVest.   - Awaiting cMRI.     COLLEEN, Hypernatremia, Hypokalemia   - Unclear what baseline kidney function is.    - Continue IV diuresis and monitor closely    - Could be secondary due to underlying heart failure  - Cr stable  - Monitor BMP     Hypertension  - Continue Toprol, Entresto, Aldactone    HLD  - Continue statin    DVT ppx: OOB    
52 yr old M w/a PMH of HTN, HLD, CHF with EF 15% admitted with acute on chronic exacerbation of CHF.     Acute on chronic systolic congestive heart failure.   - Patient with recent admission to [facility].  As per patient Cath showed clean coronaries.  Patient self reports EF to be ~15%    - Repeat Echocardiogram confirms EF < 20%  - Monitor I&Os, daily weights, telemetry   - Cardiology, HF following  - On Lasix 60mg IV BID, Farxiga, Toprol 50, Entresto 24/26.  - Awaiting LifeVest.   - Awaiting cMRI.     COLLEEN, Hypernatremia, Hypokalemia   - Unclear what baseline kidney function is.    - Continue IV diuresis and monitor closely    - Could be secondary due to underlying heart failure  - Cr stable  - Monitor BMP     Hypertension  - Continue Toprol, Entresto, Aldactone    HLD  - Continue statin    DVT ppx: OOB    
52 yr old M w/a PMH of HTN, HLD, CHF with EF 15% admitted with acute on chronic exacerbation of CHF.     Problem/Plan - 1:  ·  Problem: Acute on chronic systolic congestive heart failure.   ·  Plan: Patient with recent admission to [facility].  As per patient Cath showed clean coronaries.  Patient self reports EF to be ~15%  -Continue IV Lasix 80 BID   -Toprol Xl 50 QD  -Entresto 24/26  -Repeat Echocardiogram   -Ins/ out- daily weight and Tele   -[facility] cardiology consulted.    Problem/Plan - 2:  ·  Problem: COLLEEN (acute kidney injury).   ·  Plan: Unclear what baseline kidney function is.    Continue IV diuresis and monitor closely.    Could be secondary due to underlying heart failure.  Cr stable    Problem/Plan - 3:  ·  Problem: Hypertension.   ·  Plan: Continue Toprol.  If blood pressure needs better control would increase Entresto dosage.    Problem/Plan - 4:  ·  Problem: HLD (hyperlipidemia).   ·  Plan: Continue atorvastatin 40.    Problem/Plan - 5:  ·  Problem: Discharge planning issues.   ·  Plan: Independent  
52 yr old M w/a PMH of HTN, HLD, CHF with EF 15% admitted with acute on chronic exacerbation of CHF.     Problem/Plan - 1:  ·  Problem: Acute on chronic systolic congestive heart failure.   ·  Plan: Patient with recent admission to [facility].  As per patient Cath showed clean coronaries.  Patient self reports EF to be ~15%  -Continue IV Lasix    -Toprol Xl 50 QD  -Entresto 24/26  -Repeat Echocardiogram confirms EF < 20%.    -Ins/ out- daily weight and Tele   -[place] cardiology consulted - heart failure consult - final consult pending.   -Discussed possible role for AICD - risk of sudden cardiac death discussed. .     Problem/Plan - 2:  ·  Problem: COLLEEN (acute kidney injury)? / Hypernatremia, Hypokalemia   ·  Plan: Unclear what baseline kidney function is.    Continue IV diuresis and monitor closely.    Could be secondary due to underlying heart failure.  Cr stable.  Decrease Lasix to 60mg BID    Problem/Plan - 3:  ·  Problem: Hypertension.   ·  Plan: Continue Toprol.  If blood pressure needs better control would increase Entresto dosage.    Problem/Plan - 4:  ·  Problem: HLD (hyperlipidemia).   ·  Plan: Continue atorvastatin 40.    Problem/Plan - 5:  ·  Problem: Discharge planning issues.   ·  Plan: Independent  
53 y/o male with newly diagnosed systolic heart failure, NICM (LVEF < 20%, LVIDd 6.85), HTN, HLD and COLLEEN who presented to [facility] 6/15 with c/o weight gain, significant LE edema and testicular swelling.     He refers he was feeling well and without limitations up until about 1.5 to 2 months ago when he began experiencing acid reflux symptoms/cough. He initially presented to urgent care and was prescribed oral steroids, AB and an inhaler. Within 24hrs of taking steroids he had significant worsening of symptoms including edema which prompted him to [facility]. At that time, he was found to have low EF on echo. LHC revealed NO obstructive CAD. He tells me he signed out AMA due to feeling very confused and overwhelmed. He ultimately returned back with recurrent HF symptoms. He was discharged home approx 2-3 weeks ago.     His recent recurrent hospital admissions are concerning. Will plan for aggressive IV diuresis and GDMT optimization. CMR ordered.    Cardiologist: Janes Etienne MD ([facility])    Cardiac Testing:  TTE 6/16/22: LVEF <20%, LVIDd 6.85cm, moderate RVE with moderately reduced RV systolic function, severe biatrial enlargement, moderate to sever MR, moderate TR, PASP 38.2mmHg (RAP 8).

## 2022-07-13 DIAGNOSIS — Z83.3 FAMILY HISTORY OF DIABETES MELLITUS: ICD-10-CM

## 2022-07-13 DIAGNOSIS — Z78.9 OTHER SPECIFIED HEALTH STATUS: ICD-10-CM

## 2022-07-13 DIAGNOSIS — I50.23 ACUTE ON CHRONIC SYSTOLIC (CONGESTIVE) HEART FAILURE: ICD-10-CM

## 2022-07-13 DIAGNOSIS — Z82.3 FAMILY HISTORY OF STROKE: ICD-10-CM

## 2022-07-13 DIAGNOSIS — E78.5 HYPERLIPIDEMIA, UNSPECIFIED: ICD-10-CM

## 2022-07-13 DIAGNOSIS — N17.9 ACUTE KIDNEY FAILURE, UNSPECIFIED: ICD-10-CM

## 2022-07-13 PROBLEM — I50.9 HEART FAILURE, UNSPECIFIED: Chronic | Status: ACTIVE | Noted: 2022-06-15

## 2022-07-13 PROBLEM — I10 ESSENTIAL (PRIMARY) HYPERTENSION: Chronic | Status: ACTIVE | Noted: 2022-06-15

## 2022-07-13 PROBLEM — Z00.00 ENCOUNTER FOR PREVENTIVE HEALTH EXAMINATION: Status: ACTIVE | Noted: 2022-07-13

## 2022-07-20 ENCOUNTER — APPOINTMENT (OUTPATIENT)
Dept: HEART FAILURE | Facility: CLINIC | Age: 52
End: 2022-07-20

## 2022-07-20 NOTE — REASON FOR VISIT
[FreeTextEntry1] : Primary Cardiologist: Janes Etienne MD ([facility])\par HF Cardiologist: Jessica Ashford MD

## 2022-07-20 NOTE — HISTORY OF PRESENT ILLNESS
[FreeTextEntry1] : 51 y/o male with newly diagnosed systolic heart failure, NICM (LVEF < 20%, LVIDd 6.85), HTN, HLD and COLLEEN who presents for hospital follow-up.\par \par We met recently while admitted to [facility] 6/15 with c/o weight gain, significant LE edema and testicular swelling. \par He refers he was feeling well and without limitations up until about 1.5 to 2 months ago when he began experiencing acid reflux symptoms/cough. He initially presented to urgent care and was prescribed oral steroids, AB and an inhaler. Within 24hrs of taking steroids he had significant worsening of symptoms including edema which prompted him to [facility]. At that time, he was found to have low EF on echo. \par LHC revealed NO obstructive CAD. He tells me he signed out AMA due to feeling very confused and overwhelmed. He ultimately returned back with recurrent HF symptoms, was discharged home for ~2-3 weeks before this most recent admission.\par He was aggressively diuresed, GDMT was optimized and he was discharged home on Toprol 50 daily, Entresto 49-51BID, Spironolactone 25mg daily. Farxiga 10mg daily. \par \par \par \par

## 2022-07-20 NOTE — DISCUSSION/SUMMARY
[Patient] : the patient [Risks] : risks [Benefits] : benefits [FreeTextEntry1] : His recent recurrent hospital admissions are concerning. Will plan for aggressive IV diuresis and GDMT optimization. CMR ordered.

## 2022-07-20 NOTE — ASSESSMENT
[FreeTextEntry1] : Systolic heart failure \par Dilated, non-ischemic cardiomyopathy (LVEF <20%, LIVDd 6.8cm) w/ RV dysfunction\par Cardiac MRI ordered\par Clinically he apppears euvolemic, warm extremities\par Diuretics: Torsemide 30mg BID\par GDMT: Continue Toprol-XL 50mg daily, Entresto 49-51mg BID, Aldactone 25mg daily and Farxiga 10mg daily.\par Prescription provided for repeat labs to closely monitor renal function/electrolytes\par Device: Will plan to reassess EF after 3mo of maximally tolerated GDMT. Gen cards recommended LifeVest.\par Cardiac rehab referral\par \par COLLEEN\par Improved w/ diuresis\par \par MR\par Can reassess once on optimal GDMT \par \par HTN\par Meds as above\par

## 2022-07-20 NOTE — CARDIOLOGY SUMMARY
[de-identified] : \par TTE 6/16/22: LVEF <20%, LVIDd 6.85cm, moderate RVE with moderately reduced RV systolic function, severe biatrial enlargement, moderate to sever MR, moderate TR, PASP 38.2mmHg (RAP 8).

## 2022-08-02 ENCOUNTER — APPOINTMENT (OUTPATIENT)
Dept: HEART FAILURE | Facility: CLINIC | Age: 52
End: 2022-08-02

## 2022-10-12 ENCOUNTER — APPOINTMENT (OUTPATIENT)
Dept: HEART FAILURE | Facility: CLINIC | Age: 52
End: 2022-10-12

## 2022-10-12 NOTE — HISTORY OF PRESENT ILLNESS
[FreeTextEntry1] : 53 y/o male with newly diagnosed systolic heart failure, NICM (LVEF < 20%, LVIDd 6.85), HTN, HLD and COLLEEN who presented to [facility] 6/15 with c/o weight gain, significant LE edema and testicular swelling. \par \par He refers he was feeling well and without limitations up until about 1.5 to 2 months ago when he began experiencing acid reflux symptoms/cough. He initially presented to urgent care and was prescribed oral steroids, AB and an inhaler. Within 24hrs of taking steroids he had significant worsening of symptoms including edema which prompted him to [facility]. At that time, he was found to have low EF on echo. LHC revealed NO obstructive CAD. He tells me he signed out AMA due to feeling very confused and overwhelmed. He ultimately returned back with recurrent HF symptoms. He was discharged home approx 2-3 weeks ago. \par \par His recent recurrent hospital admissions are concerning. Will plan for aggressive IV diuresis and GDMT optimization. CMR ordered.\par \par Cardiologist: Janes Etienne MD ([facility])\par

## 2022-10-12 NOTE — PHYSICAL EXAM
[Well Developed] : well developed [Well Nourished] : well nourished [No Acute Distress] : no acute distress [Normal Conjunctiva] : normal conjunctiva [Normal Venous Pressure] : normal venous pressure [No Carotid Bruit] : no carotid bruit [Normal S1, S2] : normal S1, S2 [No Murmur] : no murmur [Clear Lung Fields] : clear lung fields [Good Air Entry] : good air entry [No Respiratory Distress] : no respiratory distress  [Soft] : abdomen soft [Non Tender] : non-tender [No Masses/organomegaly] : no masses/organomegaly [Normal Gait] : normal gait [No Edema] : no edema [No Cyanosis] : no cyanosis [No Rash] : no rash [No Skin Lesions] : no skin lesions [Moves all extremities] : moves all extremities

## 2022-10-12 NOTE — CARDIOLOGY SUMMARY
[de-identified] : 6/16/22: LVEF <20%, LVIDd 6.85cm, moderate RVE with moderately reduced RV systolic function, severe biatrial enlargement, moderate to sever MR, moderate TR, PASP 38.2mmHg (RAP 8).\par  [de-identified] : LHC ([facility]) non obstructive CAD

## 2023-09-07 ENCOUNTER — RX RENEWAL (OUTPATIENT)
Age: 53
End: 2023-09-07

## 2023-09-27 ENCOUNTER — APPOINTMENT (OUTPATIENT)
Dept: HEART FAILURE | Facility: CLINIC | Age: 53
End: 2023-09-27
Payer: SELF-PAY

## 2023-09-27 VITALS — DIASTOLIC BLOOD PRESSURE: 122 MMHG | SYSTOLIC BLOOD PRESSURE: 152 MMHG

## 2023-09-27 VITALS
HEIGHT: 70.5 IN | BODY MASS INDEX: 30.86 KG/M2 | DIASTOLIC BLOOD PRESSURE: 118 MMHG | HEART RATE: 108 BPM | SYSTOLIC BLOOD PRESSURE: 160 MMHG | WEIGHT: 218 LBS | OXYGEN SATURATION: 99 %

## 2023-09-27 PROCEDURE — 99214 OFFICE O/P EST MOD 30 MIN: CPT

## 2023-09-27 PROCEDURE — 93000 ELECTROCARDIOGRAM COMPLETE: CPT | Mod: NC

## 2023-09-27 RX ORDER — ATORVASTATIN CALCIUM 40 MG/1
40 TABLET, FILM COATED ORAL
Qty: 30 | Refills: 5 | Status: ACTIVE | COMMUNITY
Start: 2022-07-13 | End: 1900-01-01

## 2023-09-27 RX ORDER — ASPIRIN ENTERIC COATED TABLETS 81 MG 81 MG/1
81 TABLET, DELAYED RELEASE ORAL
Qty: 30 | Refills: 5 | Status: ACTIVE | COMMUNITY
Start: 2022-07-13 | End: 1900-01-01

## 2023-09-27 RX ORDER — FAMOTIDINE 20 MG/1
20 TABLET, FILM COATED ORAL DAILY
Qty: 30 | Refills: 5 | Status: ACTIVE | COMMUNITY
Start: 2022-07-13 | End: 1900-01-01

## 2023-09-27 RX ORDER — DAPAGLIFLOZIN 10 MG/1
10 TABLET, FILM COATED ORAL
Qty: 30 | Refills: 5 | Status: ACTIVE | COMMUNITY
Start: 2022-07-13 | End: 1900-01-01

## 2023-09-27 RX ORDER — SPIRONOLACTONE 25 MG/1
25 TABLET ORAL DAILY
Qty: 30 | Refills: 0 | Status: DISCONTINUED | COMMUNITY
Start: 2022-07-13 | End: 2023-09-27

## 2023-10-01 ENCOUNTER — INPATIENT (INPATIENT)
Facility: HOSPITAL | Age: 53
LOS: 2 days | Discharge: HOME | DRG: 65 | End: 2023-10-04
Attending: STUDENT IN AN ORGANIZED HEALTH CARE EDUCATION/TRAINING PROGRAM | Admitting: INTERNAL MEDICINE
Payer: SELF-PAY

## 2023-10-01 ENCOUNTER — TRANSCRIPTION ENCOUNTER (OUTPATIENT)
Age: 53
End: 2023-10-01

## 2023-10-01 VITALS
WEIGHT: 209 LBS | OXYGEN SATURATION: 98 % | RESPIRATION RATE: 18 BRPM | HEART RATE: 122 BPM | TEMPERATURE: 98 F | HEIGHT: 70 IN | SYSTOLIC BLOOD PRESSURE: 179 MMHG | DIASTOLIC BLOOD PRESSURE: 114 MMHG

## 2023-10-01 DIAGNOSIS — I63.9 CEREBRAL INFARCTION, UNSPECIFIED: ICD-10-CM

## 2023-10-01 DIAGNOSIS — Z98.890 OTHER SPECIFIED POSTPROCEDURAL STATES: Chronic | ICD-10-CM

## 2023-10-01 LAB
A1C WITH ESTIMATED AVERAGE GLUCOSE RESULT: 5.5 % — SIGNIFICANT CHANGE UP (ref 4–5.6)
ALBUMIN SERPL ELPH-MCNC: 3.1 G/DL — LOW (ref 3.3–5.2)
ALP SERPL-CCNC: 76 U/L — SIGNIFICANT CHANGE UP (ref 40–120)
ALT FLD-CCNC: 30 U/L — SIGNIFICANT CHANGE UP
ANION GAP SERPL CALC-SCNC: 13 MMOL/L — SIGNIFICANT CHANGE UP (ref 5–17)
APTT BLD: 30.7 SEC — SIGNIFICANT CHANGE UP (ref 24.5–35.6)
AST SERPL-CCNC: 26 U/L — SIGNIFICANT CHANGE UP
BASOPHILS # BLD AUTO: 0.05 K/UL — SIGNIFICANT CHANGE UP (ref 0–0.2)
BASOPHILS NFR BLD AUTO: 0.7 % — SIGNIFICANT CHANGE UP (ref 0–2)
BILIRUB SERPL-MCNC: 0.4 MG/DL — SIGNIFICANT CHANGE UP (ref 0.4–2)
BUN SERPL-MCNC: 14.9 MG/DL — SIGNIFICANT CHANGE UP (ref 8–20)
CALCIUM SERPL-MCNC: 8.1 MG/DL — LOW (ref 8.4–10.5)
CHLORIDE SERPL-SCNC: 103 MMOL/L — SIGNIFICANT CHANGE UP (ref 96–108)
CHOLEST SERPL-MCNC: 127 MG/DL — SIGNIFICANT CHANGE UP
CO2 SERPL-SCNC: 23 MMOL/L — SIGNIFICANT CHANGE UP (ref 22–29)
CREAT SERPL-MCNC: 1.33 MG/DL — HIGH (ref 0.5–1.3)
EGFR: 64 ML/MIN/1.73M2 — SIGNIFICANT CHANGE UP
EOSINOPHIL # BLD AUTO: 0.35 K/UL — SIGNIFICANT CHANGE UP (ref 0–0.5)
EOSINOPHIL NFR BLD AUTO: 5 % — SIGNIFICANT CHANGE UP (ref 0–6)
ESTIMATED AVERAGE GLUCOSE: 111 MG/DL — SIGNIFICANT CHANGE UP (ref 68–114)
ETHANOL SERPL-MCNC: <10 MG/DL — SIGNIFICANT CHANGE UP (ref 0–9)
GLUCOSE SERPL-MCNC: 99 MG/DL — SIGNIFICANT CHANGE UP (ref 70–99)
HCT VFR BLD CALC: 45.2 % — SIGNIFICANT CHANGE UP (ref 39–50)
HDLC SERPL-MCNC: 38 MG/DL — LOW
HGB BLD-MCNC: 15 G/DL — SIGNIFICANT CHANGE UP (ref 13–17)
IMM GRANULOCYTES NFR BLD AUTO: 0.3 % — SIGNIFICANT CHANGE UP (ref 0–0.9)
INR BLD: 1.19 RATIO — HIGH (ref 0.85–1.18)
LIPID PNL WITH DIRECT LDL SERPL: 80 MG/DL — SIGNIFICANT CHANGE UP
LYMPHOCYTES # BLD AUTO: 2.13 K/UL — SIGNIFICANT CHANGE UP (ref 1–3.3)
LYMPHOCYTES # BLD AUTO: 30.1 % — SIGNIFICANT CHANGE UP (ref 13–44)
MANUAL SMEAR VERIFICATION: SIGNIFICANT CHANGE UP
MCHC RBC-ENTMCNC: 24.4 PG — LOW (ref 27–34)
MCHC RBC-ENTMCNC: 33.2 GM/DL — SIGNIFICANT CHANGE UP (ref 32–36)
MCV RBC AUTO: 73.5 FL — LOW (ref 80–100)
MONOCYTES # BLD AUTO: 0.7 K/UL — SIGNIFICANT CHANGE UP (ref 0–0.9)
MONOCYTES NFR BLD AUTO: 9.9 % — SIGNIFICANT CHANGE UP (ref 2–14)
NEUTROPHILS # BLD AUTO: 3.82 K/UL — SIGNIFICANT CHANGE UP (ref 1.8–7.4)
NEUTROPHILS NFR BLD AUTO: 54 % — SIGNIFICANT CHANGE UP (ref 43–77)
NON HDL CHOLESTEROL: 89 MG/DL — SIGNIFICANT CHANGE UP
OVALOCYTES BLD QL SMEAR: SLIGHT — SIGNIFICANT CHANGE UP
PLAT MORPH BLD: NORMAL — SIGNIFICANT CHANGE UP
PLATELET # BLD AUTO: 371 K/UL — SIGNIFICANT CHANGE UP (ref 150–400)
POIKILOCYTOSIS BLD QL AUTO: SLIGHT — SIGNIFICANT CHANGE UP
POLYCHROMASIA BLD QL SMEAR: SLIGHT — SIGNIFICANT CHANGE UP
POTASSIUM SERPL-MCNC: 3.7 MMOL/L — SIGNIFICANT CHANGE UP (ref 3.5–5.3)
POTASSIUM SERPL-SCNC: 3.7 MMOL/L — SIGNIFICANT CHANGE UP (ref 3.5–5.3)
PROT SERPL-MCNC: 6.7 G/DL — SIGNIFICANT CHANGE UP (ref 6.6–8.7)
PROTHROM AB SERPL-ACNC: 13.1 SEC — HIGH (ref 9.5–13)
RBC # BLD: 6.15 M/UL — HIGH (ref 4.2–5.8)
RBC # FLD: 16.2 % — HIGH (ref 10.3–14.5)
RBC BLD AUTO: SIGNIFICANT CHANGE UP
SODIUM SERPL-SCNC: 138 MMOL/L — SIGNIFICANT CHANGE UP (ref 135–145)
TRIGL SERPL-MCNC: 43 MG/DL — SIGNIFICANT CHANGE UP
TROPONIN T SERPL-MCNC: <0.01 NG/ML — SIGNIFICANT CHANGE UP (ref 0–0.06)
WBC # BLD: 7.07 K/UL — SIGNIFICANT CHANGE UP (ref 3.8–10.5)
WBC # FLD AUTO: 7.07 K/UL — SIGNIFICANT CHANGE UP (ref 3.8–10.5)

## 2023-10-01 PROCEDURE — 70498 CT ANGIOGRAPHY NECK: CPT | Mod: 26,MA

## 2023-10-01 PROCEDURE — 70450 CT HEAD/BRAIN W/O DYE: CPT | Mod: 26,MA

## 2023-10-01 PROCEDURE — 99223 1ST HOSP IP/OBS HIGH 75: CPT

## 2023-10-01 PROCEDURE — 99053 MED SERV 10PM-8AM 24 HR FAC: CPT

## 2023-10-01 PROCEDURE — 93010 ELECTROCARDIOGRAM REPORT: CPT

## 2023-10-01 PROCEDURE — 70496 CT ANGIOGRAPHY HEAD: CPT | Mod: 26,MA

## 2023-10-01 PROCEDURE — 71045 X-RAY EXAM CHEST 1 VIEW: CPT | Mod: 26

## 2023-10-01 PROCEDURE — 99291 CRITICAL CARE FIRST HOUR: CPT

## 2023-10-01 PROCEDURE — 0042T: CPT | Mod: MA

## 2023-10-01 RX ORDER — ACETAMINOPHEN 500 MG
650 TABLET ORAL EVERY 6 HOURS
Refills: 0 | Status: DISCONTINUED | OUTPATIENT
Start: 2023-10-01 | End: 2023-10-04

## 2023-10-01 RX ORDER — ONDANSETRON 8 MG/1
4 TABLET, FILM COATED ORAL EVERY 8 HOURS
Refills: 0 | Status: DISCONTINUED | OUTPATIENT
Start: 2023-10-01 | End: 2023-10-04

## 2023-10-01 RX ORDER — ASPIRIN/CALCIUM CARB/MAGNESIUM 324 MG
162 TABLET ORAL ONCE
Refills: 0 | Status: COMPLETED | OUTPATIENT
Start: 2023-10-01 | End: 2023-10-01

## 2023-10-01 RX ORDER — ATORVASTATIN CALCIUM 80 MG/1
40 TABLET, FILM COATED ORAL AT BEDTIME
Refills: 0 | Status: DISCONTINUED | OUTPATIENT
Start: 2023-10-01 | End: 2023-10-03

## 2023-10-01 RX ORDER — FAMOTIDINE 10 MG/ML
20 INJECTION INTRAVENOUS
Refills: 0 | Status: DISCONTINUED | OUTPATIENT
Start: 2023-10-01 | End: 2023-10-04

## 2023-10-01 RX ORDER — DAPAGLIFLOZIN 10 MG/1
10 TABLET, FILM COATED ORAL EVERY 24 HOURS
Refills: 0 | Status: DISCONTINUED | OUTPATIENT
Start: 2023-10-01 | End: 2023-10-04

## 2023-10-01 RX ORDER — ASPIRIN/CALCIUM CARB/MAGNESIUM 324 MG
81 TABLET ORAL DAILY
Refills: 0 | Status: DISCONTINUED | OUTPATIENT
Start: 2023-10-01 | End: 2023-10-01

## 2023-10-01 RX ORDER — LANOLIN ALCOHOL/MO/W.PET/CERES
3 CREAM (GRAM) TOPICAL AT BEDTIME
Refills: 0 | Status: DISCONTINUED | OUTPATIENT
Start: 2023-10-01 | End: 2023-10-04

## 2023-10-01 RX ORDER — ENOXAPARIN SODIUM 100 MG/ML
40 INJECTION SUBCUTANEOUS EVERY 24 HOURS
Refills: 0 | Status: DISCONTINUED | OUTPATIENT
Start: 2023-10-01 | End: 2023-10-04

## 2023-10-01 RX ORDER — ASPIRIN/CALCIUM CARB/MAGNESIUM 324 MG
81 TABLET ORAL DAILY
Refills: 0 | Status: DISCONTINUED | OUTPATIENT
Start: 2023-10-01 | End: 2023-10-04

## 2023-10-01 RX ORDER — SACUBITRIL AND VALSARTAN 24; 26 MG/1; MG/1
1 TABLET, FILM COATED ORAL
Refills: 0 | DISCHARGE

## 2023-10-01 RX ORDER — LABETALOL HCL 100 MG
5 TABLET ORAL ONCE
Refills: 0 | Status: COMPLETED | OUTPATIENT
Start: 2023-10-01 | End: 2023-10-01

## 2023-10-01 RX ADMIN — ENOXAPARIN SODIUM 40 MILLIGRAM(S): 100 INJECTION SUBCUTANEOUS at 11:40

## 2023-10-01 RX ADMIN — Medication 81 MILLIGRAM(S): at 11:43

## 2023-10-01 RX ADMIN — ATORVASTATIN CALCIUM 40 MILLIGRAM(S): 80 TABLET, FILM COATED ORAL at 22:31

## 2023-10-01 RX ADMIN — FAMOTIDINE 20 MILLIGRAM(S): 10 INJECTION INTRAVENOUS at 17:09

## 2023-10-01 RX ADMIN — DAPAGLIFLOZIN 10 MILLIGRAM(S): 10 TABLET, FILM COATED ORAL at 12:46

## 2023-10-01 RX ADMIN — Medication 162 MILLIGRAM(S): at 03:39

## 2023-10-01 RX ADMIN — FAMOTIDINE 20 MILLIGRAM(S): 10 INJECTION INTRAVENOUS at 06:07

## 2023-10-01 RX ADMIN — Medication 5 MILLIGRAM(S): at 03:38

## 2023-10-01 NOTE — H&P ADULT - HISTORY OF PRESENT ILLNESS
52 y/o male with PMH of HTN, CHFrEF, GERD, HLD came to the ED complaining of slurred speech. Symptom started around 8PM while he was eating dinner with his significant other; it happened for a few second but happened again; 4 episodes in total. He has no blurry vision, dizziness, HA, weakness, numbness/tingling, chest pain, palpitation, abdominal pain, change in bowel/urinary habit, fever, chills, recent travel, sick contact.

## 2023-10-01 NOTE — ED PROVIDER NOTE - PROGRESS NOTE DETAILS
Danette Saenz, DO: Patient resting comfortably, no acute distress. Reports ~4 episodes of expressive aphasia tonight. Now resolved. NIHSS 0.     CT imaging demonstrating "Focal hypoattenuation in the region of the left frontal operculum, which may reflect acute or subacute infarct"; reviewed with patient. Plan of care discussed with patient, agrees. Questions answered. Case discussed with hospitalist. Admit to tele.

## 2023-10-01 NOTE — ED PROVIDER NOTE - NIH STROKE SCALE: 7. LIMB ATAXIA, QM
Health Maintenance Due   Topic Date Due   • COVID-19 Vaccine (1) Never done   • Varicella Vaccine (2 of 2 - 2-dose childhood series) 10/17/1998   • DTaP/Tdap/Td Vaccine (5 - Tdap) 10/17/2005   • Influenza Vaccine (1) 09/01/2022       Patient is due for topics as listed above but is not proceeding with Immunization(s) COVID-19, Dtap/Tdap/Td, Influenza and Varicella at this time.      (0) Absent

## 2023-10-01 NOTE — CONSULT NOTE ADULT - NS ATTEND AMEND GEN_ALL_CORE FT
[facility]                                        Ne[facility] [place]                                 Riddhi Hart, & Kalen                                  [address]                                        [place], [place], [address]                                             [phone]          Addendum:  53 year old man with several episodes of transient speech difficulty. Both expressive difficulty and dysarthria.   CT images reviewed. Focal hypoattenuation in the region of the left frontal operculum.  CT-A negative for large vessel occlusion.  Plan   - MRI brain.   - Continue antiplatelet and statin.   - Check echocardiogram.  PT/OT.     Case discussed with Dr Torres.

## 2023-10-01 NOTE — DISCHARGE NOTE PROVIDER - NSDCFUSCHEDAPPT_GEN_ALL_CORE_FT
Natalie Talamantes  [facility] Physician [address]  Scheduled Appointment: 10/25/2023

## 2023-10-01 NOTE — DISCHARGE NOTE PROVIDER - NSDCCPCAREPLAN_GEN_ALL_CORE_FT
PRINCIPAL DISCHARGE DIAGNOSIS  Diagnosis: TIA (transient ischemic attack)  Assessment and Plan of Treatment:       SECONDARY DISCHARGE DIAGNOSES  Diagnosis: Mild HTN  Assessment and Plan of Treatment:     Diagnosis: Chronic systolic congestive heart failure  Assessment and Plan of Treatment:      PRINCIPAL DISCHARGE DIAGNOSIS  Diagnosis: Acute ischemic stroke  Assessment and Plan of Treatment: You were found to have an acute ischemic left insular infarction on CT and MRI brain.  Please start plavix and take for 21 days as per neurology recommendations.  Follow up with neurology and cardiology out patient for continued monitoring.          SECONDARY DISCHARGE DIAGNOSES  Diagnosis: Chronic systolic congestive heart failure  Assessment and Plan of Treatment: Continue with prescribed medications and follow up with cardiology in 1-2 weeks of discharge.     PRINCIPAL DISCHARGE DIAGNOSIS  Diagnosis: Acute ischemic stroke  Assessment and Plan of Treatment: You were found to have an acute ischemic left insular infarction on CT and MRI brain.  Please start plavix and take for 21 days as per neurology recommendations.  Follow up with neurology and cardiology out patient for continued monitoring.          SECONDARY DISCHARGE DIAGNOSES  Diagnosis: Chronic systolic congestive heart failure  Assessment and Plan of Treatment: Continue with prescribed medications and follow up with cardiology in 2 weeks of discharge.  MCOT placed to monitor for arrhythmias.  Follow up with EP out patient for ICD placement.

## 2023-10-01 NOTE — ED PROVIDER NOTE - CLINICAL SUMMARY MEDICAL DECISION MAKING FREE TEXT BOX
54yo male with pmh of HTN, CHF, GERD, HLD presents with slurred speech. Code stroke called on arrival pt outside thrombolytic time window.

## 2023-10-01 NOTE — ED ADULT NURSE NOTE - OBJECTIVE STATEMENT
Assumed care of pt at 0200 in CC. Pt A&Ox4 c/o slurred speech that started around 8pm, the pt states that he has a history of hypertension, the pt states that he was out to eat when he felt like he "locked up mentally" and [id number] he wasn't sounding himself, code stroke called at 0120 and pt brought to CT scan, pt on cardiac monitor in sinus tach, pt denies N/V/D/CP/SOB, pt resting comfortably showing no signs of respiratory distress or pain, the pt is calm and cooperative

## 2023-10-01 NOTE — DISCHARGE NOTE PROVIDER - NSDCMRMEDTOKEN_GEN_ALL_CORE_FT
aspirin 81 mg oral delayed release tablet: 1 tab(s) orally once a day  dapagliflozin 10 mg oral tablet: 1 tab(s) orally every 24 hours  Entresto 49 mg-51 mg oral tablet: 1 tab(s) orally 2 times a day  Lipitor 40 mg oral tablet: 1 tab(s) orally once a day  metoprolol succinate 50 mg oral tablet, extended release: 1 tab(s) orally once a day  Pepcid 20 mg oral tablet: 1 tab(s) orally 2 times a day  torsemide 20 mg oral tablet: 1 tab(s) orally 2 times a day   aspirin 81 mg oral delayed release tablet: 1 tab(s) orally once a day  clopidogrel 75 mg oral tablet: 1 tab(s) orally once a day  dapagliflozin 10 mg oral tablet: 1 tab(s) orally every 24 hours  Entresto 49 mg-51 mg oral tablet: 1 tab(s) orally 2 times a day  Lipitor 40 mg oral tablet: 1 tab(s) orally once a day  metoprolol succinate 50 mg oral tablet, extended release: 1 tab(s) orally once a day  Pepcid 20 mg oral tablet: 1 tab(s) orally 2 times a day  torsemide 20 mg oral tablet: 1 tab(s) orally 2 times a day

## 2023-10-01 NOTE — ED ADULT NURSE NOTE - NSSEPSISSUSPECTED_ED_A_ED
Child's Well Visit, 6 Months: Care Instructions  Your Care Instructions    Your baby's bond with you and other caregivers will be very strong by now. He or she may be shy around strangers and may hold on to familiar people. It is normal for a baby to feel safer to crawl and explore with people he or she knows. At six months, your baby may use his or her voice to make new sounds or playful screams. He or she may sit with support. Your baby may begin to feed himself or herself. Your baby may start to scoot or crawl when lying on his or her tummy. Follow-up care is a key part of your child's treatment and safety. Be sure to make and go to all appointments, and call your doctor if your child is having problems. It's also a good idea to know your child's test results and keep a list of the medicines your child takes. How can you care for your child at home? Feeding  · Keep breastfeeding for at least 12 months to prevent colds and ear infections. · If you do not breastfeed, give your baby a formula with iron. · Use a spoon to feed your baby plain baby foods at 2 or 3 meals a day. · When you offer a new food to your baby, wait 2 to 3 days in between each new food. Watch for a rash, diarrhea, breathing problems, or gas. These may be signs of a food or milk allergy. · Let your baby decide how much to eat. · Do not give your baby honey in the first year of life. Honey can make your baby sick. · Offer water when your child is thirsty. Juice does not have the valuable fiber that whole fruit has. Do not give your baby soda pop, juice, fast food, or sweets. Safety  · Put your baby to sleep on his or her back, not on the side or tummy. This reduces the risk of SIDS. Use a firm, flat mattress. Do not put pillows in the crib. Do not use sleep positioners or crib bumpers. · Use a car seat for every ride. Install it properly in the back seat facing backward.  If you have questions about car seats, call the [facility] [address] [place] [address] at [phone]. · Tell your doctor if your child spends a lot of time in a house built before 1978. The paint may have lead in it, which can be harmful. · Keep the number for Poison Control (2-862.970.6222) in or near your phone. · Do not use walkers, which can easily tip over and lead to serious injury. · Avoid burns. Turn water temperature down, and always check it before baths. Do not drink or hold hot liquids near your baby. Immunizations  · Most babies get a dose of important vaccines at their 6-month checkup. Make sure that your baby gets the recommended childhood vaccines for illnesses, such as whooping cough and diphtheria. These vaccines will help keep your baby healthy and prevent the spread of disease. Your baby needs all doses to be protected. When should you call for help? Watch closely for changes in your child's health, and be sure to contact your doctor if:    · You are concerned that your child is not growing or developing normally.     · You are worried about your child's behavior.     · You need more information about how to care for your child, or you have questions or concerns. Where can you learn more? Go to http://ron-iliana.info/. Enter T104 in the search box to learn more about \"Child's Well Visit, 6 Months: Care Instructions. \"  Current as of: March 27, 2018  Content Version: 11.9  © 7358-2697 [employer]. Care instructions adapted under license by [employer] (which disclaims liability or warranty for this information). If you have questions about a medical condition or this instruction, always ask your healthcare professional. [address] any warranty or liability for your use of this information. No

## 2023-10-01 NOTE — H&P ADULT - ASSESSMENT
54 y/o male with PMH of HTN, CHFrEF, GERD, HLD came to the ED complaining of slurred speech. Symptom started around 8PM while he was eating dinner with his significant other; it happened for a few second but happened again; 4 episodes in total. In the ED, CT head: acute or subacute infarct left frontal operculum.     Slurred speech due to acute CVA   Admit to telemetry   CT head as noted above   MRI and echo order   Lipid panel, HbA1C with AM lab   Aspirin 162mg once given in the ED, will continue 81mg (home dose)   Atorvastatin 40mg   Neurology consulted   Neuro checks q4h     Hx of CHFrEF/HTN   Last echo (06/16/22) EF: <20  On Entresto 49-51mg bid   Torsemide 20mg bid  Metoprolol ER 50mg   Will hold BP medications for permissive HTN given acute CVA   Resume BP medication as needed   Farxiga 10mg     CKD-3   Stable   Monitor renal function     GERD   Famotidine 20mg bid     Supportive   DVT prophylaxis: CSD for now, change to chemical as needed   Diet: radha (passed dysphagia screening)     Plan of care discussed with patient and significant other at bed side.

## 2023-10-01 NOTE — ED PROVIDER NOTE - PHYSICAL EXAMINATION
Const: Awake, alert and oriented. In no acute distress. Well appearing.  HEENT: NC/AT. Moist mucous membranes.  Eyes: No scleral icterus. EOMI.  Neck:. Soft and supple. Full ROM without pain.  Cardiac: Regular rate and regular rhythm. +S1/S2. Peripheral pulses 2+ and symmetric. No LE edema.  Resp: Speaking in full sentences. No evidence of respiratory distress. No wheezes, rales or rhonchi.  Abd: Soft, non-tender, non-distended. Normal bowel sounds in all 4 quadrants. No guarding or rebound.  Back: Spine midline and non-tender. No CVAT.  Skin: No rashes, abrasions or lacerations.  Lymph: No cervical lymphadenopathy.  Neuro: A&Ox3, moving all extremities symmetrically, follows commands, motor sarmad upper and lower ext 5/5, sensory symm and intact CN 2-12 grossly intact, no ataxia, no nystagmus, no dysmetria, no ddk, symm sarmad, no pronator drift

## 2023-10-01 NOTE — ED ADULT NURSE NOTE - NSFALLUNIVINTERV_ED_ALL_ED
Bed/Stretcher in lowest position, wheels locked, appropriate side rails in place/Call bell, personal items and telephone in reach/Instruct patient to call for assistance before getting out of bed/chair/stretcher/Non-slip footwear applied when patient is off stretcher/[place] to call system/Physically safe environment - no spills, clutter or unnecessary equipment/Purposeful proactive rounding/Room/bathroom lighting operational, light cord in reach

## 2023-10-01 NOTE — DISCHARGE NOTE PROVIDER - ATTENDING DISCHARGE PHYSICAL EXAMINATION:
· Constitutional	well-groomed; no distress  · Eyes	PERRL; EOMI; conjunctiva clear  · Respiratory	clear to auscultation bilaterally; no wheezes; no respiratory distress; respirations non-labored  · Cardiovascular	regular rate and rhythm; S1 S2 present; no pedal edema  · Gastrointestinal	soft; nontender; nondistended; normal active bowel sounds  · Neurological	sensation intact; responds to verbal commands  · Motor	Speech now back to normal as per patient and significant other at bed side  · Skin	warm and dry; color normal; no rashes  · Musculoskeletal	ROM intact; strength 5/5 bilateral upper extremities; strength 5/5 bilateral lower extremities; no joint swelling; no joint erythema; no joint warmth; no calf tenderness  · Psychiatric	normal affect; alert and oriented x3; normal behavior   · GENERAL	well-groomed; no distress  · Eyes	PERRL; EOMI; conjunctiva clear  · Respiratory	clear to auscultation bilaterally; no wheezes; no respiratory distress; respirations non-labored  · Cardiovascular	regular rate and rhythm; S1 S2 present; no pedal edema  · Gastrointestinal	soft; nontender; nondistended; normal active bowel sounds  · Neurological	sensation intact; responds to verbal commands  · Motor	Speech now back to normal as per patient and significant other at bed side  · Skin	warm and dry; color normal; no rashes  · Musculoskeletal	ROM intact; strength 5/5 bilateral upper extremities; strength 5/5 bilateral lower extremities; no joint swelling; no joint erythema; no joint warmth; no calf tenderness  · Psychiatric	normal affect; alert and oriented x3; normal behavior

## 2023-10-01 NOTE — ED ADULT NURSE REASSESSMENT NOTE - NS ED NURSE REASSESS COMMENT FT1
pt on cardiac monitor in sinus tach, pt resting comfortably showing no signs of respiratory distress or pain, the pt is calm and cooperative

## 2023-10-01 NOTE — CONSULT NOTE ADULT - SUBJECTIVE AND OBJECTIVE BOX
Preliminary note, official note pending attending review/signature.                               [facility] Stroke Team  CC: slurred speech and word finding difficulty  HPI:  Patient is a 53 year old male with PMHx of HTN, CHFrEF, GERD, and HLD who presented to [facility] on 10/1/23 due to 4 episodes of transient slurred speech and difficulty getting the words out. Patient stated that at 8:00 pm on 9/30/23 patient's wife noticed that he was slurry his words. Patient also stated that he was having trouble getting the words out,even though he knew what he wanted to say. Episode lasted around 2-3 minutes. A few minutes later patient experienced another similar episode. Throughout the night patient had another 2 episodes of the exact nature which prompted him to come to the ED. Stroke team called for consult. On presentation patient stated to no longer have any symptoms and had not had an episode since. Denied any weakness or tingling.  Did admit to missing some of his hypertensive medications recently. SBP on admission was 195.    PAST MEDICAL & SURGICAL HISTORY:  CHF, chronic  Hypertension  Hyperlipidemia  S/P hernia repair    MEDICATIONS  (STANDING):  aspirin enteric coated 81 milliGRAM(s) Oral daily  atorvastatin 40 milliGRAM(s) Oral at bedtime  dapagliflozin 10 milliGRAM(s) Oral every 24 hours  enoxaparin Injectable 40 milliGRAM(s) SubCutaneous every 24 hours  famotidine    Tablet 20 milliGRAM(s) Oral two times a day    MEDICATIONS  (PRN):  acetaminophen     Tablet .. 650 milliGRAM(s) Oral every 6 hours PRN Temp greater or equal to 38C (100.4F), Mild Pain (1 - 3)  aluminum hydroxide/magnesium hydroxide/simethicone Suspension 30 milliLiter(s) Oral every 4 hours PRN Dyspepsia  melatonin 3 milliGRAM(s) Oral at bedtime PRN Insomnia  ondansetron Injectable 4 milliGRAM(s) IV Push every 8 hours PRN Nausea and/or Vomiting      Allergies- No Known Allergies    SOCIAL HISTORY:  no tob,   alcohol - socially  no drugs    FAMILY HISTORY:  Family history of stroke (Mother)      ROS: 14 point ROS negative other than what is present in HPI or below    Vital Signs Last 24 Hrs  T(C): 36.8 (01 Oct 2023 11:26), Max: 36.8 (01 Oct 2023 11:26)  T(F): 98.2 (01 Oct 2023 11:26), Max: 98.2 (01 Oct 2023 11:26)  HR: 96 (01 Oct 2023 11:26) (87 - 122)  BP: 144/99 (01 Oct 2023 11:26) (135/94 - 195/131)  BP(mean): --  RR: 20 (01 Oct 2023 11:26) (18 - 20)  SpO2: 99% (01 Oct 2023 11:26) (96% - 99%)    Parameters below as of 01 Oct 2023 11:26  Patient On (Oxygen Delivery Method): room air      General: Patient appears very anxious. Fiance at bedside     Detailed Neurologic Exam:    Mental status: The patient is awake and alert and has normal attention span.  The patient is fully oriented to self, place, year, and month.  The patient is able to name objects and their function, follow commands, and repeat sentences.    Cranial nerves: No dysarthria noted Pupils equal and react symmetrically to light. There is no visual field deficit to confrontation. Extraocular motion is full with no nystagmus. There is no ptosis. Facial sensation is intact. Facial musculature is symmetric. Tongue is midline.    Motor: There is normal bulk and tone.  There is no tremor.  Strength is 5/5 in the right arm and leg.   Strength is 5/5 in the left arm and leg.    Sensation: Intact to light touch and pin in 4 extremities    Cerebellar: There is no dysmetria on finger to nose testing.    Gait : deferred    NIH SS: 0  DATE: 10/1/23  TIME: 10:15 am   1A: Level of consciousness (0-3): 0  1B: Questions (0-2): 0  1C: Commands (0-2): 0  2: Gaze (0-2): 0  3: Visual fields (0-3): 0  4: Facial palsy (0-3): 0  MOTOR:  5A: Left arm motor drift (0-4): 0  5B: Right arm motor drift (0-4): 0  6A: Left leg motor drift (0-4): 0  6B: Right leg motor drift (0-4): 0  7: Limb ataxia (0-2): 0  SENSORY:  8: Sensation (0-2): 0  SPEECH:  9: Language (0-3): 0  10: Dysarthria (0-2): 0  EXTINCTION:  11: Extinction/inattention (0-2): 0    TOTAL SCORE: 0    prehospital mRS= 0      LABS:                         15.0   7.07  )-----------( 371      ( 01 Oct 2023 01:58 )             45.2       10-01    138  |  103  |  14.9  ----------------------------<  99  3.7   |  23.0  |  1.33<H>    Ca    8.1<L>      01 Oct 2023 01:58    TPro  6.7  /  Alb  3.1<L>  /  TBili  0.4  /  DBili  x   /  AST  26  /  ALT  30  /  AlkPhos  76  10-01      PT/INR - ( 01 Oct 2023 01:58 )   PT: 13.1 sec;   INR: 1.19 ratio         PTT - ( 01 Oct 2023 01:58 )  PTT:30.7 sec    Lipid panel: Lipid Profile (10.01.23 @ 01:58)    Cholesterol: 127 mg/dL   Triglycerides, Serum: 43: Lipemic. Interpret with caution mg/dL   HDL Cholesterol: 38 mg/dL   Non HDL Cholesterol: 89   LDL Cholesterol Calculated: 80 mg/dL    HgbA1c: A1C with Estimated Average Glucose (10.01.23 @ 01:58)    A1C with Estimated Average Glucose Result: 5.5 %   Estimated Average Glucose: 111 mg/dL    RADIOLOGY & ADDITIONAL STUDIES (independently reviewed unless otherwise noted):  CT Brain Stroke Protocol (10.01.23 @ 01:47)   IMPRESSION:  No acute intracranial hemorrhage.    Focal hypoattenuation in the region of the left frontal operculum, which   may reflect acute or subacute infarct.    Consider MRI for further assessment if not contraindicated.    CT Angio Neck Stroke Protocol w/ IV Cont (10.01.23 @ 01:54)   IMPRESSION:  CTA head/neck: No proximal large vessel occlusion, high-grade stenosis,   or dissection.    CT perfusion: No core infarct identified. Rapid analysis shows 6 cc of   decreased perfusion in the right frontal and left occipital region, which   may be artifactual.    MRI may be considered for more sensitive evaluation for acute infarction   if symptoms persist.    A few patchy groundglass opacities in the right greater than left lung   apices, nonspecific, possibly infectious, correlate clinically.       Preliminary note, official note pending attending review/signature.                               [facility] Stroke Team  CC: slurred speech and word finding difficulty  HPI:  Patient is a 53 year old male with PMHx of HTN, CHFrEF, GERD, and HLD who presented to [facility] on 10/1/23 due to 4 episodes of transient slurred speech and difficulty getting the words out. Patient stated that at 8:00 pm on 9/30/23 patient's wife noticed that he was slurry his words. Patient also stated that he was having trouble getting the words out,even though he knew what he wanted to say. Episode lasted around 2-3 minutes. A few minutes later patient experienced another similar episode. Throughout the night patient had another 2 episodes of the exact nature which prompted him to come to the ED. Stroke team called for consult. On presentation patient stated to no longer have any symptoms and had not had an episode since. Denied any weakness or tingling.  Did admit to missing some of his hypertensive medications recently. SBP on admission was 195. States to take ASA 81mg daily.     PAST MEDICAL & SURGICAL HISTORY:  CHF, chronic  Hypertension  Hyperlipidemia  S/P hernia repair    MEDICATIONS  (STANDING):  aspirin enteric coated 81 milliGRAM(s) Oral daily  atorvastatin 40 milliGRAM(s) Oral at bedtime  dapagliflozin 10 milliGRAM(s) Oral every 24 hours  enoxaparin Injectable 40 milliGRAM(s) SubCutaneous every 24 hours  famotidine    Tablet 20 milliGRAM(s) Oral two times a day    MEDICATIONS  (PRN):  acetaminophen     Tablet .. 650 milliGRAM(s) Oral every 6 hours PRN Temp greater or equal to 38C (100.4F), Mild Pain (1 - 3)  aluminum hydroxide/magnesium hydroxide/simethicone Suspension 30 milliLiter(s) Oral every 4 hours PRN Dyspepsia  melatonin 3 milliGRAM(s) Oral at bedtime PRN Insomnia  ondansetron Injectable 4 milliGRAM(s) IV Push every 8 hours PRN Nausea and/or Vomiting      Allergies- No Known Allergies    SOCIAL HISTORY:  no tob,   alcohol - socially  no drugs    FAMILY HISTORY:  Family history of stroke (Mother)      ROS: 14 point ROS negative other than what is present in HPI or below    Vital Signs Last 24 Hrs  T(C): 36.8 (01 Oct 2023 11:26), Max: 36.8 (01 Oct 2023 11:26)  T(F): 98.2 (01 Oct 2023 11:26), Max: 98.2 (01 Oct 2023 11:26)  HR: 96 (01 Oct 2023 11:26) (87 - 122)  BP: 144/99 (01 Oct 2023 11:26) (135/94 - 195/131)  BP(mean): --  RR: 20 (01 Oct 2023 11:26) (18 - 20)  SpO2: 99% (01 Oct 2023 11:26) (96% - 99%)    Parameters below as of 01 Oct 2023 11:26  Patient On (Oxygen Delivery Method): room air      General: Patient appears very anxious. Fiance at bedside     Detailed Neurologic Exam:    Mental status: The patient is awake and alert and has normal attention span.  The patient is fully oriented to self, place, year, and month. The patient is able to name objects and their function, follow commands, and repeat sentences.    Cranial nerves: No dysarthria noted Pupils equal and react symmetrically to light. There is no visual field deficit to confrontation. Extraocular motion is full with no nystagmus. There is no ptosis. Facial sensation is intact. Facial musculature is symmetric. Tongue is midline.    Motor: There is normal bulk and tone.  There is no tremor.  Strength is 5/5 in the right arm and leg.   Strength is 5/5 in the left arm and leg.    Sensation: Intact to light touch and pin in 4 extremities    Cerebellar: There is no dysmetria on finger to nose testing.    Gait : deferred    NIH SS: 0  DATE: 10/1/23  TIME: 10:15 am   1A: Level of consciousness (0-3): 0  1B: Questions (0-2): 0  1C: Commands (0-2): 0  2: Gaze (0-2): 0  3: Visual fields (0-3): 0  4: Facial palsy (0-3): 0  MOTOR:  5A: Left arm motor drift (0-4): 0  5B: Right arm motor drift (0-4): 0  6A: Left leg motor drift (0-4): 0  6B: Right leg motor drift (0-4): 0  7: Limb ataxia (0-2): 0  SENSORY:  8: Sensation (0-2): 0  SPEECH:  9: Language (0-3): 0  10: Dysarthria (0-2): 0  EXTINCTION:  11: Extinction/inattention (0-2): 0    TOTAL SCORE: 0    prehospital mRS= 0      LABS:                         15.0   7.07  )-----------( 371      ( 01 Oct 2023 01:58 )             45.2       10-01    138  |  103  |  14.9  ----------------------------<  99  3.7   |  23.0  |  1.33<H>    Ca    8.1<L>      01 Oct 2023 01:58    TPro  6.7  /  Alb  3.1<L>  /  TBili  0.4  /  DBili  x   /  AST  26  /  ALT  30  /  AlkPhos  76  10-01      PT/INR - ( 01 Oct 2023 01:58 )   PT: 13.1 sec;   INR: 1.19 ratio         PTT - ( 01 Oct 2023 01:58 )  PTT:30.7 sec    Lipid panel: Lipid Profile (10.01.23 @ 01:58)    Cholesterol: 127 mg/dL   Triglycerides, Serum: 43: Lipemic. Interpret with caution mg/dL   HDL Cholesterol: 38 mg/dL   Non HDL Cholesterol: 89   LDL Cholesterol Calculated: 80 mg/dL    HgbA1c: A1C with Estimated Average Glucose (10.01.23 @ 01:58)    A1C with Estimated Average Glucose Result: 5.5 %   Estimated Average Glucose: 111 mg/dL    RADIOLOGY & ADDITIONAL STUDIES (independently reviewed unless otherwise noted):  CT Brain Stroke Protocol (10.01.23 @ 01:47)   IMPRESSION:  No acute intracranial hemorrhage.    Focal hypoattenuation in the region of the left frontal operculum, which   may reflect acute or subacute infarct.    Consider MRI for further assessment if not contraindicated.    CT Angio Neck Stroke Protocol w/ IV Cont (10.01.23 @ 01:54)   IMPRESSION:  CTA head/neck: No proximal large vessel occlusion, high-grade stenosis,   or dissection.    CT perfusion: No core infarct identified. Rapid analysis shows 6 cc of   decreased perfusion in the right frontal and left occipital region, which   may be artifactual.    MRI may be considered for more sensitive evaluation for acute infarction   if symptoms persist.    A few patchy groundglass opacities in the right greater than left lung   apices, nonspecific, possibly infectious, correlate clinically.                                      [facility] Stroke Team  CC: slurred speech and word finding difficulty  HPI:  Patient is a 53 year old male with PMHx of HTN, CHFrEF, GERD, and HLD who presented to [facility] on 10/1/23 due to 4 episodes of transient slurred speech and difficulty getting the words out. Patient stated that at 8:00 pm on 9/30/23 patient's wife noticed that he was slurry his words. Patient also stated that he was having trouble getting the words out,even though he knew what he wanted to say. Episode lasted around 2-3 minutes. A few minutes later patient experienced another similar episode. Throughout the night patient had another 2 episodes of the exact nature which prompted him to come to the ED. Stroke team called for consult. On presentation patient stated to no longer have any symptoms and had not had an episode since. Denied any weakness or tingling.  Did admit to missing some of his hypertensive medications recently. SBP on admission was 195. States to take ASA 81mg daily.     PAST MEDICAL & SURGICAL HISTORY:  CHF, chronic  Hypertension  Hyperlipidemia  S/P hernia repair    MEDICATIONS  (STANDING):  aspirin enteric coated 81 milliGRAM(s) Oral daily  atorvastatin 40 milliGRAM(s) Oral at bedtime  dapagliflozin 10 milliGRAM(s) Oral every 24 hours  enoxaparin Injectable 40 milliGRAM(s) SubCutaneous every 24 hours  famotidine    Tablet 20 milliGRAM(s) Oral two times a day    MEDICATIONS  (PRN):  acetaminophen     Tablet .. 650 milliGRAM(s) Oral every 6 hours PRN Temp greater or equal to 38C (100.4F), Mild Pain (1 - 3)  aluminum hydroxide/magnesium hydroxide/simethicone Suspension 30 milliLiter(s) Oral every 4 hours PRN Dyspepsia  melatonin 3 milliGRAM(s) Oral at bedtime PRN Insomnia  ondansetron Injectable 4 milliGRAM(s) IV Push every 8 hours PRN Nausea and/or Vomiting      Allergies- No Known Allergies    SOCIAL HISTORY:  no tob,   alcohol - socially  no drugs    FAMILY HISTORY:  Family history of stroke (Mother)      ROS: 14 point ROS negative other than what is present in HPI or below    Vital Signs Last 24 Hrs  T(C): 36.8 (01 Oct 2023 11:26), Max: 36.8 (01 Oct 2023 11:26)  T(F): 98.2 (01 Oct 2023 11:26), Max: 98.2 (01 Oct 2023 11:26)  HR: 96 (01 Oct 2023 11:26) (87 - 122)  BP: 144/99 (01 Oct 2023 11:26) (135/94 - 195/131)  BP(mean): --  RR: 20 (01 Oct 2023 11:26) (18 - 20)  SpO2: 99% (01 Oct 2023 11:26) (96% - 99%)    Parameters below as of 01 Oct 2023 11:26  Patient On (Oxygen Delivery Method): room air      General: Patient appears very anxious. Fiance at bedside     Detailed Neurologic Exam:    Mental status: The patient is awake and alert and has normal attention span.  The patient is fully oriented to self, place, year, and month. The patient is able to name objects and their function, follow commands, and repeat sentences.    Cranial nerves: No dysarthria noted Pupils equal and react symmetrically to light. There is no visual field deficit to confrontation. Extraocular motion is full with no nystagmus. There is no ptosis. Facial sensation is intact. Facial musculature is symmetric. Tongue is midline.    Motor: There is normal bulk and tone.  There is no tremor.  Strength is 5/5 in the right arm and leg.   Strength is 5/5 in the left arm and leg.    Sensation: Intact to light touch and pin in 4 extremities    Cerebellar: There is no dysmetria on finger to nose testing.    Gait : deferred    NIH SS: 0  DATE: 10/1/23  TIME: 10:15 am   1A: Level of consciousness (0-3): 0  1B: Questions (0-2): 0  1C: Commands (0-2): 0  2: Gaze (0-2): 0  3: Visual fields (0-3): 0  4: Facial palsy (0-3): 0  MOTOR:  5A: Left arm motor drift (0-4): 0  5B: Right arm motor drift (0-4): 0  6A: Left leg motor drift (0-4): 0  6B: Right leg motor drift (0-4): 0  7: Limb ataxia (0-2): 0  SENSORY:  8: Sensation (0-2): 0  SPEECH:  9: Language (0-3): 0  10: Dysarthria (0-2): 0  EXTINCTION:  11: Extinction/inattention (0-2): 0    TOTAL SCORE: 0    prehospital mRS= 0      LABS:                         15.0   7.07  )-----------( 371      ( 01 Oct 2023 01:58 )             45.2       10-01    138  |  103  |  14.9  ----------------------------<  99  3.7   |  23.0  |  1.33<H>    Ca    8.1<L>      01 Oct 2023 01:58    TPro  6.7  /  Alb  3.1<L>  /  TBili  0.4  /  DBili  x   /  AST  26  /  ALT  30  /  AlkPhos  76  10-01      PT/INR - ( 01 Oct 2023 01:58 )   PT: 13.1 sec;   INR: 1.19 ratio         PTT - ( 01 Oct 2023 01:58 )  PTT:30.7 sec    Lipid panel: Lipid Profile (10.01.23 @ 01:58)    Cholesterol: 127 mg/dL   Triglycerides, Serum: 43: Lipemic. Interpret with caution mg/dL   HDL Cholesterol: 38 mg/dL   Non HDL Cholesterol: 89   LDL Cholesterol Calculated: 80 mg/dL    HgbA1c: A1C with Estimated Average Glucose (10.01.23 @ 01:58)    A1C with Estimated Average Glucose Result: 5.5 %   Estimated Average Glucose: 111 mg/dL    RADIOLOGY & ADDITIONAL STUDIES (independently reviewed unless otherwise noted):  CT Brain Stroke Protocol (10.01.23 @ 01:47)   IMPRESSION:  No acute intracranial hemorrhage.    Focal hypoattenuation in the region of the left frontal operculum, which   may reflect acute or subacute infarct.    Consider MRI for further assessment if not contraindicated.    CT Angio Neck Stroke Protocol w/ IV Cont (10.01.23 @ 01:54)   IMPRESSION:  CTA head/neck: No proximal large vessel occlusion, high-grade stenosis,   or dissection.    CT perfusion: No core infarct identified. Rapid analysis shows 6 cc of   decreased perfusion in the right frontal and left occipital region, which   may be artifactual.    MRI may be considered for more sensitive evaluation for acute infarction   if symptoms persist.    A few patchy groundglass opacities in the right greater than left lung   apices, nonspecific, possibly infectious, correlate clinically.

## 2023-10-01 NOTE — H&P ADULT - NSHPPHYSICALEXAM_GEN_ALL_CORE
Vital Signs Last 24 Hrs  T(C): 36.6 (01 Oct 2023 01:19), Max: 36.6 (01 Oct 2023 01:19)  T(F): 97.8 (01 Oct 2023 01:19), Max: 97.8 (01 Oct 2023 01:19)  HR: 90 (01 Oct 2023 04:00) (90 - 122)  BP: 146/107 (01 Oct 2023 04:00) (146/107 - 195/131)  BP(mean): --  RR: 20 (01 Oct 2023 04:00) (18 - 20)  SpO2: 97% (01 Oct 2023 04:00) (96% - 99%)    Parameters below as of 01 Oct 2023 04:00  Patient On (Oxygen Delivery Method): room air

## 2023-10-01 NOTE — DISCHARGE NOTE PROVIDER - HOSPITAL COURSE
54 y/o male with PMH of HTN, CHFrEF, GERD, HLD came to the ED complaining of slurred speech. Symptom started around 8PM the night prior to admission while he was eating dinner with his significant other; it happened for a few second but happened again; 4 episodes in total. In the ED, CT head: acute or subacute infarct left frontal operculum (possible artifact)     Slurred speech due to acute CVA   MRI and echo order   asa and statin     Hx of CHFrEF/HTN   Last echo (06/16/22) EF: <20  On Entresto 49-51mg bid   Torsemide 20mg bid  Metoprolol ER 50mg   Farxiga 10mg     CKD-3   Stable   Monitor renal function     if mri is negative is ready for dc home      of note patient had an abnormal ekg  similar compared to multiple prior ekgs  no chest pain and normal trop 52 y/o M w/ PMH of HTN, HFrEF, CKD IIIb, GERD, HLD came to the ED complaining of slurred speech and found to have an acute/subacute infarct on CTH.  Pt admitted for CVA.  MRI brain also confirming an acute infarct.  Pt has no deficits and presenting symptoms have since resolved.  Neurology following and recommending plavix for 21 dys.  Pt will f/u with cardiology and neurology outpt.   Pt medically stable for d/c home.              52 y/o M w/ PMH of HTN, HFrEF, CKD IIIb, GERD, HLD came to the ED complaining of slurred speech and found to have an acute/subacute infarct on CTH.  Pt admitted for CVA.  MRI brain also confirming an acute infarct.  Pt has no deficits and presenting symptoms have since resolved.  Neurology following and recommending plavix for 21 dys.  Pt going for JONNA and will have MCOT placed.  Pt will f/u with EP outpt for ICD placement in light of persistently rEF.  Pt will f/u with cardiology, EP and neurology outpt.   Pt medically stable for d/c home.

## 2023-10-01 NOTE — ED PROVIDER NOTE - ATTENDING CONTRIBUTION TO CARE
54yo male with pmh of HTN, CHF, GERD, HLD presents with slurred speech. PT was eating before 8pm and started to have difficulty chewing and then slurred speech. Pt went home to get rest and symptoms continued. Pt with slurred speech no weakness. Pt denies fevers/chills, ha, loc, cp/sob/palp, cough, abd pain/n/v/d, urinary symptoms, recent travel and sick contacts. NIHSS 0  Stroke code called on arrival patient not a thrombolytic candidate due to NIH stroke scale being 0 as well as timing of events.  CT noncontrast showed findings of an acute infarct patient neuro intact hypertensive given antihypertensives admit for further management and evaluation by neurology as well as MRI.

## 2023-10-01 NOTE — ED PROVIDER NOTE - OBJECTIVE STATEMENT
54yo male with pmh of HTN, CHF, GERD, HLD presents with slurred speech. PT was eating before 8pm and started to have difficulty chewing and then slurred speech. Pt went home to get rest and symptoms continued. Pt with slurred speech no weakness. Pt denies fevers/chills, ha, loc, cp/sob/palp, cough, abd pain/n/v/d, urinary symptoms, recent travel and sick contacts.

## 2023-10-01 NOTE — CONSULT NOTE ADULT - ASSESSMENT
ASSESSMENT: Patient is a 53 year old male with PMHx of HTN, CHFrEF, GERD, and HLD who presented to [facility] on 10/1/23 due to 4 episodes of transient dysarthria and difficulty getting the words out. Patient stated that at 8:00 pm on 9/30/23 patient's wife noticed that he was slurry his words. Patient also stated that he was having trouble getting the words out,even though he knew what he wanted to say. Episode lasted around 2-3 minutes. A few minutes later patient experienced another similar episode. Throughout the night patient had another 2 episodes of the exact nature which prompted him to come to the ED. SBP on admittion was in the 190s and patient did admit to missing some of his hypertensive medications recently. Initial head CT demonstrated focal hypoattenuation in the region of the left frontal operculum, which may reflect acute or subacute infarct. CTA head and neck revealed no LVO, high grade stenosis or dissection. CTP did demonstrate 6 cc of decreased perfusion in the right frontal and left occipital region, which may be artifactual. Patient not a tenecteplase candidate due to resolution of symptoms with NIHSS of 0. Patient not a mechanical thrombectomy candidate due to no presence of LVO. Patient admitted for stroke workup and will need MRI brain w/out for further evaluation and management.     NEURO:   -Neurologically back to baseline as per patient and fiance at bedside. Patient with nonfocal neurological exam.   -Continue close monitoring for neurologic deterioration    - Stroke neuro checks q 2  -Obtain MRI Brain w/o  - SBP goal 160-180. Avoid rapid fluctuations and hypotension.  -Risk factor modification once discharged. Patient was strongly advised that he should monitor and record blood pressure twice a day   -ANTITHROMBOTIC THERAPY: ASA 81mg daily  -titrate statin to LDL goal less than 70. LDL is 80. Suggest high intensity statin as long as no medical contraindications  -Dysphagia screen: Pass.   -Physical therapy/OT/Speech eval/treatment.     -CARDIOVASCULAR: obtain TTE, cardiac monitoring w/ telemetry for now, further evaluation pending findings of noted workup                              -HEMATOLOGY: H/H 15.0/45.2.  Platelets 371. patient should have all age and risk appropriate malignancy screenings with PCP or sooner if clinically suspected      DVT ppx: Heparin s.c [] LMWH [x]     PULMONARY:  protecting airway, saturating well     RENAL: BUN/Cr 14.9/1.33. monitor urine output, maintain adequate hydration       Na Goal:  135-145    ID: afebrile, no leukocytosis, monitor for si/sx of infection     OTHER:  condition and plan of care d/w patient and fiance at bedside, questions and concerns addressed.   -Patient states to feel very anxious and have a hx of anxiety. Suggest possible behavorial health consult  -Patient strongly counseled the importance of taking his medications as prescribed and the risks that could come form not doing so.     DISPOSITION: Rehab or home depending on PT eval once stable and workup is complete      CORE MEASURES:        Admission NIHSS: 0     Tenecteplase : [] YES [x] NO      LDL/HDL/A1C: 80/38/5.5%     Depression Screen- if depression hx and/or present      Statin Therapy: as noted     Dysphagia Screen: [x] PASS [] FAIL     Smoking [] YES [x] NO      Afib [] YES [x] NO     Stroke Education [] YES [] NO- ordered and pending    Obtain screening lower extremity venous ultrasound in patients who meet 1 or more of the following criteria as patient is high risk for DVT/PE on admission:   [] History of DVT/PE  []Hypercoagulable states (Factor V Leiden, Cancer, OCP, etc. )  []Prolonged immobility (hemiplegia/hemiparesis/post operative or any other extended immobilization)  [] Transferred from outside facility (Rehab or Long term care)  [] Age </= to 50

## 2023-10-01 NOTE — ED ADULT TRIAGE NOTE - STATUS:
Detail Level: Simple
Instructions: This plan will send the code FBSE to the PM system.  DO NOT or CHANGE the price.
Price (Do Not Change): 0.00
Applied

## 2023-10-01 NOTE — H&P ADULT - TIME BILLING
Chart, labs and imaging reviewed. Physical examination, medication reconciliation and documentation. Discussion with patient and significant other at bed side.

## 2023-10-02 LAB
ALBUMIN SERPL ELPH-MCNC: 3.1 G/DL — LOW (ref 3.3–5.2)
ALP SERPL-CCNC: 66 U/L — SIGNIFICANT CHANGE UP (ref 40–120)
ALT FLD-CCNC: 25 U/L — SIGNIFICANT CHANGE UP
ANION GAP SERPL CALC-SCNC: 13 MMOL/L — SIGNIFICANT CHANGE UP (ref 5–17)
AST SERPL-CCNC: 20 U/L — SIGNIFICANT CHANGE UP
BASOPHILS # BLD AUTO: 0.08 K/UL — SIGNIFICANT CHANGE UP (ref 0–0.2)
BASOPHILS NFR BLD AUTO: 1.2 % — SIGNIFICANT CHANGE UP (ref 0–2)
BILIRUB SERPL-MCNC: 0.4 MG/DL — SIGNIFICANT CHANGE UP (ref 0.4–2)
BUN SERPL-MCNC: 18.9 MG/DL — SIGNIFICANT CHANGE UP (ref 8–20)
CALCIUM SERPL-MCNC: 8.2 MG/DL — LOW (ref 8.4–10.5)
CHLORIDE SERPL-SCNC: 101 MMOL/L — SIGNIFICANT CHANGE UP (ref 96–108)
CO2 SERPL-SCNC: 25 MMOL/L — SIGNIFICANT CHANGE UP (ref 22–29)
CREAT SERPL-MCNC: 1.41 MG/DL — HIGH (ref 0.5–1.3)
EGFR: 60 ML/MIN/1.73M2 — SIGNIFICANT CHANGE UP
EOSINOPHIL # BLD AUTO: 0.34 K/UL — SIGNIFICANT CHANGE UP (ref 0–0.5)
EOSINOPHIL NFR BLD AUTO: 5.2 % — SIGNIFICANT CHANGE UP (ref 0–6)
GLUCOSE SERPL-MCNC: 93 MG/DL — SIGNIFICANT CHANGE UP (ref 70–99)
HCT VFR BLD CALC: 44.4 % — SIGNIFICANT CHANGE UP (ref 39–50)
HGB BLD-MCNC: 14.5 G/DL — SIGNIFICANT CHANGE UP (ref 13–17)
IMM GRANULOCYTES NFR BLD AUTO: 0.3 % — SIGNIFICANT CHANGE UP (ref 0–0.9)
LYMPHOCYTES # BLD AUTO: 2.24 K/UL — SIGNIFICANT CHANGE UP (ref 1–3.3)
LYMPHOCYTES # BLD AUTO: 34 % — SIGNIFICANT CHANGE UP (ref 13–44)
MAGNESIUM SERPL-MCNC: 2.3 MG/DL — SIGNIFICANT CHANGE UP (ref 1.6–2.6)
MCHC RBC-ENTMCNC: 24.4 PG — LOW (ref 27–34)
MCHC RBC-ENTMCNC: 32.7 GM/DL — SIGNIFICANT CHANGE UP (ref 32–36)
MCV RBC AUTO: 74.6 FL — LOW (ref 80–100)
MONOCYTES # BLD AUTO: 0.74 K/UL — SIGNIFICANT CHANGE UP (ref 0–0.9)
MONOCYTES NFR BLD AUTO: 11.2 % — SIGNIFICANT CHANGE UP (ref 2–14)
NEUTROPHILS # BLD AUTO: 3.17 K/UL — SIGNIFICANT CHANGE UP (ref 1.8–7.4)
NEUTROPHILS NFR BLD AUTO: 48.1 % — SIGNIFICANT CHANGE UP (ref 43–77)
PLATELET # BLD AUTO: 349 K/UL — SIGNIFICANT CHANGE UP (ref 150–400)
POTASSIUM SERPL-MCNC: 3.5 MMOL/L — SIGNIFICANT CHANGE UP (ref 3.5–5.3)
POTASSIUM SERPL-SCNC: 3.5 MMOL/L — SIGNIFICANT CHANGE UP (ref 3.5–5.3)
PROT SERPL-MCNC: 6.4 G/DL — LOW (ref 6.6–8.7)
RBC # BLD: 5.95 M/UL — HIGH (ref 4.2–5.8)
RBC # FLD: 16.1 % — HIGH (ref 10.3–14.5)
SODIUM SERPL-SCNC: 139 MMOL/L — SIGNIFICANT CHANGE UP (ref 135–145)
WBC # BLD: 6.59 K/UL — SIGNIFICANT CHANGE UP (ref 3.8–10.5)
WBC # FLD AUTO: 6.59 K/UL — SIGNIFICANT CHANGE UP (ref 3.8–10.5)

## 2023-10-02 PROCEDURE — 99233 SBSQ HOSP IP/OBS HIGH 50: CPT

## 2023-10-02 PROCEDURE — 70551 MRI BRAIN STEM W/O DYE: CPT | Mod: 26

## 2023-10-02 RX ORDER — LABETALOL HCL 100 MG
5 TABLET ORAL ONCE
Refills: 0 | Status: COMPLETED | OUTPATIENT
Start: 2023-10-02 | End: 2023-10-02

## 2023-10-02 RX ADMIN — ENOXAPARIN SODIUM 40 MILLIGRAM(S): 100 INJECTION SUBCUTANEOUS at 11:38

## 2023-10-02 RX ADMIN — Medication 5 MILLIGRAM(S): at 11:38

## 2023-10-02 RX ADMIN — FAMOTIDINE 20 MILLIGRAM(S): 10 INJECTION INTRAVENOUS at 05:20

## 2023-10-02 RX ADMIN — FAMOTIDINE 20 MILLIGRAM(S): 10 INJECTION INTRAVENOUS at 17:58

## 2023-10-02 RX ADMIN — Medication 81 MILLIGRAM(S): at 11:38

## 2023-10-02 RX ADMIN — DAPAGLIFLOZIN 10 MILLIGRAM(S): 10 TABLET, FILM COATED ORAL at 14:35

## 2023-10-02 RX ADMIN — ATORVASTATIN CALCIUM 40 MILLIGRAM(S): 80 TABLET, FILM COATED ORAL at 21:49

## 2023-10-02 NOTE — SPEECH LANGUAGE PATHOLOGY EVALUATION - SLP GENERAL OBSERVATIONS
Pt recd OOB in chair, A&A, 0x4, cooperative and agreeable to evaluation, girlfriend at bedside, pain 0/10 pre/post eval

## 2023-10-02 NOTE — PROGRESS NOTE ADULT - SUBJECTIVE AND OBJECTIVE BOX
Chief Complaint:      SUBJECTIVE / OVERNIGHT EVENTS:    Patient denies chest pain, SOB, abd pain, N/V, fever, chills, dysuria or any other complaints. All remainder ROS negative.       I&O's Summary        PHYSICAL EXAM:  Vital Signs Last 24 Hrs  T(C): 36.7 (02 Oct 2023 17:30), Max: 36.8 (01 Oct 2023 21:30)  T(F): 98.1 (02 Oct 2023 17:30), Max: 98.2 (01 Oct 2023 21:30)  HR: 100 (02 Oct 2023 17:30) (96 - 108)  BP: 141/97 (02 Oct 2023 17:30) (126/76 - 141/97)  BP(mean): --  RR: 18 (02 Oct 2023 17:30) (16 - 18)  SpO2: 98% (02 Oct 2023 17:30) (95% - 98%)    Parameters below as of 02 Oct 2023 17:30  Patient On (Oxygen Delivery Method): room air          CONSTITUTIONAL: pt examined bedside, laying comfortably in bed in NAD  HEENT: NC/AT, moist oral mucosa, clear conjunctiva, sclera nonicteric, EOMI  RESPIRATORY: Normal respiratory effort; CTA b/l, no wheezing, rhonchi, rales  CARDIOVASCULAR: RRR, normal S1 and S2, no murmur/rub/gallop  ABDOMEN: soft, NT/ND, normoactive bowel sounds, no rebound/guarding, no HSM  MUSCLOSKELETAL:  no joint swelling or tenderness to palpation  EXTREMITIES: No cynaosis, no clubbing, no lower extremity edema; Peripheral pulses are 2+ bilaterally  PSYCH: affect appropriate and cooperative  NEUROLOGY: A+O to person, place, and time, no focal neurologic deficits appreciated   SKIN: No rashes or no palpable lesions        LABS:                        14.5   6.59  )-----------( 349      ( 02 Oct 2023 06:28 )             44.4     10-02    139  |  101  |  18.9  ----------------------------<  93  3.5   |  25.0  |  1.41<H>    Ca    8.2<L>      02 Oct 2023 06:28  Mg     2.3     10-02    TPro  6.4<L>  /  Alb  3.1<L>  /  TBili  0.4  /  DBili  x   /  AST  20  /  ALT  25  /  AlkPhos  66  10-02    PT/INR - ( 01 Oct 2023 01:58 )   PT: 13.1 sec;   INR: 1.19 ratio         PTT - ( 01 Oct 2023 01:58 )  PTT:30.7 sec  CARDIAC MARKERS ( 01 Oct 2023 01:58 )  x     / <0.01 ng/mL / x     / x     / x          Urinalysis Basic - ( 02 Oct 2023 06:28 )    Color: x / Appearance: x / SG: x / pH: x  Gluc: 93 mg/dL / Ketone: x  / Bili: x / Urobili: x   Blood: x / Protein: x / Nitrite: x   Leuk Esterase: x / RBC: x / WBC x   Sq Epi: x / Non Sq Epi: x / Bacteria: x        CAPILLARY BLOOD GLUCOSE            RADIOLOGY & ADDITIONAL TESTS:    < from: Xray Chest 1 View- PORTABLE-Urgent (10.01.23 @ 03:20) >  IMPRESSION: Cardiomegaly and increased interstitial markings may   represent acute and/or chronic vascular congestion    < end of copied text >      < from: CT Angio Neck Stroke Protocol w/ IV Cont (10.01.23 @ 01:54) >    IMPRESSION:    CTA head/neck: No proximal large vessel occlusion, high-grade stenosis,   or dissection.    CT perfusion: No core infarct identified. Rapid analysis shows 6 cc of   decreased perfusion in the right frontal and left occipital region, which   may be artifactual.    MRI may be considered for more sensitive evaluation for acute infarction   if symptoms persist.    A few patchy groundglass opacities in the right greater than left lung   apices, nonspecific, possibly infectious, correlate clinically.    < end of copied text >    < from: TTE Echo Complete w/ Contrast w/ Doppler (06.16.22 @ 14:15) >  Summary:   1. Severely increased left ventricular internal cavity size.   2. Severely decreased global left ventricular systolic function.   3. Left ventricular ejection fraction, by visual estimation, is <20%.   4. Dilated cardiomyopathy.   5. Moderately enlarged right ventricle.   6. Mildly increased RV wall thickness.   7. Moderately reduced RV systolic function.   8. Moderate to severe right atrial enlargement.   9. Severely enlarged left atrium.  10. Mild thickening of the anterior and posterior mitral valve leaflets.  11. Moderate to severe mitral valve regurgitation.  12. Moderate tricuspid regurgitation.  13. Normal trileaflet aortic valve with normal opening.  14. Estimated pulmonary artery systolic pressure is 38.2 mmHg assuming a   right atrial pressure of 8 mmHg, which is consistent with borderline   pulmonary hypertension.  15. There is no evidence of pericardial effusion.  16. Endocardial visualization was enhanced with intravenous echo contrast.    < end of copied text >      MEDICATIONS  (STANDING):  aspirin enteric coated 81 milliGRAM(s) Oral daily  atorvastatin 40 milliGRAM(s) Oral at bedtime  dapagliflozin 10 milliGRAM(s) Oral every 24 hours  enoxaparin Injectable 40 milliGRAM(s) SubCutaneous every 24 hours  famotidine    Tablet 20 milliGRAM(s) Oral two times a day    MEDICATIONS  (PRN):  acetaminophen     Tablet .. 650 milliGRAM(s) Oral every 6 hours PRN Temp greater or equal to 38C (100.4F), Mild Pain (1 - 3)  aluminum hydroxide/magnesium hydroxide/simethicone Suspension 30 milliLiter(s) Oral every 4 hours PRN Dyspepsia  melatonin 3 milliGRAM(s) Oral at bedtime PRN Insomnia  ondansetron Injectable 4 milliGRAM(s) IV Push every 8 hours PRN Nausea and/or Vomiting

## 2023-10-02 NOTE — SPEECH LANGUAGE PATHOLOGY EVALUATION - SLP PERTINENT HISTORY OF CURRENT PROBLEM
Patient is a 53 year old male with PMHx of HTN, CHFrEF, GERD, and HLD who presented to [facility] on 10/1/23 due to 4 episodes of transient slurred speech and difficulty getting the words out. Patient stated that at 8:00 pm on 9/30/23 patient's wife noticed that he was slurry his words. Patient also stated that he was having trouble getting the words out,even though he knew what he wanted to say. Episode lasted around 2-3 minutes. A few minutes later patient experienced another similar episode. Throughout the night patient had another 2 episodes of the exact nature which prompted him to come to the ED. Stroke team called for consult. On presentation patient stated to no longer have any symptoms and had not had an episode since. Denied any weakness or tingling.  Did admit to missing some of his hypertensive medications recently. SBP on admission was 195. States to take ASA 81mg daily.

## 2023-10-02 NOTE — SPEECH LANGUAGE PATHOLOGY EVALUATION - SPECIFY REASON(S)
Problem: Patient Care Overview  Goal: Plan of Care Review  Outcome: Ongoing (interventions implemented as appropriate)  VSS, Twins in NICU, pumping breast every 3 hours. Supportive bra encourgade. Fundus and lochia WDL. LTVAD dry and  intact  . BS+ 4 quads, states flatus, ambulating , voiding and tolerating regular diet. Bonding well with baby. Pain being managed with  percocet, Stated an understanding to POC. AM labs to be drawn       Asess communication skills, r/o CVA

## 2023-10-02 NOTE — PROGRESS NOTE ADULT - ASSESSMENT
52 y/o M w/ PMH of HTN, HFrEF, CKD IIIb, GERD, HLD came to the ED complaining of slurred speech and found to have an acute/subacute infarct on CTH.  Pt admitted for CVA.        Slurred speech due to acute CVA   - CT head as noted above   - MRI and echo pending   - A1c 5.5  - Lipid panel reviewed  - c/w ASA and high intensity statin   - Will discuss if pt would benefit from plavix w/ Neurology   - c/w VS and Neuro checks q4h       HFrEF / HTN   - Clinically euvolemic   - Last echo (06/16/22) EF <20  - Entresto, torsemide and metoprolol on hold in light of permissive HTN but will resume tomorrow   - c/w Farxiga   - Monitor daily weight and i/o      CKD 3   - At baseline   - Monitor renal function       GERD   - Famotidine 20mg bid       VTE ppx: lovenox    Dispo: pending MRI brain and TTE.

## 2023-10-03 DIAGNOSIS — I63.9 CEREBRAL INFARCTION, UNSPECIFIED: ICD-10-CM

## 2023-10-03 LAB
ANION GAP SERPL CALC-SCNC: 11 MMOL/L — SIGNIFICANT CHANGE UP (ref 5–17)
BUN SERPL-MCNC: 18 MG/DL — SIGNIFICANT CHANGE UP (ref 8–20)
CALCIUM SERPL-MCNC: 8.6 MG/DL — SIGNIFICANT CHANGE UP (ref 8.4–10.5)
CHLORIDE SERPL-SCNC: 101 MMOL/L — SIGNIFICANT CHANGE UP (ref 96–108)
CO2 SERPL-SCNC: 27 MMOL/L — SIGNIFICANT CHANGE UP (ref 22–29)
CREAT SERPL-MCNC: 1.46 MG/DL — HIGH (ref 0.5–1.3)
EGFR: 58 ML/MIN/1.73M2 — LOW
GLUCOSE SERPL-MCNC: 88 MG/DL — SIGNIFICANT CHANGE UP (ref 70–99)
HCT VFR BLD CALC: 43.5 % — SIGNIFICANT CHANGE UP (ref 39–50)
HGB BLD-MCNC: 14.1 G/DL — SIGNIFICANT CHANGE UP (ref 13–17)
MCHC RBC-ENTMCNC: 24.3 PG — LOW (ref 27–34)
MCHC RBC-ENTMCNC: 32.4 GM/DL — SIGNIFICANT CHANGE UP (ref 32–36)
MCV RBC AUTO: 75 FL — LOW (ref 80–100)
PLATELET # BLD AUTO: 337 K/UL — SIGNIFICANT CHANGE UP (ref 150–400)
POTASSIUM SERPL-MCNC: 3.9 MMOL/L — SIGNIFICANT CHANGE UP (ref 3.5–5.3)
POTASSIUM SERPL-SCNC: 3.9 MMOL/L — SIGNIFICANT CHANGE UP (ref 3.5–5.3)
RBC # BLD: 5.8 M/UL — SIGNIFICANT CHANGE UP (ref 4.2–5.8)
RBC # FLD: 15.9 % — HIGH (ref 10.3–14.5)
SODIUM SERPL-SCNC: 139 MMOL/L — SIGNIFICANT CHANGE UP (ref 135–145)
WBC # BLD: 6.53 K/UL — SIGNIFICANT CHANGE UP (ref 3.8–10.5)
WBC # FLD AUTO: 6.53 K/UL — SIGNIFICANT CHANGE UP (ref 3.8–10.5)

## 2023-10-03 PROCEDURE — 99233 SBSQ HOSP IP/OBS HIGH 50: CPT

## 2023-10-03 PROCEDURE — 99222 1ST HOSP IP/OBS MODERATE 55: CPT | Mod: 25

## 2023-10-03 PROCEDURE — 93306 TTE W/DOPPLER COMPLETE: CPT | Mod: 26

## 2023-10-03 PROCEDURE — 99232 SBSQ HOSP IP/OBS MODERATE 35: CPT

## 2023-10-03 RX ORDER — METOPROLOL TARTRATE 50 MG
50 TABLET ORAL DAILY
Refills: 0 | Status: DISCONTINUED | OUTPATIENT
Start: 2023-10-03 | End: 2023-10-04

## 2023-10-03 RX ORDER — CLOPIDOGREL BISULFATE 75 MG/1
75 TABLET, FILM COATED ORAL DAILY
Refills: 0 | Status: DISCONTINUED | OUTPATIENT
Start: 2023-10-03 | End: 2023-10-04

## 2023-10-03 RX ORDER — SACUBITRIL AND VALSARTAN 24; 26 MG/1; MG/1
1 TABLET, FILM COATED ORAL
Refills: 0 | Status: DISCONTINUED | OUTPATIENT
Start: 2023-10-03 | End: 2023-10-04

## 2023-10-03 RX ORDER — ATORVASTATIN CALCIUM 80 MG/1
80 TABLET, FILM COATED ORAL AT BEDTIME
Refills: 0 | Status: DISCONTINUED | OUTPATIENT
Start: 2023-10-03 | End: 2023-10-04

## 2023-10-03 RX ADMIN — FAMOTIDINE 20 MILLIGRAM(S): 10 INJECTION INTRAVENOUS at 05:57

## 2023-10-03 RX ADMIN — DAPAGLIFLOZIN 10 MILLIGRAM(S): 10 TABLET, FILM COATED ORAL at 08:53

## 2023-10-03 RX ADMIN — CLOPIDOGREL BISULFATE 75 MILLIGRAM(S): 75 TABLET, FILM COATED ORAL at 16:35

## 2023-10-03 RX ADMIN — Medication 20 MILLIGRAM(S): at 08:53

## 2023-10-03 RX ADMIN — Medication 81 MILLIGRAM(S): at 08:53

## 2023-10-03 RX ADMIN — FAMOTIDINE 20 MILLIGRAM(S): 10 INJECTION INTRAVENOUS at 16:35

## 2023-10-03 RX ADMIN — SACUBITRIL AND VALSARTAN 1 TABLET(S): 24; 26 TABLET, FILM COATED ORAL at 16:36

## 2023-10-03 RX ADMIN — SACUBITRIL AND VALSARTAN 1 TABLET(S): 24; 26 TABLET, FILM COATED ORAL at 08:53

## 2023-10-03 RX ADMIN — ATORVASTATIN CALCIUM 80 MILLIGRAM(S): 80 TABLET, FILM COATED ORAL at 21:51

## 2023-10-03 RX ADMIN — Medication 50 MILLIGRAM(S): at 08:55

## 2023-10-03 NOTE — CONSULT NOTE ADULT - ASSESSMENT
54 y/o male with PMH of HTN, CHFrEF, GERD, HLD came to the ED complaining of slurred speech.   Symptom started around 8PM while he was eating dinner with his significant other; it happened for a few second but happened again; 4 episodes in total.    He has no other associated symptoms.   CT negative for acute sroke or hemorrhage.   MRI Brain revealed acute left insular infarct.   Cardiac consult called for stroke etiology  Deniers headache, dizziness, no blurry vision, chest pain, palpitations, sob, pnd, orthopnea, fever, chills, syncope, presyncope, weakness, numbness/tingling, n/v/d/c/ abdominal pain, change in bowel/urinary habit, recent travel, sick contacts.      52 y/o male with PMH of HTN, CHFrEF, GERD, HLD came to the ED complaining of slurred speech.   Symptom started around 8PM while he was eating dinner with his significant other; it happened for a few second but happened again; 4 episodes in total.    He has no other associated symptoms.   CT negative for acute sroke or hemorrhage.   MRI Brain revealed acute left insular infarct.   Cardiac consult called for stroke etiology  Deniers headache, dizziness, no blurry vision, chest pain, palpitations, sob, pnd, orthopnea, fever, chills, syncope, presyncope, weakness, numbness/tingling, n/v/d/c/ abdominal pain, change in bowel/urinary habit, recent travel, sick contacts.             WAIT FOR MD FOR FINAL RECOMENDATIONS

## 2023-10-03 NOTE — CONSULT NOTE ADULT - PROBLEM SELECTOR RECOMMENDATION 9
Presents with dysphagia x 4 episodes.  CT negative, MRI + for Acute left insular infarct.    Cardiology called for stroke etilogy  Echo: TTE 6/16/22: LVEF <20%, LVIDd 6.85cm, moderate RVE with moderately reduced RV systolic function, severe biatrial enlargement, moderate to sever MR, moderate TR, PASP 38.2mmHg (RAP 8).  ECHO TTE 10/3/2023 LVEF <20%,  Mildly reduced RV systolic function, Moderately enlarged right ventricle, Moderate mitral valve regurgitation,  Mild-moderate tricuspid regurgitation.  SBP goal 120-150 mmHg. as recommended by neuro  Avoid rapid fluctuations and hypotension.  ANTITHROMBOTIC THERAPY as recommended by neuro   ASA 81mg PO daily, Clopidogrel 75 mg PO daily x 21 days   titrate statin to LDL goal less than 70. LDL is 80. Suggest high intensity statin as long as no medical contraindications  JONNA tomorrow - patient with wife at side is agreeable and is able to swallow.  NPO tonight after midnight.  Loop before discharge

## 2023-10-03 NOTE — PROGRESS NOTE ADULT - SUBJECTIVE AND OBJECTIVE BOX
Preliminary note, offical recommendations pending attending review/signature                                  [facility] Stroke Team  CC: slurred speech and word finding difficulty  HPI:  Patient is a 53 year old male with PMHx of HTN, CHFrEF, GERD, and HLD who presented to [facility] on 10/1/23 due to 4 episodes of transient slurred speech and difficulty getting the words out. Patient stated that at 8:00 pm on 9/30/23 patient's wife noticed that he was slurry his words. Patient also stated that he was having trouble getting the words out,even though he knew what he wanted to say. Episode lasted around 2-3 minutes. A few minutes later patient experienced another similar episode. Throughout the night patient had another 2 episodes of the exact nature which prompted him to come to the ED. Stroke team called for consult. On presentation patient stated to no longer have any symptoms and had not had an episode since. Denied any weakness or tingling.  Did admit to missing some of his hypertensive medications recently. SBP on admission was 195. States to take ASA 81mg daily.                 SUBJECTIVE: No events overnight.  No new neurologic complaints.  ROS reported negative unless otherwise noted.    acetaminophen     Tablet .. 650 milliGRAM(s) Oral every 6 hours PRN  aluminum hydroxide/magnesium hydroxide/simethicone Suspension 30 milliLiter(s) Oral every 4 hours PRN  aspirin enteric coated 81 milliGRAM(s) Oral daily  atorvastatin 40 milliGRAM(s) Oral at bedtime  dapagliflozin 10 milliGRAM(s) Oral every 24 hours  enoxaparin Injectable 40 milliGRAM(s) SubCutaneous every 24 hours  famotidine    Tablet 20 milliGRAM(s) Oral two times a day  melatonin 3 milliGRAM(s) Oral at bedtime PRN  metoprolol succinate ER 50 milliGRAM(s) Oral daily  ondansetron Injectable 4 milliGRAM(s) IV Push every 8 hours PRN  sacubitril 49 mG/valsartan 51 mG 1 Tablet(s) Oral two times a day  torsemide 20 milliGRAM(s) Oral daily      PHYSICAL EXAM:   Vital Signs Last 24 Hrs  T(C): 36.9 (03 Oct 2023 05:35), Max: 37 (02 Oct 2023 23:33)  T(F): 98.4 (03 Oct 2023 05:35), Max: 98.6 (02 Oct 2023 23:33)  HR: 110 (03 Oct 2023 05:35) (100 - 110)  BP: 140/110 (03 Oct 2023 07:22) (126/76 - 162/101)  BP(mean): --  RR: 18 (03 Oct 2023 05:35) (18 - 18)  SpO2: 96% (03 Oct 2023 05:35) (96% - 98%)    Parameters below as of 02 Oct 2023 23:33  Patient On (Oxygen Delivery Method): room air        General: No acute distress    NEUROLOGICAL EXAM:  Mental status: Awake, alert, oriented x3, speech fluent, follows commands, no neglect, normal memory   Cranial Nerves: No facial asymmetry, no nystagmus, no dysarthria,  tongue midline  Motor exam: Normal tone, no drift, 5/5 RUE, 5/5 RLE, 5/5 LUE, 5/5 LLE, normal fine finger movements.  Sensation: Intact to light touch   Coordination/ Gait: No dysmetria, gait not tested    LABS:                        14.1   6.53  )-----------( 337      ( 03 Oct 2023 06:27 )             43.5    10-03    139  |  101  |  18.0  ----------------------------<  88  3.9   |  27.0  |  1.46<H>    Ca    8.6      03 Oct 2023 06:27  Mg     2.3     10-02    TPro  6.4<L>  /  Alb  3.1<L>  /  TBili  0.4  /  DBili  x   /  AST  20  /  ALT  25  /  AlkPhos  66  10-02        IMAGING: Reviewed by me.            INCOMPLETE    Preliminary note, official recommendations pending attending review/signature                                  [facility] Stroke Team  CC: slurred speech and word finding difficulty  HPI:  Patient is a 53 year old male with PMHx of HTN, CHFrEF, GERD, and HLD who presented to [facility] on 10/1/23 due to 4 episodes of transient slurred speech and difficulty getting the words out. Patient stated that at 8:00 pm on 9/30/23 patient's wife noticed that he was slurry his words. Patient also stated that he was having trouble getting the words out,even though he knew what he wanted to say. Episode lasted around 2-3 minutes. A few minutes later patient experienced another similar episode. Throughout the night patient had another 2 episodes of the exact nature which prompted him to come to the ED. Stroke team called for consult. On presentation patient stated to no longer have any symptoms and had not had an episode since. Denied any weakness or tingling.  Did admit to missing some of his hypertensive medications recently. SBP on admission was 195. States to take ASA 81mg daily.     SUBJECTIVE: No events overnight.  No new neurologic complaints.  ROS reported negative unless otherwise noted.    acetaminophen     Tablet .. 650 milliGRAM(s) Oral every 6 hours PRN  aluminum hydroxide/magnesium hydroxide/simethicone Suspension 30 milliLiter(s) Oral every 4 hours PRN  aspirin enteric coated 81 milliGRAM(s) Oral daily  atorvastatin 40 milliGRAM(s) Oral at bedtime  dapagliflozin 10 milliGRAM(s) Oral every 24 hours  enoxaparin Injectable 40 milliGRAM(s) SubCutaneous every 24 hours  famotidine    Tablet 20 milliGRAM(s) Oral two times a day  melatonin 3 milliGRAM(s) Oral at bedtime PRN  metoprolol succinate ER 50 milliGRAM(s) Oral daily  ondansetron Injectable 4 milliGRAM(s) IV Push every 8 hours PRN  sacubitril 49 mG/valsartan 51 mG 1 Tablet(s) Oral two times a day  torsemide 20 milliGRAM(s) Oral daily      PHYSICAL EXAM:   Vital Signs Last 24 Hrs  T(C): 36.9 (03 Oct 2023 05:35), Max: 37 (02 Oct 2023 23:33)  T(F): 98.4 (03 Oct 2023 05:35), Max: 98.6 (02 Oct 2023 23:33)  HR: 110 (03 Oct 2023 05:35) (100 - 110)  BP: 140/110 (03 Oct 2023 07:22) (126/76 - 162/101)  BP(mean): --  RR: 18 (03 Oct 2023 05:35) (18 - 18)  SpO2: 96% (03 Oct 2023 05:35) (96% - 98%)    Parameters below as of 02 Oct 2023 23:33  Patient On (Oxygen Delivery Method): room air        General: INCOMPLETE    Detailed Neurologic Exam:    Mental status: The patient is awake and alert and has normal attention span.  The patient is fully oriented to self, place, year, and month. The patient is able to name objects and their function, follow commands, and repeat sentences.    Cranial nerves: No dysarthria noted Pupils equal and react symmetrically to light. There is no visual field deficit to confrontation. Extraocular motion is full with no nystagmus. There is no ptosis. Facial sensation is intact. Facial musculature is symmetric. Tongue is midline.    Motor: There is normal bulk and tone.  There is no tremor.  Strength is 5/5 in the right arm and leg.   Strength is 5/5 in the left arm and leg.    Sensation: Intact to light touch and pin in 4 extremities    Cerebellar: There is no dysmetria on finger to nose testing.    Gait : deferred            LABS:                        14.1   6.53  )-----------( 337      ( 03 Oct 2023 06:27 )             43.5    10-03    139  |  101  |  18.0  ----------------------------<  88  3.9   |  27.0  |  1.46<H>    Ca    8.6      03 Oct 2023 06:27  Mg     2.3     10-02    TPro  6.4<L>  /  Alb  3.1<L>  /  TBili  0.4  /  DBili  x   /  AST  20  /  ALT  25  /  AlkPhos  66  10-02      Lipid panel: Lipid Profile (10.01.23 @ 01:58)    Cholesterol: 127 mg/dL   Triglycerides, Serum: 43: Lipemic. Interpret with caution mg/dL   HDL Cholesterol: 38 mg/dL   Non HDL Cholesterol: 89   LDL Cholesterol Calculated: 80 mg/dL    HgbA1c: A1C with Estimated Average Glucose (10.01.23 @ 01:58)    A1C with Estimated Average Glucose Result: 5.5 %   Estimated Average Glucose: 111 mg/dL        RADIOLOGY & ADDITIONAL STUDIES (independently reviewed unless otherwise noted):    MR Head No Cont (10.02.23 @ 21:38)   Acute left insular infarct. No acute intracranial hemorrhage.    CT Brain Stroke Protocol (10.01.23 @ 01:47)   IMPRESSION:  No acute intracranial hemorrhage.  Focal hypoattenuation in the region of the left frontal operculum, which   may reflect acute or subacute infarct.  Consider MRI for further assessment if not contraindicated.    CT Angio Neck Stroke Protocol w/ IV Cont (10.01.23 @ 01:54)   IMPRESSION:  CTA head/neck: No proximal large vessel occlusion, high-grade stenosis,   or dissection.  CT perfusion: No core infarct identified. Rapid analysis shows 6 cc of   decreased perfusion in the right frontal and left occipital region, which   may be artifactual.  MRI may be considered for more sensitive evaluation for acute infarction   if symptoms persist.  A few patchy groundglass opacities in the right greater than left lung   apices, nonspecific, possibly infectious, correlate clinically.                 INCOMPLETE    Preliminary note, official recommendations pending attending review/signature     [facility] Stroke Team    CC: slurred speech and word finding difficulty  HPI:  Patient is a 53 year old male with PMHx of HTN, CHFrEF, GERD, and HLD who presented to [facility] on 10/1/23 due to 4 episodes of transient slurred speech and difficulty getting the words out. Patient stated that at 8:00 pm on 9/30/23 patient's wife noticed that he was slurry his words. Patient also stated that he was having trouble getting the words out,even though he knew what he wanted to say. Episode lasted around 2-3 minutes. A few minutes later patient experienced another similar episode. Throughout the night patient had another 2 episodes of the exact nature which prompted him to come to the ED. Stroke team called for consult. On presentation patient stated to no longer have any symptoms and had not had an episode since. Denied any weakness or tingling.  Did admit to missing some of his hypertensive medications recently. SBP on admission was 195. States to take ASA 81mg daily.     SUBJECTIVE: No events overnight.  No new neurologic complaints.  ROS reported negative unless otherwise noted.    acetaminophen     Tablet .. 650 milliGRAM(s) Oral every 6 hours PRN  aluminum hydroxide/magnesium hydroxide/simethicone Suspension 30 milliLiter(s) Oral every 4 hours PRN  aspirin enteric coated 81 milliGRAM(s) Oral daily  atorvastatin 40 milliGRAM(s) Oral at bedtime  dapagliflozin 10 milliGRAM(s) Oral every 24 hours  enoxaparin Injectable 40 milliGRAM(s) SubCutaneous every 24 hours  famotidine    Tablet 20 milliGRAM(s) Oral two times a day  melatonin 3 milliGRAM(s) Oral at bedtime PRN  metoprolol succinate ER 50 milliGRAM(s) Oral daily  ondansetron Injectable 4 milliGRAM(s) IV Push every 8 hours PRN  sacubitril 49 mG/valsartan 51 mG 1 Tablet(s) Oral two times a day  torsemide 20 milliGRAM(s) Oral daily      PHYSICAL EXAM:   Vital Signs Last 24 Hrs  T(C): 36.9 (03 Oct 2023 05:35), Max: 37 (02 Oct 2023 23:33)  T(F): 98.4 (03 Oct 2023 05:35), Max: 98.6 (02 Oct 2023 23:33)  HR: 110 (03 Oct 2023 05:35) (100 - 110)  BP: 140/110 (03 Oct 2023 07:22) (126/76 - 162/101)  BP(mean): --  RR: 18 (03 Oct 2023 05:35) (18 - 18)  SpO2: 96% (03 Oct 2023 05:35) (96% - 98%)    Parameters below as of 02 Oct 2023 23:33  Patient On (Oxygen Delivery Method): room air        General: INCOMPLETE    Detailed Neurologic Exam:    Mental status: The patient is awake and alert and has normal attention span.  The patient is fully oriented to self, place, year, and month. The patient is able to name objects and their function, follow commands, and repeat sentences.    Cranial nerves: No dysarthria noted Pupils equal and react symmetrically to light. There is no visual field deficit to confrontation. Extraocular motion is full with no nystagmus. There is no ptosis. Facial sensation is intact. Facial musculature is symmetric. Tongue is midline.    Motor: There is normal bulk and tone.  There is no tremor.  Strength is 5/5 in the right arm and leg.   Strength is 5/5 in the left arm and leg.    Sensation: Intact to light touch and pin in 4 extremities    Cerebellar: There is no dysmetria on finger to nose testing.    Gait : deferred            LABS:                        14.1   6.53  )-----------( 337      ( 03 Oct 2023 06:27 )             43.5    10-03    139  |  101  |  18.0  ----------------------------<  88  3.9   |  27.0  |  1.46<H>    Ca    8.6      03 Oct 2023 06:27  Mg     2.3     10-02    TPro  6.4<L>  /  Alb  3.1<L>  /  TBili  0.4  /  DBili  x   /  AST  20  /  ALT  25  /  AlkPhos  66  10-02      Lipid panel: Lipid Profile (10.01.23 @ 01:58)    Cholesterol: 127 mg/dL   Triglycerides, Serum: 43: Lipemic. Interpret with caution mg/dL   HDL Cholesterol: 38 mg/dL   Non HDL Cholesterol: 89   LDL Cholesterol Calculated: 80 mg/dL    HgbA1c: A1C with Estimated Average Glucose (10.01.23 @ 01:58)    A1C with Estimated Average Glucose Result: 5.5 %   Estimated Average Glucose: 111 mg/dL        RADIOLOGY & ADDITIONAL STUDIES (independently reviewed unless otherwise noted):    MR Head No Cont (10.02.23 @ 21:38)   Acute left insular infarct. No acute intracranial hemorrhage.    CT Brain Stroke Protocol (10.01.23 @ 01:47)   IMPRESSION:  No acute intracranial hemorrhage.  Focal hypoattenuation in the region of the left frontal operculum, which   may reflect acute or subacute infarct.  Consider MRI for further assessment if not contraindicated.    CT Angio Neck Stroke Protocol w/ IV Cont (10.01.23 @ 01:54)   IMPRESSION:  CTA head/neck: No proximal large vessel occlusion, high-grade stenosis,   or dissection.  CT perfusion: No core infarct identified. Rapid analysis shows 6 cc of   decreased perfusion in the right frontal and left occipital region, which   may be artifactual.  MRI may be considered for more sensitive evaluation for acute infarction   if symptoms persist.  A few patchy groundglass opacities in the right greater than left lung   apices, nonspecific, possibly infectious, correlate clinically.                 INCOMPLETE    Preliminary note, official recommendations pending attending review/signature     [facility] Stroke Team    CC: slurred speech and word finding difficulty  HPI:  Patient is a 53 year old male with PMHx of HTN, CHFrEF, GERD, and HLD who presented to [facility] on 10/1/23 due to 4 episodes of transient slurred speech and difficulty getting the words out. Patient stated that at 8:00 pm on 9/30/23 patient's wife noticed that he was slurry his words. Patient also stated that he was having trouble getting the words out,even though he knew what he wanted to say. Episode lasted around 2-3 minutes. A few minutes later patient experienced another similar episode. Throughout the night patient had another 2 episodes of the exact nature which prompted him to come to the ED. Stroke team called for consult. On presentation patient stated to no longer have any symptoms and had not had an episode since. Denied any weakness or tingling.  Did admit to missing some of his hypertensive medications recently. SBP on admission was 195. States to take ASA 81mg daily.     SUBJECTIVE: No events overnight.  No new neurologic complaints.  ROS reported negative unless otherwise noted.    acetaminophen     Tablet .. 650 milliGRAM(s) Oral every 6 hours PRN  aluminum hydroxide/magnesium hydroxide/simethicone Suspension 30 milliLiter(s) Oral every 4 hours PRN  aspirin enteric coated 81 milliGRAM(s) Oral daily  atorvastatin 40 milliGRAM(s) Oral at bedtime  dapagliflozin 10 milliGRAM(s) Oral every 24 hours  enoxaparin Injectable 40 milliGRAM(s) SubCutaneous every 24 hours  famotidine    Tablet 20 milliGRAM(s) Oral two times a day  melatonin 3 milliGRAM(s) Oral at bedtime PRN  metoprolol succinate ER 50 milliGRAM(s) Oral daily  ondansetron Injectable 4 milliGRAM(s) IV Push every 8 hours PRN  sacubitril 49 mG/valsartan 51 mG 1 Tablet(s) Oral two times a day  torsemide 20 milliGRAM(s) Oral daily      PHYSICAL EXAM:   Vital Signs Last 24 Hrs  T(C): 36.9 (03 Oct 2023 05:35), Max: 37 (02 Oct 2023 23:33)  T(F): 98.4 (03 Oct 2023 05:35), Max: 98.6 (02 Oct 2023 23:33)  HR: 110 (03 Oct 2023 05:35) (100 - 110)  BP: 140/110 (03 Oct 2023 07:22) (126/76 - 162/101)  BP(mean): --  RR: 18 (03 Oct 2023 05:35) (18 - 18)  SpO2: 96% (03 Oct 2023 05:35) (96% - 98%)    Parameters below as of 02 Oct 2023 23:33  Patient On (Oxygen Delivery Method): room air        General: INCOMPLETE    Detailed Neurologic Exam:    Mental status: The patient is awake and alert and has normal attention span.  The patient is fully oriented to self, place, year, and month. The patient is able to name objects and their function, follow commands, and repeat sentences.    Cranial nerves: No dysarthria noted Pupils equal and react symmetrically to light. There is no visual field deficit to confrontation. Extraocular motion is full with no nystagmus. There is no ptosis. Facial sensation is intact. Facial musculature is symmetric. Tongue is midline.    Motor: There is normal bulk and tone.  There is no tremor.  Strength is 5/5 in the right arm and leg.   Strength is 5/5 in the left arm and leg.    Sensation: Intact to light touch and pin in 4 extremities    Cerebellar: There is no dysmetria on finger to nose testing.    Gait : deferred            LABS:                        14.1   6.53  )-----------( 337      ( 03 Oct 2023 06:27 )             43.5    10-03    139  |  101  |  18.0  ----------------------------<  88  3.9   |  27.0  |  1.46<H>    Ca    8.6      03 Oct 2023 06:27  Mg     2.3     10-02    TPro  6.4<L>  /  Alb  3.1<L>  /  TBili  0.4  /  DBili  x   /  AST  20  /  ALT  25  /  AlkPhos  66  10-02      Lipid panel: Lipid Profile (10.01.23 @ 01:58)    Cholesterol: 127 mg/dL   Triglycerides, Serum: 43: Lipemic. Interpret with caution mg/dL   HDL Cholesterol: 38 mg/dL   Non HDL Cholesterol: 89   LDL Cholesterol Calculated: 80 mg/dL    HgbA1c: A1C with Estimated Average Glucose (10.01.23 @ 01:58)    A1C with Estimated Average Glucose Result: 5.5 %   Estimated Average Glucose: 111 mg/dL        RADIOLOGY & ADDITIONAL STUDIES (independently reviewed unless otherwise noted):    MR Head No Cont (10.02.23 @ 21:38)   Acute left insular infarct. No acute intracranial hemorrhage.    CT Brain Stroke Protocol (10.01.23 @ 01:47)   IMPRESSION:  No acute intracranial hemorrhage.  Focal hypoattenuation in the region of the left frontal operculum, which   may reflect acute or subacute infarct.  Consider MRI for further assessment if not contraindicated.    CT Angio Neck Stroke Protocol w/ IV Cont (10.01.23 @ 01:54)   IMPRESSION:  CTA head/neck: No proximal large vessel occlusion, high-grade stenosis,   or dissection.  CT perfusion: No core infarct identified. Rapid analysis shows 6 cc of   decreased perfusion in the right frontal and left occipital region, which   may be artifactual.  MRI may be considered for more sensitive evaluation for acute infarction   if symptoms persist.  A few patchy groundglass opacities in the right greater than left lung   apices, nonspecific, possibly infectious, correlate clinically.      TTE Echo Complete w/ Contrast w/ Doppler (06.16.22 @ 14:15) *********************    Summary:   1. Severely increased left ventricular internal cavity size.   2. Severely decreased global left ventricular systolic function.   3. Left ventricular ejection fraction, by visual estimation, is <20%.   4. Dilated cardiomyopathy.   5. Moderately enlarged right ventricle.   6. Mildly increased RV wall thickness.   7. Moderately reduced RV systolic function.   8. Moderate to severe right atrial enlargement.   9. Severely enlarged left atrium.  10. Mild thickening of the anterior and posterior mitral valve leaflets.  11. Moderate to severe mitral valve regurgitation.  12. Moderate tricuspid regurgitation.  13. Normal trileaflet aortic valve with normal opening.  14. Estimated pulmonary artery systolic pressure is 38.2 mmHg assuming a   right atrial pressure of 8 mmHg, which is consistent with borderline   pulmonary hypertension.  15. There is no evidence of pericardial effusion.  16. Endocardial visualization was enhanced with intravenous echo contrast.             Preliminary note, official recommendations pending attending review/signature     [facility] Stroke Team    CC: slurred speech and word finding difficulty  HPI:  Patient is a 53 year old male with PMHx of HTN, CHFrEF, GERD, and HLD who presented to [facility] on 10/1/23 due to 4 episodes of transient slurred speech and difficulty getting the words out. Patient stated that at 8:00 pm on 9/30/23 patient's wife noticed that he was slurry his words. Patient also stated that he was having trouble getting the words out,even though he knew what he wanted to say. Episode lasted around 2-3 minutes. A few minutes later patient experienced another similar episode. Throughout the night patient had another 2 episodes of the exact nature which prompted him to come to the ED. Stroke team called for consult. On presentation patient stated to no longer have any symptoms and had not had an episode since. Denied any weakness or tingling.  Did admit to missing some of his hypertensive medications recently. SBP on admission was 195. States to take ASA 81mg daily.     SUBJECTIVE: No events overnight.  No new neurologic complaints.  ROS reported negative unless otherwise noted.    acetaminophen     Tablet .. 650 milliGRAM(s) Oral every 6 hours PRN  aluminum hydroxide/magnesium hydroxide/simethicone Suspension 30 milliLiter(s) Oral every 4 hours PRN  aspirin enteric coated 81 milliGRAM(s) Oral daily  atorvastatin 40 milliGRAM(s) Oral at bedtime  dapagliflozin 10 milliGRAM(s) Oral every 24 hours  enoxaparin Injectable 40 milliGRAM(s) SubCutaneous every 24 hours  famotidine    Tablet 20 milliGRAM(s) Oral two times a day  melatonin 3 milliGRAM(s) Oral at bedtime PRN  metoprolol succinate ER 50 milliGRAM(s) Oral daily  ondansetron Injectable 4 milliGRAM(s) IV Push every 8 hours PRN  sacubitril 49 mG/valsartan 51 mG 1 Tablet(s) Oral two times a day  torsemide 20 milliGRAM(s) Oral daily      PHYSICAL EXAM:   Vital Signs Last 24 Hrs  T(C): 36.9 (03 Oct 2023 05:35), Max: 37 (02 Oct 2023 23:33)  T(F): 98.4 (03 Oct 2023 05:35), Max: 98.6 (02 Oct 2023 23:33)  HR: 110 (03 Oct 2023 05:35) (100 - 110)  BP: 140/110 (03 Oct 2023 07:22) (126/76 - 162/101)  BP(mean): --  RR: 18 (03 Oct 2023 05:35) (18 - 18)  SpO2: 96% (03 Oct 2023 05:35) (96% - 98%)    Parameters below as of 02 Oct 2023 23:33  Patient On (Oxygen Delivery Method): room air        General: appears slightly anxious, like to move on with necessary testing today and go home. Fiance is at the bedside.      Detailed Neurologic Exam:    Mental status: The patient is awake and alert and has normal attention span.  The patient is fully oriented to self, place, year, and month. The patient is able to name objects and their function, follow commands, and repeat sentences.    Cranial nerves: No dysarthria noted Pupils equal and react symmetrically to light. There is no visual field deficit to confrontation. Extraocular motion is full with no nystagmus. There is no ptosis. Facial sensation is intact. Facial musculature is symmetric. Tongue is midline.    Motor: There is normal bulk and tone.  There is no tremor.  Strength is 5/5 in the right arm and leg, no drift  Strength is 5/5 in the left arm and leg, no drift    Sensation: Intact to light touch in 4 extremities    Cerebellar: There is no dysmetria on finger to nose testing.    Gait : deferred            LABS:                        14.1   6.53  )-----------( 337      ( 03 Oct 2023 06:27 )             43.5    10-03    139  |  101  |  18.0  ----------------------------<  88  3.9   |  27.0  |  1.46<H>    Ca    8.6      03 Oct 2023 06:27  Mg     2.3     10-02    TPro  6.4<L>  /  Alb  3.1<L>  /  TBili  0.4  /  DBili  x   /  AST  20  /  ALT  25  /  AlkPhos  66  10-02      Lipid panel: Lipid Profile (10.01.23 @ 01:58)    Cholesterol: 127 mg/dL   Triglycerides, Serum: 43: Lipemic. Interpret with caution mg/dL   HDL Cholesterol: 38 mg/dL   Non HDL Cholesterol: 89   LDL Cholesterol Calculated: 80 mg/dL    HgbA1c: A1C with Estimated Average Glucose (10.01.23 @ 01:58)    A1C with Estimated Average Glucose Result: 5.5 %   Estimated Average Glucose: 111 mg/dL        RADIOLOGY & ADDITIONAL STUDIES (independently reviewed unless otherwise noted):    MR Head No Cont (10.02.23 @ 21:38)   Acute left insular infarct. No acute intracranial hemorrhage.    CT Brain Stroke Protocol (10.01.23 @ 01:47)   IMPRESSION:  No acute intracranial hemorrhage.  Focal hypoattenuation in the region of the left frontal operculum, which   may reflect acute or subacute infarct.  Consider MRI for further assessment if not contraindicated.    CT Angio Neck Stroke Protocol w/ IV Cont (10.01.23 @ 01:54)   IMPRESSION:  CTA head/neck: No proximal large vessel occlusion, high-grade stenosis,   or dissection.  CT perfusion: No core infarct identified. Rapid analysis shows 6 cc of   decreased perfusion in the right frontal and left occipital region, which   may be artifactual.  MRI may be considered for more sensitive evaluation for acute infarction   if symptoms persist.  A few patchy groundglass opacities in the right greater than left lung   apices, nonspecific, possibly infectious, correlate clinically.      TTE Echo Complete w/ Contrast w/ Doppler (06.16.22 @ 14:15) *********************    Summary:   1. Severely increased left ventricular internal cavity size.   2. Severely decreased global left ventricular systolic function.   3. Left ventricular ejection fraction, by visual estimation, is <20%.   4. Dilated cardiomyopathy.   5. Moderately enlarged right ventricle.   6. Mildly increased RV wall thickness.   7. Moderately reduced RV systolic function.   8. Moderate to severe right atrial enlargement.   9. Severely enlarged left atrium.  10. Mild thickening of the anterior and posterior mitral valve leaflets.  11. Moderate to severe mitral valve regurgitation.  12. Moderate tricuspid regurgitation.  13. Normal trileaflet aortic valve with normal opening.  14. Estimated pulmonary artery systolic pressure is 38.2 mmHg assuming a   right atrial pressure of 8 mmHg, which is consistent with borderline   pulmonary hypertension.  15. There is no evidence of pericardial effusion.  16. Endocardial visualization was enhanced with intravenous echo contrast.               [facility] Stroke Team    CC: slurred speech and word finding difficulty  HPI:  Patient is a 53 year old male with PMHx of HTN, CHFrEF, GERD, and HLD who presented to [facility] on 10/1/23 due to 4 episodes of transient slurred speech and difficulty getting the words out. Patient stated that at 8:00 pm on 9/30/23 patient's wife noticed that he was slurry his words. Patient also stated that he was having trouble getting the words out,even though he knew what he wanted to say. Episode lasted around 2-3 minutes. A few minutes later patient experienced another similar episode. Throughout the night patient had another 2 episodes of the exact nature which prompted him to come to the ED. Stroke team called for consult. On presentation patient stated to no longer have any symptoms and had not had an episode since. Denied any weakness or tingling.  Did admit to missing some of his hypertensive medications recently. SBP on admission was 195. States to take ASA 81mg daily.     SUBJECTIVE: No events overnight.  No new neurologic complaints.  ROS reported negative unless otherwise noted.    acetaminophen     Tablet .. 650 milliGRAM(s) Oral every 6 hours PRN  aluminum hydroxide/magnesium hydroxide/simethicone Suspension 30 milliLiter(s) Oral every 4 hours PRN  aspirin enteric coated 81 milliGRAM(s) Oral daily  atorvastatin 40 milliGRAM(s) Oral at bedtime  dapagliflozin 10 milliGRAM(s) Oral every 24 hours  enoxaparin Injectable 40 milliGRAM(s) SubCutaneous every 24 hours  famotidine    Tablet 20 milliGRAM(s) Oral two times a day  melatonin 3 milliGRAM(s) Oral at bedtime PRN  metoprolol succinate ER 50 milliGRAM(s) Oral daily  ondansetron Injectable 4 milliGRAM(s) IV Push every 8 hours PRN  sacubitril 49 mG/valsartan 51 mG 1 Tablet(s) Oral two times a day  torsemide 20 milliGRAM(s) Oral daily      PHYSICAL EXAM:   Vital Signs Last 24 Hrs  T(C): 36.9 (03 Oct 2023 05:35), Max: 37 (02 Oct 2023 23:33)  T(F): 98.4 (03 Oct 2023 05:35), Max: 98.6 (02 Oct 2023 23:33)  HR: 110 (03 Oct 2023 05:35) (100 - 110)  BP: 140/110 (03 Oct 2023 07:22) (126/76 - 162/101)  BP(mean): --  RR: 18 (03 Oct 2023 05:35) (18 - 18)  SpO2: 96% (03 Oct 2023 05:35) (96% - 98%)    Parameters below as of 02 Oct 2023 23:33  Patient On (Oxygen Delivery Method): room air        General: appears slightly anxious, like to move on with necessary testing today and go home. Fiance is at the bedside.      Detailed Neurologic Exam:    Mental status: The patient is awake and alert and has normal attention span.  The patient is fully oriented to self, place, year, and month. The patient is able to name objects and their function, follow commands, and repeat sentences.    Cranial nerves: No dysarthria noted Pupils equal and react symmetrically to light. There is no visual field deficit to confrontation. Extraocular motion is full with no nystagmus. There is no ptosis. Facial sensation is intact. Facial musculature is symmetric. Tongue is midline.    Motor: There is normal bulk and tone.  There is no tremor.  Strength is 5/5 in the right arm and leg, no drift  Strength is 5/5 in the left arm and leg, no drift    Sensation: Intact to light touch in 4 extremities    Cerebellar: There is no dysmetria on finger to nose testing.    Gait : deferred            LABS:                        14.1   6.53  )-----------( 337      ( 03 Oct 2023 06:27 )             43.5    10-03    139  |  101  |  18.0  ----------------------------<  88  3.9   |  27.0  |  1.46<H>    Ca    8.6      03 Oct 2023 06:27  Mg     2.3     10-02    TPro  6.4<L>  /  Alb  3.1<L>  /  TBili  0.4  /  DBili  x   /  AST  20  /  ALT  25  /  AlkPhos  66  10-02      Lipid panel: Lipid Profile (10.01.23 @ 01:58)    Cholesterol: 127 mg/dL   Triglycerides, Serum: 43: Lipemic. Interpret with caution mg/dL   HDL Cholesterol: 38 mg/dL   Non HDL Cholesterol: 89   LDL Cholesterol Calculated: 80 mg/dL    HgbA1c: A1C with Estimated Average Glucose (10.01.23 @ 01:58)    A1C with Estimated Average Glucose Result: 5.5 %   Estimated Average Glucose: 111 mg/dL        RADIOLOGY & ADDITIONAL STUDIES (independently reviewed unless otherwise noted):    MR Head No Cont (10.02.23 @ 21:38)   Acute left insular infarct. No acute intracranial hemorrhage.    CT Brain Stroke Protocol (10.01.23 @ 01:47)   IMPRESSION:  No acute intracranial hemorrhage.  Focal hypoattenuation in the region of the left frontal operculum, which   may reflect acute or subacute infarct.  Consider MRI for further assessment if not contraindicated.    CT Angio Neck Stroke Protocol w/ IV Cont (10.01.23 @ 01:54)   IMPRESSION:  CTA head/neck: No proximal large vessel occlusion, high-grade stenosis,   or dissection.  CT perfusion: No core infarct identified. Rapid analysis shows 6 cc of   decreased perfusion in the right frontal and left occipital region, which   may be artifactual.  MRI may be considered for more sensitive evaluation for acute infarction   if symptoms persist.  A few patchy groundglass opacities in the right greater than left lung   apices, nonspecific, possibly infectious, correlate clinically.      TTE Echo Complete w/ Contrast w/ Doppler (06.16.22 @ 14:15) *********************    Summary:   1. Severely increased left ventricular internal cavity size.   2. Severely decreased global left ventricular systolic function.   3. Left ventricular ejection fraction, by visual estimation, is <20%.   4. Dilated cardiomyopathy.   5. Moderately enlarged right ventricle.   6. Mildly increased RV wall thickness.   7. Moderately reduced RV systolic function.   8. Moderate to severe right atrial enlargement.   9. Severely enlarged left atrium.  10. Mild thickening of the anterior and posterior mitral valve leaflets.  11. Moderate to severe mitral valve regurgitation.  12. Moderate tricuspid regurgitation.  13. Normal trileaflet aortic valve with normal opening.  14. Estimated pulmonary artery systolic pressure is 38.2 mmHg assuming a   right atrial pressure of 8 mmHg, which is consistent with borderline   pulmonary hypertension.  15. There is no evidence of pericardial effusion.  16. Endocardial visualization was enhanced with intravenous echo contrast.

## 2023-10-03 NOTE — PROGRESS NOTE ADULT - ASSESSMENT
52 y/o M w/ PMH of HTN, HFrEF, CKD IIIb, GERD, HLD came to the ED complaining of slurred speech and found to have an acute/subacute infarct on CTH.  Pt admitted for CVA.       Slurred speech due to acute CVA   - CT head as noted above   - MRI reporting acute L-insular infarct    - TTE being done today    - A1c 5.5  - Lipid panel reviewed  - c/w ASA and high intensity statin   - Will start on plavix    - Per discussion neuro will need JONNA likely ILR therefore will consult cardiology    - c/w VS and Neuro checks q4h       HFrEF / HTN   - Clinically euvolemic   - Last echo (06/16/22) EF <20  - Entresto, torsemide and metoprolol resumed today   - c/w Farxiga   - Monitor daily weight and i/o      CKD 3   - At baseline   - Monitor renal function       GERD   - Famotidine 20mg bid       VTE ppx: lovenox    Dispo: Pending JNONA and possible ILR.

## 2023-10-03 NOTE — CONSULT NOTE ADULT - NS ATTEND AMEND GEN_ALL_CORE FT
Patient seen and examined by me.    T(C): 36.8 (10-03-23 @ 16:53), Max: 37 (10-02-23 @ 23:33)  HR: 95 (10-03-23 @ 16:53) (95 - 110)  BP: 137/94 (10-03-23 @ 16:53) (136/95 - 162/101)  RR: 18 (10-03-23 @ 16:53) (18 - 18)  SpO2: 97% (10-03-23 @ 16:53) (96% - 98%)  Patient alert and awake.  Chest- Bilateral Clear BS  Cardiac- S1 and S2  Abdomen- Soft    Assessment/Plan:  1. CVA  2. HFrEF    For JONNA and ILR  I have discussed my recommendation with the PA which are outlined above.  Will sign off

## 2023-10-03 NOTE — CONSULT NOTE ADULT - SUBJECTIVE AND OBJECTIVE BOX
[facility]                                              CARDIOLOGY AT [place] [address]                                             Telephone: [phone]. Fax:[phone]                                                       CARDIOLOGY CONSULTATION NOTE                                                                                             History obtained by: Patient and medical record  Community Cardiologist:   Dr. Borges   obtained: Yes [  ] No [  ] NA  Reason for Consultation:  Stroke Etilogy  Available out pt records reviewed: Yes [ x ] No [  ]    Chief complaint:    Patient is a 53y old  Male who presents with a chief complaint of cva (03 Oct 2023 12:57)      HPI:  52 y/o male with PMH of HTN, CHFrEF, GERD, HLD came to the ED complaining of slurred speech.   Symptom started around 8PM while he was eating dinner with his significant other; it happened for a few second but happened again; 4 episodes in total.    He has no other associated symptoms.   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   Cardiac consult called for stroke etiology  Deniers headache, dizziness, no blurry vision, chest pain, palpitations, sob, pnd, orthopnea, fever, chills, syncope, presyncope, weakness, numbness/tingling, n/v/d/c/ abdominal pain, change in bowel/urinary habit, recent travel, sick contacts.           CARDIAC TESTING   ECHO:  Echo: TTE 22: LVEF <20%, LVIDd 6.85cm, moderate RVE with moderately reduced RV systolic function, severe biatrial enlargement, moderate to sever MR, moderate TR, PASP 38.2mmHg (RAP 8).  ECHO TTE 10/3/2023 LVEF <20%,  Mildly reduced RV systolic function, Moderately enlarged right ventricle, Moderate mitral valve regurgitation,  Mild-moderate tricuspid regurgitation.     PROCEDURE DATE:  10/03/2023    INTERPRETATION:  TRANSTHORACIC ECHOCARDIOGRAM REPORT    Patient Name:   JENNIFER BARRIENTOS Patient Location: Inpatient  Medical Rec #:  [id number]      Accession #:      [id number]  Account #:                     Height:           70.1 in 178.0 cm  YOB: 1970      Weight:           209.4 lb 95.00 kg  Patient Age:    53 years       BSA:              2.13 m²  Patient Gender: M              BP:               162/101 mmHg    Date of Exam:        10/3/2023 11:44:42 AM  Sonographer:         Teagan Alexander  Referring Physician: Stephenie Hahn MD    Procedure:   2D Echo/Doppler/Color Doppler Complete.  Indications: I63.9 - Cerebral infarction, unspecified  Diagnosis:   I63.9 - Cerebral infarction, unspecified    2D AND M-MODE MEASUREMENTS (normal ranges within parentheses):  Left                 Normal   Aorta/Left            Normal  Ventricle:                    Atrium:  IVSd (2D):    0.82  (0.7-1.1) Aortic Root    2.90  (2.4-3.7)                 cm             (2D):           cm  LVPWd (2D):   1.04  (0.7-1.1) Left Atrium    5.00  (1.9-4.0)                 cm             (2D):           cm  LVIDd (2D):   7.36  (3.4-5.7) LA Volume      54.8                 cm             Index         ml/m²  LVIDs (2D):   7.35                 cm  LV FS (2D):   0.1 %  (>25%)  Relative Wall 0.28   (<0.42)  Thickness    SPECTRAL DOPPLER ANALYSIS (where applicable):  Aortic Valve: AoV Max Richie: 1.25 m/s AoV Peak P.2 mmHg AoV Mean PG:   3.0 mmHg    LVOT Vmax: 0.74 m/s LVOT VTI: 0.102 m LVOT Diameter: 2.00 cm    AoV Area, Vmax: 1.86 cm² AoV Area, VTI: 1.72 cm² AoV Area, Vmn: 1.67 cm²  Ao VTI: 0.187  Tricuspid Valve and PA/RV Systolic Pressure: TR Max Velocity: 2.85 m/s RA   Pressure: 3 mmHg RVSP/PASP: 35.5 mmHg    PHYSICIAN INTERPRETATION:  Left Ventricle: Endocardial visualization was enhanced with intravenous   echo contrast. The left ventricular internal cavity size is severely   increased.  Global LV systolic function was severely decreased. Left ventricular   ejection fraction, by visual estimation, is <20%.  Right Ventricle: The right ventricular size is moderately enlarged. RV   systolic function is mildly reduced.  Left Atrium: Severely enlarged left atrium.  Right Atrium: Severely enlarged right atrium.  Pericardium: There is no evidence of pericardial effusion.  Mitral Valve: Structurally normal mitral valve, with normal leaflet   excursion. Moderate mitral valve regurgitation is seen.  Tricuspid Valve: Structurally normal tricuspid valve, with normal leaflet   excursion. Mild-moderate tricuspid regurgitation is visualized. Estimated   pulmonary artery systolic pressure is 35.5 mmHg assuming a right atrial   pressure of 3 mmHg, which is consistent with borderline pulmonary   hypertension.  Aortic Valve: The aortic valve is trileaflet. Sclerotic aortic valve with   normal opening. No evidence of aortic valve regurgitation is seen.  Pulmonic Valve: Structurally normal pulmonic valve, with normal leaflet   excursion. Trace pulmonic valve regurgitation.  Aorta: The aortic root and ascending aorta are structurally normal, with   no evidence of dilitation.  Pulmonary Artery: The main pulmonary artery is normal in size.  Venous: The inferior vena cava is normal. The inferior vena cava was   normal sized, with respiratory size variation greater than 50%. The   inferior vena cava and the hepatic vein show a normal flow pattern.  In comparison to the previous echocardiogram(s): Prior examinations are   available and were reviewed for comparison purposes.    Summary:   1. Left ventricular ejection fraction, by visual estimation, is <20%.   2. Severely decreased global left ventricular systolic function.   3. Severely enlarged left atrium.   4. Severely enlarged right atrium.   5. Mildly reduced RV systolic function.   6. Moderately enlarged right ventricle.   7. Moderate mitral valve regurgitation.   8. Mild-moderate tricuspid regurgitation.   9. Sclerotic aortic valve with normal opening.  10. Estimated pulmonary artery systolic pressure is 35.5 mmHg assuming a   right atrial pressure of 3 mmHg, which is consistent with borderline   pulmonary hypertension.  11. Endocardial visualization was enhanced with intravenous echo contrast.    MD Bryan Electronically signed on 10/3/2023 at 2:43:31 PM              STRESS:    CATH:     ELECTROPHYSIOLOGY:     PAST MEDICAL HISTORY  CHF, chronic  Hypertension  Hyperlipidemia    PAST SURGICAL HISTORY  S/P hernia repair    SOCIAL HISTORY:  Denies smoking/alcohol/drugs  CIGARETTES:     ALCOHOL:  DRUGS:    FAMILY HISTORY:  Family history of stroke (Mother)  Family History of Cardiovascular Disease:  Yes [  ] No [ x ]  Coronary Artery Disease in first degree relative: Yes [  ] No [  x]  Sudden Cardiac Death in First degree relative: Yes [  ] No [ x ]    HOME MEDICATIONS:  aspirin 81 mg oral delayed release tablet: 1 tab(s) orally once a day (15 Braulio 2022 09:14)  Lipitor 40 mg oral tablet: 1 tab(s) orally once a day (15 Braulio 2022 09:14)  metoprolol succinate 50 mg oral tablet, extended release: 1 tab(s) orally once a day (15 Braulio 2022 09:14)  Pepcid 20 mg oral tablet: 1 tab(s) orally 2 times a day (15 Braulio 2022 09:14)  torsemide 20 mg oral tablet: 1 tab(s) orally 2 times a day (01 Oct 2023 05:13)    CURRENT CARDIAC MEDICATIONS:  metoprolol succinate ER 50 milliGRAM(s) Oral daily  sacubitril 49 mG/valsartan 51 mG 1 Tablet(s) Oral two times a day  torsemide 20 milliGRAM(s) Oral daily    CURRENT OTHER MEDICATIONS:  acetaminophen     Tablet .. 650 milliGRAM(s) Oral every 6 hours PRN Temp greater or equal to 38C (100.4F), Mild Pain (1 - 3)  melatonin 3 milliGRAM(s) Oral at bedtime PRN Insomnia  ondansetron Injectable 4 milliGRAM(s) IV Push every 8 hours PRN Nausea and/or Vomiting  aluminum hydroxide/magnesium hydroxide/simethicone Suspension 30 milliLiter(s) Oral every 4 hours PRN Dyspepsia  famotidine    Tablet 20 milliGRAM(s) Oral two times a day  aspirin enteric coated 81 milliGRAM(s) Oral daily  atorvastatin 80 milliGRAM(s) Oral at bedtime  clopidogrel Tablet 75 milliGRAM(s) Oral daily  dapagliflozin 10 milliGRAM(s) Oral every 24 hours  enoxaparin Injectable 40 milliGRAM(s) SubCutaneous every 24 hours    ALLERGIES:   No Known Allergies    REVIEW OF SYMPTOMS:   CONSTITUTIONAL: No fever, no chills, no weight loss, no weight gain, no fatigue   ENMT:  No vertigo; No sinus or throat pain  NECK: No pain or stiffness  CARDIOVASCULAR: No chest pain, no dyspnea, no syncope/presyncope, no palpitations, no dizziness, no Orthopnea, no Paroxsymal nocturnal dyspnea  RESPIRATORY: no Shortness of breath, no cough, no wheezing  : No dysuria, no hematuria   GI: No dark color stool, no nausea, no diarrhea, no constipation, no abdominal pain   NEURO: No headache, no slurred speech   MUSCULOSKELETAL: No joint pain or swelling; No muscle, back, or extremity pain  PSYCH: No agitation, no anxiety.    ALL OTHER REVIEW OF SYSTEMS ARE NEGATIVE.    VITAL SIGNS:  T(C): 36.5 (10-03-23 @ 10:57), Max: 37 (10-02-23 @ 23:33)  T(F): 97.7 (10-03-23 @ 10:57), Max: 98.6 (10-02-23 @ 23:33)  HR: 107 (10-03-23 @ 10:57) (100 - 110)  BP: 140/110 (10-03-23 @ 07:22) (136/95 - 162/101)  RR: 18 (10-03-23 @ 05:35) (18 - 18)  SpO2: 97% (10-03-23 @ 10:57) (96% - 98%)  INTAKE AND OUTPUT:     10-03 @ 07:01  -  10-03 @ 14:51  --------------------------------------------------------  IN: 550 mL / OUT: 0 mL / NET: 550 mL    PHYSICAL EXAM:  Constitutional: Comfortable . No acute distress.   HEENT: Atraumatic and normocephalic , neck is supple . no JVD. No carotid bruit.  CNS: A&Ox3. No focal deficits.   Respiratory: CTAB, unlabored   Cardiovascular: RRR normal s1 s2. No murmur. No rubs or gallop.  Gastrointestinal: Soft, non-tender. +Bowel sounds.   Extremities: + Peripheral Pulses, No clubbing, cyanosis, or edema  Psychiatric: Calm . no agitation.   Skin: Warm and dry, no ulcers on extremities     LABS:  ( 01 Oct 2023 01:58 )  Troponin T  <0.01,  CPK  X    , CKMB  X    , BNP X                            14.1   6.53  )-----------( 337      ( 03 Oct 2023 06:27 )             43.5     10-03    139  |  101  |  18.0  ----------------------------<  88  3.9   |  27.0  |  1.46<H>    Ca    8.6      03 Oct 2023 06:27  Mg     2.3     10-02    TPro  6.4<L>  /  Alb  3.1<L>  /  TBili  0.4  /  DBili  x   /  AST  20  /  ALT  25  /  AlkPhos  66  10-02      Urinalysis Basic - ( 03 Oct 2023 06:27 )    Color: x / Appearance: x / SG: x / pH: x  Gluc: 88 mg/dL / Ketone: x  / Bili: x / Urobili: x   Blood: x / Protein: x / Nitrite: x   Leuk Esterase: x / RBC: x / WBC x   Sq Epi: x / Non Sq Epi: x / Bacteria: x    INTERPRETATION OF TELEMETRY:   Sr, [place]     ECG:   Ventricular Rate 116 BPM  Atrial Rate 116 BPM  P-R Interval 160 ms  QRS Duration 110 ms  Q-T Interval 346 ms  QTC Calculation(Bazett) 480 ms  P Axis 61 degrees  R Axis 34 degrees  T Axis 63 degrees  Diagnosis Line Sinus tachycardia  Possible Left atrial enlargement  Cannot rule out Anterior infarct , age undetermined  Abnormal ECG  Prior ECG: Yes [ x ] No [  ]  EC23: ST, 109 bpm, LAE, NSST abnormality, unchanged from prior on 22.                                                   [facility]                                              CARDIOLOGY AT [place] [address]                                             Telephone: [phone]. Fax:[phone]                                                       CARDIOLOGY CONSULTATION NOTE                                                                                             History obtained by: Patient and medical record  Community Cardiologist:   Dr. Borges   obtained: Yes [  ] No [  ] NA  Reason for Consultation:  Stroke Etilogy  Available out pt records reviewed: Yes [ x ] No [  ]    Chief complaint:    Patient is a 53y old  Male who presents with a chief complaint of cva (03 Oct 2023 12:57)      HPI:  54 y/o male with PMH of HTN, CHFrEF, GERD, HLD came to the ED complaining of slurred speech.   Symptom started around 8PM while he was eating dinner with his significant other; it happened for a few second but happened again; 4 episodes in total.    He has no other associated symptoms.   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   Cardiac consult called for stroke etiology  Deniers headache, dizziness, no blurry vision, chest pain, palpitations, sob, pnd, orthopnea, fever, chills, syncope, presyncope, weakness, numbness/tingling, n/v/d/c/ abdominal pain, change in bowel/urinary habit, recent travel, sick contacts.           CARDIAC TESTING   ECHO:  Echo: TTE 22: LVEF <20%, LVIDd 6.85cm, moderate RVE with moderately reduced RV systolic function, severe biatrial enlargement, moderate to sever MR, moderate TR, PASP 38.2mmHg (RAP 8).  ECHO TTE 10/3/2023 LVEF <20%,  Mildly reduced RV systolic function, Moderately enlarged right ventricle, Moderate mitral valve regurgitation,  Mild-moderate tricuspid regurgitation.     PROCEDURE DATE:  10/03/2023    INTERPRETATION:  TRANSTHORACIC ECHOCARDIOGRAM REPORT    Patient Name:   JENNIFER BARRIENTOS Patient Location: Inpatient  Medical Rec #:  [id number]      Accession #:      [id number]  Account #:                     Height:           70.1 in 178.0 cm  YOB: 1970      Weight:           209.4 lb 95.00 kg  Patient Age:    53 years       BSA:              2.13 m²  Patient Gender: M              BP:               162/101 mmHg    Date of Exam:        10/3/2023 11:44:42 AM  Sonographer:         Teagan Alexander  Referring Physician: Stephenie Hahn MD    Procedure:   2D Echo/Doppler/Color Doppler Complete.  Indications: I63.9 - Cerebral infarction, unspecified  Diagnosis:   I63.9 - Cerebral infarction, unspecified    2D AND M-MODE MEASUREMENTS (normal ranges within parentheses):  Left                 Normal   Aorta/Left            Normal  Ventricle:                    Atrium:  IVSd (2D):    0.82  (0.7-1.1) Aortic Root    2.90  (2.4-3.7)                 cm             (2D):           cm  LVPWd (2D):   1.04  (0.7-1.1) Left Atrium    5.00  (1.9-4.0)                 cm             (2D):           cm  LVIDd (2D):   7.36  (3.4-5.7) LA Volume      54.8                 cm             Index         ml/m²  LVIDs (2D):   7.35                 cm  LV FS (2D):   0.1 %  (>25%)  Relative Wall 0.28   (<0.42)  Thickness    SPECTRAL DOPPLER ANALYSIS (where applicable):  Aortic Valve: AoV Max Richie: 1.25 m/s AoV Peak P.2 mmHg AoV Mean PG:   3.0 mmHg    LVOT Vmax: 0.74 m/s LVOT VTI: 0.102 m LVOT Diameter: 2.00 cm    AoV Area, Vmax: 1.86 cm² AoV Area, VTI: 1.72 cm² AoV Area, Vmn: 1.67 cm²  Ao VTI: 0.187  Tricuspid Valve and PA/RV Systolic Pressure: TR Max Velocity: 2.85 m/s RA   Pressure: 3 mmHg RVSP/PASP: 35.5 mmHg    PHYSICIAN INTERPRETATION:  Left Ventricle: Endocardial visualization was enhanced with intravenous   echo contrast. The left ventricular internal cavity size is severely   increased.  Global LV systolic function was severely decreased. Left ventricular   ejection fraction, by visual estimation, is <20%.  Right Ventricle: The right ventricular size is moderately enlarged. RV   systolic function is mildly reduced.  Left Atrium: Severely enlarged left atrium.  Right Atrium: Severely enlarged right atrium.  Pericardium: There is no evidence of pericardial effusion.  Mitral Valve: Structurally normal mitral valve, with normal leaflet   excursion. Moderate mitral valve regurgitation is seen.  Tricuspid Valve: Structurally normal tricuspid valve, with normal leaflet   excursion. Mild-moderate tricuspid regurgitation is visualized. Estimated   pulmonary artery systolic pressure is 35.5 mmHg assuming a right atrial   pressure of 3 mmHg, which is consistent with borderline pulmonary   hypertension.  Aortic Valve: The aortic valve is trileaflet. Sclerotic aortic valve with   normal opening. No evidence of aortic valve regurgitation is seen.  Pulmonic Valve: Structurally normal pulmonic valve, with normal leaflet   excursion. Trace pulmonic valve regurgitation.  Aorta: The aortic root and ascending aorta are structurally normal, with   no evidence of dilitation.  Pulmonary Artery: The main pulmonary artery is normal in size.  Venous: The inferior vena cava is normal. The inferior vena cava was   normal sized, with respiratory size variation greater than 50%. The   inferior vena cava and the hepatic vein show a normal flow pattern.  In comparison to the previous echocardiogram(s): Prior examinations are   available and were reviewed for comparison purposes.    Summary:   1. Left ventricular ejection fraction, by visual estimation, is <20%.   2. Severely decreased global left ventricular systolic function.   3. Severely enlarged left atrium.   4. Severely enlarged right atrium.   5. Mildly reduced RV systolic function.   6. Moderately enlarged right ventricle.   7. Moderate mitral valve regurgitation.   8. Mild-moderate tricuspid regurgitation.   9. Sclerotic aortic valve with normal opening.  10. Estimated pulmonary artery systolic pressure is 35.5 mmHg assuming a   right atrial pressure of 3 mmHg, which is consistent with borderline   pulmonary hypertension.  11. Endocardial visualization was enhanced with intravenous echo contrast.    MD Bryan Electronically signed on 10/3/2023 at 2:43:31 PM      STRESS:    CATH:   Patient: JENNIFER BARRIENTOS          MRN: [id number]                  Study Date: 2022   12:51 PM     Page 1 of 3               Left Heart Cath   Left heart cath with pressures only was performed. A Pigtail 5Fr x 110cm was successfully advanced across   the aortic valve, placed in the left ventricle and pressures were recorded.       Coronary Angiography   Left Coronary System:   A catheter was positioned into the vessel ostium under fluoroscopic guidance. Contrast injections were   performed using power injection. Angiograms were obtained in multiple views.   Right Coronary System:   A catheter was positioned into the vessel ostium under fluoroscopic guidance. Contrast injections were   performed using power injection. Angiograms were obtained in multiple views.      Findings:      Left Heart Cath   Left ventricular function was not assessed. Ejection fraction was visually estimated by echo with a value of   15%. The left ventricular end diastolic pressure was 28 mmHg.       Coronary Angiography   The coronary circulation is right dominant.       Left Main   Left main artery: There was no disease.       Left Anterior Descending   Left anterior descending artery: There was no disease.       Circumflex   Circumflex: There was no disease.       Right Coronary   Right coronary artery: There was no disease.       Acute complication:    No complications      Estimated Blood Loss: < 10 ml      Conclusions and Findings   - No significant coronary artery disease      Recommendations   -     Medical management for nonischemic cardiomyopathy.    Follow-Up    Physician Signature:      Patient: JENNIFER BARRIENTOS          MRN: [id number]                  Study Date: 2022   12:51 PM     Page 2 of 3        Electronically signed by KELLY AVILA MD on 2022 at 01:34 PM   (No Signature Object)      Hemodynamic Pressures:   Phase          Location           [mmHg]                [mmHg]                [mmHg]            HR   Baseline       LV                   s      137                                     ed      46          84   Baseline       Ao                   s      132                d      104               m      114      82      Contrast:   Description          Dose         Unit       HIS No. Reference No.    Serial No.      Lot No.   Omnipaque 300            45.000   ml         [id number]   [id number]   (100ml)                                      1   Flow Calculations, Phase: Baseline      VO2: 278.09 ml/min      X-Ray:   Total Flouro Time:           2.4 min.                   Exam total DAP:            4470.83  cGycm??      Moderate Sedation Time   12 mins (12:49 - 13:01)      Medication:   I provided moderate sedation which included supervision of the sedating agent injection. Below is a   listing of the medication(s) ordered by me. I supervised the independant trained observer throughout   the procedure. A pre-service assessment was performed.   Description                  Dose         Unit           Route      Fentanyl                          50.000   mcg           IV   Versed                             1.000   mg            IV   2% Lidocaine                       4.000   ml            Subcut   Heparin                         4000.000   units         IA   Verapamil                          5.000   mg            IA      Inventory:   Description                  Quantity     HIS No.           Reference No.    Serial No.      Lot No.   .035in x 150cm Guideright     1.000       [id number]              [id number]   6Fr  Radial Sheath Prelude    1.000       [id number]              [id number]   FL3.5 5Fr x 100cm             1.000       78174              62454-22   FR4.0 5Fr x 100cm             1.000       26099              05767-08   Pigtail 5Fr x 110cm           1.000       28866              02699-89      Patient: JENNIFER BARRIENTOS          MRN: [id number]                  Study Date: 2022   12:51 PM     Page 3 of 3         Last signed by:	Kelly Avila MD at 2022  1:35 PM    ELECTROPHYSIOLOGY:     PAST MEDICAL HISTORY  CHF, chronic  Hypertension  Hyperlipidemia    PAST SURGICAL HISTORY  S/P hernia repair    SOCIAL HISTORY:  Denies smoking/alcohol/drugs  CIGARETTES:     ALCOHOL:  DRUGS:    FAMILY HISTORY:  Family history of stroke (Mother)  Family History of Cardiovascular Disease:  Yes [  ] No [ x ]  Coronary Artery Disease in first degree relative: Yes [  ] No [  x]  Sudden Cardiac Death in First degree relative: Yes [  ] No [ x ]    HOME MEDICATIONS:  aspirin 81 mg oral delayed release tablet: 1 tab(s) orally once a day (15 Braulio 2022 09:14)  Lipitor 40 mg oral tablet: 1 tab(s) orally once a day (15 Braulio 2022 09:14)  metoprolol succinate 50 mg oral tablet, extended release: 1 tab(s) orally once a day (15 Braulio 2022 09:14)  Pepcid 20 mg oral tablet: 1 tab(s) orally 2 times a day (15 Braulio 2022 09:14)  torsemide 20 mg oral tablet: 1 tab(s) orally 2 times a day (01 Oct 2023 05:13)    CURRENT CARDIAC MEDICATIONS:  metoprolol succinate ER 50 milliGRAM(s) Oral daily  sacubitril 49 mG/valsartan 51 mG 1 Tablet(s) Oral two times a day  torsemide 20 milliGRAM(s) Oral daily    CURRENT OTHER MEDICATIONS:  acetaminophen     Tablet .. 650 milliGRAM(s) Oral every 6 hours PRN Temp greater or equal to 38C (100.4F), Mild Pain (1 - 3)  melatonin 3 milliGRAM(s) Oral at bedtime PRN Insomnia  ondansetron Injectable 4 milliGRAM(s) IV Push every 8 hours PRN Nausea and/or Vomiting  aluminum hydroxide/magnesium hydroxide/simethicone Suspension 30 milliLiter(s) Oral every 4 hours PRN Dyspepsia  famotidine    Tablet 20 milliGRAM(s) Oral two times a day  aspirin enteric coated 81 milliGRAM(s) Oral daily  atorvastatin 80 milliGRAM(s) Oral at bedtime  clopidogrel Tablet 75 milliGRAM(s) Oral daily  dapagliflozin 10 milliGRAM(s) Oral every 24 hours  enoxaparin Injectable 40 milliGRAM(s) SubCutaneous every 24 hours    ALLERGIES:   No Known Allergies    REVIEW OF SYMPTOMS:   CONSTITUTIONAL: No fever, no chills, no weight loss, no weight gain, no fatigue   ENMT:  No vertigo; No sinus or throat pain  NECK: No pain or stiffness  CARDIOVASCULAR: No chest pain, no dyspnea, no syncope/presyncope, no palpitations, no dizziness, no Orthopnea, no Paroxsymal nocturnal dyspnea  RESPIRATORY: no Shortness of breath, no cough, no wheezing  : No dysuria, no hematuria   GI: No dark color stool, no nausea, no diarrhea, no constipation, no abdominal pain   NEURO: No headache, no slurred speech   MUSCULOSKELETAL: No joint pain or swelling; No muscle, back, or extremity pain  PSYCH: No agitation, no anxiety.    ALL OTHER REVIEW OF SYSTEMS ARE NEGATIVE.    VITAL SIGNS:  T(C): 36.5 (10-03-23 @ 10:57), Max: 37 (10-02-23 @ 23:33)  T(F): 97.7 (10-03-23 @ 10:57), Max: 98.6 (10-02-23 @ 23:33)  HR: 107 (10-03-23 @ 10:57) (100 - 110)  BP: 140/110 (10-03-23 @ 07:22) (136/95 - 162/101)  RR: 18 (10-03-23 @ 05:35) (18 - 18)  SpO2: 97% (10-03-23 @ 10:57) (96% - 98%)  INTAKE AND OUTPUT:     10-03 @ 07:01  -  10-03 @ 14:51  --------------------------------------------------------  IN: 550 mL / OUT: 0 mL / NET: 550 mL    PHYSICAL EXAM:  Constitutional: Comfortable . No acute distress.   HEENT: Atraumatic and normocephalic , neck is supple . no JVD. No carotid bruit.  CNS: A&Ox3. No focal deficits.   Respiratory: CTAB, unlabored   Cardiovascular: RRR normal s1 s2. No murmur. No rubs or gallop.  Gastrointestinal: Soft, non-tender. +Bowel sounds.   Extremities: + Peripheral Pulses, No clubbing, cyanosis, or edema  Psychiatric: Calm . no agitation.   Skin: Warm and dry, no ulcers on extremities     LABS:  ( 01 Oct 2023 01:58 )  Troponin T  <0.01,  CPK  X    , CKMB  X    , BNP X                            14.1   6.53  )-----------( 337      ( 03 Oct 2023 06:27 )             43.5     1003    139  |  101  |  18.0  ----------------------------<  88  3.9   |  27.0  |  1.46<H>    Ca    8.6      03 Oct 2023 06:27  Mg     2.3     10-    TPro  6.4<L>  /  Alb  3.1<L>  /  TBili  0.4  /  DBili  x   /  AST  20  /  ALT  25  /  AlkPhos  66  10-02      Urinalysis Basic - ( 03 Oct 2023 06:27 )    Color: x / Appearance: x / SG: x / pH: x  Gluc: 88 mg/dL / Ketone: x  / Bili: x / Urobili: x   Blood: x / Protein: x / Nitrite: x   Leuk Esterase: x / RBC: x / WBC x   Sq Epi: x / Non Sq Epi: x / Bacteria: x    INTERPRETATION OF TELEMETRY:   Sr, [place]     ECG:   Ventricular Rate 116 BPM  Atrial Rate 116 BPM  P-R Interval 160 ms  QRS Duration 110 ms  Q-T Interval 346 ms  QTC Calculation(Bazett) 480 ms  P Axis 61 degrees  R Axis 34 degrees  T Axis 63 degrees  Diagnosis Line Sinus tachycardia  Possible Left atrial enlargement  Cannot rule out Anterior infarct , age undetermined  Abnormal ECG  Prior ECG: Yes [ x ] No [  ]  EC23: ST, 109 bpm, LAE, NSST abnormality, unchanged from prior on 22.

## 2023-10-03 NOTE — PROGRESS NOTE ADULT - SUBJECTIVE AND OBJECTIVE BOX
Chief Complaint:  cva    SUBJECTIVE / OVERNIGHT EVENTS:  no acute events reported overnight.  pt offers no acute complaints at this time.         I&O's Summary        PHYSICAL EXAM:  Vital Signs Last 24 Hrs  T(C): 36.5 (03 Oct 2023 10:57), Max: 37 (02 Oct 2023 23:33)  T(F): 97.7 (03 Oct 2023 10:57), Max: 98.6 (02 Oct 2023 23:33)  HR: 107 (03 Oct 2023 10:57) (100 - 110)  BP: 140/110 (03 Oct 2023 07:22) (126/76 - 162/101)  BP(mean): --  RR: 18 (03 Oct 2023 05:35) (18 - 18)  SpO2: 97% (03 Oct 2023 10:57) (96% - 98%)    Parameters below as of 03 Oct 2023 10:57  Patient On (Oxygen Delivery Method): room air      GENERAL: pt examined bedside, laying comfortably in bed in NAD  HEENT: NC/AT, moist oral mucosa, clear conjunctiva, sclera nonicteric  RESPIRATORY: Normal respiratory effort, no wheezing, rhonchi, rales  CARDIOVASCULAR: RRR, normal S1 and S2  ABDOMEN: soft, NT/ND, normoactive bowel sounds, no rebound/guarding  EXTREMITIES: No cynaosis, no clubbing, no lower extremity edema  NEUROLOGY: A+Ox3, no focal neurologic deficits appreciated   SKIN: No rashes or no palpable lesions        LABS:                                         14.1   6.53  )-----------( 337      ( 03 Oct 2023 06:27 )             43.5       10-03    139  |  101  |  18.0  ----------------------------<  88  3.9   |  27.0  |  1.46<H>    Ca    8.6      03 Oct 2023 06:27  Mg     2.3     10-02    TPro  6.4<L>  /  Alb  3.1<L>  /  TBili  0.4  /  DBili  x   /  AST  20  /  ALT  25  /  AlkPhos  66  10-02    Urinalysis Basic - ( 02 Oct 2023 06:28 )    Color: x / Appearance: x / SG: x / pH: x  Gluc: 93 mg/dL / Ketone: x  / Bili: x / Urobili: x   Blood: x / Protein: x / Nitrite: x   Leuk Esterase: x / RBC: x / WBC x   Sq Epi: x / Non Sq Epi: x / Bacteria: x        CAPILLARY BLOOD GLUCOSE            RADIOLOGY & ADDITIONAL TESTS:    < from: Xray Chest 1 View- PORTABLE-Urgent (10.01.23 @ 03:20) >  IMPRESSION: Cardiomegaly and increased interstitial markings may   represent acute and/or chronic vascular congestion    < end of copied text >      < from: CT Angio Neck Stroke Protocol w/ IV Cont (10.01.23 @ 01:54) >    IMPRESSION:    CTA head/neck: No proximal large vessel occlusion, high-grade stenosis,   or dissection.    CT perfusion: No core infarct identified. Rapid analysis shows 6 cc of   decreased perfusion in the right frontal and left occipital region, which   may be artifactual.    MRI may be considered for more sensitive evaluation for acute infarction   if symptoms persist.    A few patchy groundglass opacities in the right greater than left lung   apices, nonspecific, possibly infectious, correlate clinically.    < end of copied text >    < from: TTE Echo Complete w/ Contrast w/ Doppler (06.16.22 @ 14:15) >  Summary:   1. Severely increased left ventricular internal cavity size.   2. Severely decreased global left ventricular systolic function.   3. Left ventricular ejection fraction, by visual estimation, is <20%.   4. Dilated cardiomyopathy.   5. Moderately enlarged right ventricle.   6. Mildly increased RV wall thickness.   7. Moderately reduced RV systolic function.   8. Moderate to severe right atrial enlargement.   9. Severely enlarged left atrium.  10. Mild thickening of the anterior and posterior mitral valve leaflets.  11. Moderate to severe mitral valve regurgitation.  12. Moderate tricuspid regurgitation.  13. Normal trileaflet aortic valve with normal opening.  14. Estimated pulmonary artery systolic pressure is 38.2 mmHg assuming a   right atrial pressure of 8 mmHg, which is consistent with borderline   pulmonary hypertension.  15. There is no evidence of pericardial effusion.  16. Endocardial visualization was enhanced with intravenous echo contrast.    < end of copied text >      < from: MR Head No Cont (10.02.23 @ 21:38) >  IMPRESSION:    Acute left insular infarct. No acute intracranial hemorrhage.    < end of copied text >      MEDICATIONS  (STANDING):  aspirin enteric coated 81 milliGRAM(s) Oral daily  atorvastatin 40 milliGRAM(s) Oral at bedtime  dapagliflozin 10 milliGRAM(s) Oral every 24 hours  enoxaparin Injectable 40 milliGRAM(s) SubCutaneous every 24 hours  famotidine    Tablet 20 milliGRAM(s) Oral two times a day    MEDICATIONS  (PRN):  acetaminophen     Tablet .. 650 milliGRAM(s) Oral every 6 hours PRN Temp greater or equal to 38C (100.4F), Mild Pain (1 - 3)  aluminum hydroxide/magnesium hydroxide/simethicone Suspension 30 milliLiter(s) Oral every 4 hours PRN Dyspepsia  melatonin 3 milliGRAM(s) Oral at bedtime PRN Insomnia  ondansetron Injectable 4 milliGRAM(s) IV Push every 8 hours PRN Nausea and/or Vomiting

## 2023-10-03 NOTE — PROGRESS NOTE ADULT - ASSESSMENT
INCOMPLETE    Assessment and Recommendation: 	  ASSESSMENT: Patient is a 53 year old male with PMHx of HTN, CHFrEF, GERD, and HLD who presented to [facility] on 10/1/23 due to 4 episodes of transient dysarthria and difficulty getting the words out. Patient stated that at 8:00 pm on 9/30/23 patient's wife noticed that he was slurry his words. Patient also stated that he was having trouble getting the words out,even though he knew what he wanted to say. Episode lasted around 2-3 minutes. A few minutes later patient experienced another similar episode. Throughout the night patient had another 2 episodes of the exact nature which prompted him to come to the ED. SBP on admittion was in the 190s and patient did admit to missing some of his hypertensive medications recently. Initial head CT demonstrated focal hypoattenuation in the region of the left frontal operculum, which may reflect acute or subacute infarct. CTA head and neck revealed no LVO, high grade stenosis or dissection. CTP did demonstrate 6 cc of decreased perfusion in the right frontal and left occipital region, which may be artifactual. Patient not a tenecteplase candidate due to resolution of symptoms with NIHSS of 0. Patient not a mechanical thrombectomy candidate due to no presence of LVO. 10/2/23 MRI head w/o revealed acute left insular infarct.          NEURO:   -Neurologically back to baseline as per patient and fiance at bedside. Patient with nonfocal neurological exam.   -Continue close monitoring for neurologic deterioration    - Stroke neuro checks q 2  -MRI Brain revealed acute left insular infarct   - SBP goal 160-180. Avoid rapid fluctuations and hypotension.  -Risk factor modification once discharged. Patient was strongly advised that he should monitor and record blood pressure twice a day   -ANTITHROMBOTIC THERAPY: ASA 81mg daily  -titrate statin to LDL goal less than 70. LDL is 80. Suggest high intensity statin as long as no medical contraindications  -Dysphagia screen: Pass.   -Physical therapy/OT/Speech eval/treatment.     -CARDIOVASCULAR: obtain TTE, cardiac monitoring w/ telemetry for now, further evaluation pending findings of noted workup                              -HEMATOLOGY: H/H 15.0/45.2.  Platelets 371. patient should have all age and risk appropriate malignancy screenings with PCP or sooner if clinically suspected      DVT ppx: Heparin s.c [] LMWH [x]     PULMONARY:  protecting airway, saturating well     RENAL: BUN/Cr 14.9/1.33. monitor urine output, maintain adequate hydration       Na Goal:  135-145    ID: afebrile, no leukocytosis, monitor for si/sx of infection     OTHER:  condition and plan of care d/w patient and fiance at bedside, questions and concerns addressed.   -Patient states to feel very anxious and have a hx of anxiety. Suggest possible behavorial health consult  -Patient strongly counseled the importance of taking his medications as prescribed and the risks that could come form not doing so.     DISPOSITION: Rehab or home depending on PT eval once stable and workup is complete      CORE MEASURES:        Admission NIHSS: 0     Tenecteplase : [] YES [x] NO      LDL/HDL/A1C: 80/38/5.5%     Depression Screen- if depression hx and/or present      Statin Therapy: as noted     Dysphagia Screen: [x] PASS [] FAIL     Smoking [] YES [x] NO      Afib [] YES [x] NO     Stroke Education [] YES [] NO- ordered and pending    Obtain screening lower extremity venous ultrasound in patients who meet 1 or more of the following criteria as patient is high risk for DVT/PE on admission:   [] History of DVT/PE  []Hypercoagulable states (Factor V Leiden, Cancer, OCP, etc. )  []Prolonged immobility (hemiplegia/hemiparesis/post operative or any other extended immobilization)  [] Transferred from outside facility (Rehab or Long term care)  [] Age </= to 50   INCOMPLETE    Assessment and Recommendation: 	  ASSESSMENT: Patient is a 53 year old male with PMHx of HTN, CHFrEF, GERD, and HLD who presented to [facility] on 10/1/23 due to 4 episodes of transient dysarthria and difficulty getting the words out. Patient stated that at 8:00 pm on 9/30/23 patient's wife noticed that he was slurry his words. Patient also stated that he was having trouble getting the words out,even though he knew what he wanted to say. Episode lasted around 2-3 minutes. A few minutes later patient experienced another similar episode. Throughout the night patient had another 2 episodes of the exact nature which prompted him to come to the ED. SBP on admittion was in the 190s and patient did admit to missing some of his hypertensive medications recently. Initial head CT demonstrated focal hypoattenuation in the region of the left frontal operculum, which may reflect acute or subacute infarct. CTA head and neck revealed no LVO, high grade stenosis or dissection. CTP did demonstrate 6 cc of decreased perfusion in the right frontal and left occipital region, which may be artifactual. Patient not a tenecteplase candidate due to resolution of symptoms with NIHSS of 0. Patient not a mechanical thrombectomy candidate due to no presence of LVO. 10/2/23 MRI head revealed acute left insular infarct.          NEURO:   -Neurologically back to baseline as per patient and fiance at bedside. Patient with nonfocal neurological exam.   -Continue close monitoring for neurologic deterioration    - Stroke neuro checks q 2  -MRI Brain revealed acute left insular infarct   - SBP goal 160-180. Avoid rapid fluctuations and hypotension.  -Risk factor modification once discharged. Patient was strongly advised that he should monitor and record blood pressure twice a day   -ANTITHROMBOTIC THERAPY: ASA 81mg daily  -titrate statin to LDL goal less than 70. LDL is 80. Suggest high intensity statin as long as no medical contraindications  -Dysphagia screen: Pass.   -Physical therapy/OT/Speech eval/treatment.     -CARDIOVASCULAR: obtain TTE, cardiac monitoring w/ telemetry for now, further evaluation pending findings of noted workup                              -HEMATOLOGY: H/H 14.1/43.5  Platelets 337,  patient should have all age and risk appropriate malignancy screenings with PCP or sooner if clinically suspected      DVT ppx: Heparin s.c [] LMWH [x]     PULMONARY:  protecting airway, saturating well on room air    RENAL: BUN/Cr 18/1.46,  monitor urine output, maintain adequate hydration       Na Goal:  135-145    ID: afebrile, no leukocytosis, monitor for si/sx of infection     OTHER:  condition and plan of care d/w patient and fiance at bedside, questions and concerns addressed.   -Patient states to feel very anxious and have a hx of anxiety. Suggest possible behavorial health consult  -Patient strongly counseled the importance of taking his medications as prescribed and the risks that could come form not doing so.     DISPOSITION: Rehab or home depending on PT eval once stable and workup is complete      CORE MEASURES:        Admission NIHSS: 0     Tenecteplase : [] YES [x] NO      LDL/HDL/A1C: 80/38/5.5%     Depression Screen- if depression hx and/or present      Statin Therapy: as noted     Dysphagia Screen: [x] PASS [] FAIL     Smoking [] YES [x] NO      Afib [] YES [x] NO     Stroke Education [] YES [] NO- ordered and pending    Obtain screening lower extremity venous ultrasound in patients who meet 1 or more of the following criteria as patient is high risk for DVT/PE on admission:   [] History of DVT/PE  []Hypercoagulable states (Factor V Leiden, Cancer, OCP, etc. )  []Prolonged immobility (hemiplegia/hemiparesis/post operative or any other extended immobilization)  [] Transferred from outside facility (Rehab or Long term care)  [] Age </= to 50   INCOMPLETE    Assessment and Recommendation: 	  ASSESSMENT: Patient is a 53 year old male with PMHx of HTN, CHFrEF, GERD, and HLD who presented to [facility] on 10/1/23 due to 4 episodes of transient dysarthria and difficulty getting the words out. Patient stated that at 8:00 pm on 9/30/23 patient's wife noticed that he was slurry his words. Patient also stated that he was having trouble getting the words out,even though he knew what he wanted to say. Episode lasted around 2-3 minutes. A few minutes later patient experienced another similar episode. Throughout the night patient had another 2 episodes of the exact nature which prompted him to come to the ED. SBP on admittion was in the 190s and patient did admit to missing some of his hypertensive medications recently. Initial head CT demonstrated focal hypoattenuation in the region of the left frontal operculum, which may reflect acute or subacute infarct. CTA head and neck revealed no LVO, high grade stenosis or dissection. CTP did demonstrate 6 cc of decreased perfusion in the right frontal and left occipital region, which may be artifactual. Patient not a tenecteplase candidate due to resolution of symptoms with NIHSS of 0. Patient not a mechanical thrombectomy candidate due to no presence of LVO. 10/2/23 MRI head revealed acute left insular infarct.          NEURO:   -Neurologically back to baseline as per patient and fiance at bedside. Patient with nonfocal neurological exam.   -Continue close monitoring for neurologic deterioration    - Stroke neuro checks q 4 hour  -MRI Brain revealed acute left insular infarct   - SBP goal 120-150 mmHg. Avoid rapid fluctuations and hypotension.  -Risk factor modification once discharged. Patient was strongly advised that he should monitor and record blood pressure twice a day   -ANTITHROMBOTIC THERAPY: ASA 81mg daily  -titrate statin to LDL goal less than 70. LDL is 80. Suggest high intensity statin as long as no medical contraindications  -Dysphagia screen: Pass. DASH diet, tolerating well  -Physical therapy/OT/Speech eval/treatment.     -CARDIOVASCULAR: obtain TTE, cardiac monitoring w/ telemetry for now, further evaluation pending findings of noted workup                              -HEMATOLOGY: H/H 14.1/43.5  Platelets 337,  patient should have all age and risk appropriate malignancy screenings with PCP or sooner if clinically suspected      DVT ppx: Heparin s.c [] LMWH [x]     PULMONARY:  protecting airway, saturating well on room air    RENAL: BUN/Cr 18/1.46,  monitor urine output, maintain adequate hydration       Na Goal:  135-145    ID: afebrile, no leukocytosis, monitor for si/sx of infection     OTHER:  condition and plan of care d/w patient and fiance at bedside, questions and concerns addressed.   -Patient states to feel very anxious and have a hx of anxiety. Suggest possible behavorial health consult  -Patient strongly counseled the importance of taking his medications as prescribed and the risks that could come form not doing so.     DISPOSITION: Rehab or home depending on PT eval once stable and workup is complete      CORE MEASURES:        Admission NIHSS: 0     Tenecteplase : [] YES [x] NO      LDL/HDL/A1C: 80/38/5.5%     Depression Screen- if depression hx and/or present      Statin Therapy: as noted     Dysphagia Screen: [x] PASS [] FAIL     Smoking [] YES [x] NO      Afib [] YES [x] NO     Stroke Education [] YES [] NO- ordered and pending    Obtain screening lower extremity venous ultrasound in patients who meet 1 or more of the following criteria as patient is high risk for DVT/PE on admission:   [] History of DVT/PE  []Hypercoagulable states (Factor V Leiden, Cancer, OCP, etc. )  []Prolonged immobility (hemiplegia/hemiparesis/post operative or any other extended immobilization)  [] Transferred from outside facility (Rehab or Long term care)  [] Age </= to 50   INCOMPLETE    Assessment and Recommendation: 	  ASSESSMENT: Patient is a 53 year old male with PMHx of HTN, CHFrEF, GERD, and HLD who presented to [facility] on 10/1/23 due to 4 episodes of transient dysarthria and difficulty getting the words out. Patient stated that at 8:00 pm on 9/30/23 patient's wife noticed that he was slurry his words. Patient also stated that he was having trouble getting the words out,even though he knew what he wanted to say. Episode lasted around 2-3 minutes. A few minutes later patient experienced another similar episode. Throughout the night patient had another 2 episodes of the exact nature which prompted him to come to the ED. SBP on admittion was in the 190s and patient did admit to missing some of his hypertensive medications recently. Initial head CT demonstrated focal hypoattenuation in the region of the left frontal operculum, which may reflect acute or subacute infarct. CTA head and neck revealed no LVO, high grade stenosis or dissection. CTP did demonstrate 6 cc of decreased perfusion in the right frontal and left occipital region, which may be artifactual. Patient not a tenecteplase candidate due to resolution of symptoms with NIHSS of 0. Patient not a mechanical thrombectomy candidate due to no presence of LVO. 10/2/23 MRI head revealed acute left insular infarct.          NEURO:   -Neurologically back to baseline as per patient and fiance at bedside. Patient with nonfocal neurological exam.   -Continue close monitoring for neurologic deterioration    - Stroke neuro checks q 4 hour  -MRI Brain revealed acute left insular infarct   - SBP goal 120-150 mmHg. Avoid rapid fluctuations and hypotension.  -Risk factor modification once discharged. Patient was strongly advised that he should monitor and record blood pressure twice a day   -ANTITHROMBOTIC THERAPY: ASA 81mg daily  -titrate statin to LDL goal less than 70. LDL is 80. Suggest high intensity statin as long as no medical contraindications  -Dysphagia screen: Pass. DASH diet, tolerating well  -Physical therapy/OT/Speech eval/treatment.     -CARDIOVASCULAR: obtain TTE, recommend JONNA, cardiology consult and HF team consult in settings of low EF; TTE from 6/2022 revealed EF 20%.  Continue cardiac monitoring w/ telemetry for now, further evaluation pending findings of noted workup.                              -HEMATOLOGY: H/H 14.1/43.5  Platelets 337,  patient should have all age and risk appropriate malignancy screenings with PCP or sooner if clinically suspected      DVT ppx: Heparin s.c [] LMWH [x]     PULMONARY:  protecting airway, saturating well on room air    RENAL: BUN/Cr 18/1.46,  monitor urine output, maintain adequate hydration       Na Goal:  135-145    ID: afebrile, no leukocytosis, monitor for si/sx of infection     OTHER:  condition and plan of care d/w patient and fiance at bedside, questions and concerns addressed.   -Patient states to feel very anxious and have a hx of anxiety. Suggest possible behavorial health consult  -Patient strongly counseled the importance of taking his medications as prescribed and the risks that could come form not doing so.     DISPOSITION: Rehab or home depending on PT eval once stable and workup is complete      CORE MEASURES:        Admission NIHSS: 0     Tenecteplase : [] YES [x] NO      LDL/HDL/A1C: 80/38/5.5%     Depression Screen- if depression hx and/or present      Statin Therapy: as noted     Dysphagia Screen: [x] PASS [] FAIL     Smoking [] YES [x] NO      Afib [] YES [x] NO     Stroke Education [] YES [] NO- ordered and pending    Obtain screening lower extremity venous ultrasound in patients who meet 1 or more of the following criteria as patient is high risk for DVT/PE on admission:   [] History of DVT/PE  []Hypercoagulable states (Factor V Leiden, Cancer, OCP, etc. )  []Prolonged immobility (hemiplegia/hemiparesis/post operative or any other extended immobilization)  [] Transferred from outside facility (Rehab or Long term care)  [] Age </= to 50   INCOMPLETE    Assessment and Recommendation: 	  ASSESSMENT: Patient is a 53 year old male with PMHx of HTN, CHFrEF, GERD, and HLD who presented to [facility] on 10/1/23 due to 4 episodes of transient dysarthria and difficulty getting the words out. Patient stated that at 8:00 pm on 9/30/23 patient's wife noticed that he was slurry his words. Patient also stated that he was having trouble getting the words out,even though he knew what he wanted to say. Episode lasted around 2-3 minutes. A few minutes later patient experienced another similar episode. Throughout the night patient had another 2 episodes of the exact nature which prompted him to come to the ED. SBP on admittion was in the 190s and patient did admit to missing some of his hypertensive medications recently. Initial head CT demonstrated focal hypoattenuation in the region of the left frontal operculum, which may reflect acute or subacute infarct. CTA head and neck revealed no LVO, high grade stenosis or dissection. CTP did demonstrate 6 cc of decreased perfusion in the right frontal and left occipital region, which may be artifactual. Patient not a tenecteplase candidate due to resolution of symptoms with NIHSS of 0. Patient not a mechanical thrombectomy candidate due to no presence of LVO. 10/2/23 MRI head revealed acute left insular infarct.         NEURO:   -Neurologically back to baseline as per patient and fiance at bedside. Patient with nonfocal neurological exam.   -Continue close monitoring for neurologic deterioration    - Stroke neuro checks q 4 hour  -MRI Brain revealed acute left insular infarct   - SBP goal 120-150 mmHg. Avoid rapid fluctuations and hypotension.  -Risk factor modification once discharged. Patient was strongly advised that he should monitor and record blood pressure twice a day   -ANTITHROMBOTIC THERAPY: ASA 81mg daily  -titrate statin to LDL goal less than 70. LDL is 80. Suggest high intensity statin as long as no medical contraindications  -Dysphagia screen: Pass. DASH diet, tolerating well  -Physical therapy/OT/Speech eval/treatment.     -CARDIOVASCULAR: obtain TTE, recommend JONNA, cardiology consult and HF team consult in settings of low EF; TTE from 6/2022 revealed EF 20%.  Continue cardiac monitoring w/ telemetry for now, further evaluation pending findings of noted workup.                              -HEMATOLOGY: H/H 14.1/43.5  Platelets 337,  patient should have all age and risk appropriate malignancy screenings with PCP or sooner if clinically suspected      DVT ppx: Heparin s.c [] LMWH [x]     PULMONARY:  protecting airway, saturating well on room air    RENAL: BUN/Cr 18/1.46,  monitor urine output, maintain adequate hydration       Na Goal:  135-145    ID: afebrile, no leukocytosis, monitor for si/sx of infection     OTHER:  condition and plan of care d/w patient and fiance at bedside, questions and concerns addressed.   -Patient states to feel very anxious and have a hx of anxiety. Suggest possible behavorial health consult  -Patient strongly counseled the importance of taking his medications as prescribed and the risks that could come form not doing so.     DISPOSITION: Rehab or home depending on PT eval once stable and workup is complete      CORE MEASURES:        Admission NIHSS: 0     Tenecteplase : [] YES [x] NO      LDL/HDL/A1C: 80/38/5.5%     Depression Screen- if depression hx and/or present      Statin Therapy: as noted     Dysphagia Screen: [x] PASS [] FAIL     Smoking [] YES [x] NO      Afib [] YES [x] NO     Stroke Education [] YES [] NO- ordered and pending    Obtain screening lower extremity venous ultrasound in patients who meet 1 or more of the following criteria as patient is high risk for DVT/PE on admission:   [] History of DVT/PE  []Hypercoagulable states (Factor V Leiden, Cancer, OCP, etc. )  []Prolonged immobility (hemiplegia/hemiparesis/post operative or any other extended immobilization)  [] Transferred from outside facility (Rehab or Long term care)  [] Age </= to 50   INCOMPLETE    Assessment and Recommendation: 	  ASSESSMENT: Patient is a 53 year old male with PMHx of HTN, CHFrEF, GERD, and HLD who presented to [facility] on 10/1/23 due to 4 episodes of transient dysarthria and difficulty getting the words out. Patient stated that at 8:00 pm on 9/30/23 patient's wife noticed that he was slurry his words. Patient also stated that he was having trouble getting the words out,even though he knew what he wanted to say. Episode lasted around 2-3 minutes. A few minutes later patient experienced another similar episode. Throughout the night patient had another 2 episodes of the exact nature which prompted him to come to the ED. SBP on admittion was in the 190s and patient did admit to missing some of his hypertensive medications recently. Initial head CT demonstrated focal hypoattenuation in the region of the left frontal operculum, which may reflect acute or subacute infarct. CTA head and neck revealed no LVO, high grade stenosis or dissection. CTP did demonstrate 6 cc of decreased perfusion in the right frontal and left occipital region, which may be artifactual. Patient not a tenecteplase candidate due to resolution of symptoms with NIHSS of 0. Patient not a mechanical thrombectomy candidate due to no presence of LVO. 10/2/23 MRI head revealed acute left insular infarct. Etiology: ESUS.          NEURO:   -Neurologically back to baseline as per patient and fiance at bedside. Patient with nonfocal neurological exam.   -Continue close monitoring for neurologic deterioration    - Stroke neuro checks q 4 hour  -MRI Brain revealed acute left insular infarct   - would recommend hypercoagulable panel  - SBP goal 120-150 mmHg. Avoid rapid fluctuations and hypotension.  -Risk factor modification once discharged. Patient was strongly advised that he should monitor and record blood pressure twice a day   -ANTITHROMBOTIC THERAPY: ASA 81mg PO daily, Clopidogrel 75 mg PO daily x 21 days   -titrate statin to LDL goal less than 70. LDL is 80. Suggest high intensity statin as long as no medical contraindications  -Dysphagia screen: Pass. DASH diet, tolerating well  -Physical therapy/OT/Speech eval/treatment.     -CARDIOVASCULAR: obtain TTE first, pending TTE findings, would recommend JONNA and ILR for long term screening. Cardiology consult and HF team consult in settings of low EF; TTE from 6/2022 revealed EF 20%.  Continue cardiac monitoring w/ telemetry for now, further evaluation pending findings of noted workup.                              -HEMATOLOGY: H/H 14.1/43.5  Platelets 337,  patient should have all age and risk appropriate malignancy screenings with PCP or sooner if clinically suspected. Would recommend hypercoagulable panel for now. Pending results of further work up would suggest CT A/C/P and LE Doppler.      DVT ppx: Heparin s.c [] LMWH [x]     PULMONARY:  protecting airway, saturating well on room air    RENAL: BUN/Cr 18/1.46, CKD, renal consult,  monitor urine output, maintain adequate hydration       Na Goal:  135-145    ID: afebrile, no leukocytosis, monitor for si/sx of infection     OTHER:  condition and plan of care d/w patient and fiance at bedside, questions and concerns addressed.   -Patient states to feel very anxious and have a hx of anxiety. Suggest possible behavorial health consult  -Patient strongly counseled the importance of taking his medications as prescribed and the risks that could come form not doing so.     DISPOSITION: Rehab or home depending on PT eval once stable and workup is complete      CORE MEASURES:        Admission NIHSS: 0     Tenecteplase : [] YES [x] NO      LDL/HDL/A1C: 80/38/5.5%     Depression Screen- if depression hx and/or present      Statin Therapy: as noted     Dysphagia Screen: [x] PASS [] FAIL     Smoking [] YES [x] NO      Afib [] YES [x] NO     Stroke Education [] YES [] NO- ordered and pending    Obtain screening lower extremity venous ultrasound in patients who meet 1 or more of the following criteria as patient is high risk for DVT/PE on admission:   [] History of DVT/PE  []Hypercoagulable states (Factor V Leiden, Cancer, OCP, etc. )  []Prolonged immobility (hemiplegia/hemiparesis/post operative or any other extended immobilization)  [] Transferred from outside facility (Rehab or Long term care)  [] Age </= to 50 Assessment and Recommendation: 	  ASSESSMENT: Patient is a 53 year old male with PMHx of HTN, CHFrEF, GERD, and HLD who presented to [place] [facility] on 10/1/23 due to 4 episodes of transient dysarthria and difficulty getting the words out. Patient stated that at 8:00 pm on 9/30/23 patient's wife noticed that he was slurry his words. Patient also stated that he was having trouble getting the words out,even though he knew what he wanted to say. Episode lasted around 2-3 minutes. A few minutes later patient experienced another similar episode. Throughout the night patient had another 2 episodes of the exact nature which prompted him to come to the ED. SBP on admittion was in the 190s and patient did admit to missing some of his hypertensive medications recently. Initial head CT demonstrated focal hypoattenuation in the region of the left frontal operculum, which may reflect acute or subacute infarct. CTA head and neck revealed no LVO, high grade stenosis or dissection. CTP did demonstrate 6 cc of decreased perfusion in the right frontal and left occipital region, which may be artifactual. Patient not a tenecteplase candidate due to resolution of symptoms with NIHSS of 0. Patient not a mechanical thrombectomy candidate due to no presence of LVO. 10/2/23 MRI head revealed acute left insular infarct. Etiology: ESUS.          NEURO:   -Neurologically back to baseline as per patient and fiance at bedside. Patient with nonfocal neurological exam.   -Continue close monitoring for neurologic deterioration    - Stroke neuro checks q 4 hour  -MRI Brain revealed acute left insular infarct  -TTE  - would recommend hypercoagulable panel  - SBP goal 120-150 mmHg. Avoid rapid fluctuations and hypotension.  -Risk factor modification once discharged. Patient was strongly advised that he should monitor and record blood pressure twice a day   -ANTITHROMBOTIC THERAPY: ASA 81mg PO daily, Clopidogrel 75 mg PO daily x 21 days   -titrate statin to LDL goal less than 70. LDL is 80. Suggest high intensity statin as long as no medical contraindications  -Dysphagia screen: Pass. DASH diet, tolerating well  -Physical therapy/OT/Speech eval/treatment.     -CARDIOVASCULAR: obtain TTE first, pending TTE findings, would recommend JONNA and ILR for long term screening. Cardiology consult and HF team consult in settings of low EF; TTE from 6/2022 revealed EF 20%.  Continue cardiac monitoring w/ telemetry for now, further evaluation pending findings of noted workup.                              -HEMATOLOGY: H/H 14.1/43.5  Platelets 337,  patient should have all age and risk appropriate malignancy screenings with PCP or sooner if clinically suspected. Would recommend hypercoagulable panel for now. Pending results of further work up and if JONNA is negative, would recommend CT C/A/P     DVT ppx: Heparin s.c [] LMWH [x]     PULMONARY:  protecting airway, saturating well on room air    RENAL: BUN/Cr 18/1.46, CKD, renal consult,  monitor urine output, maintain adequate hydration       Na Goal:  135-145    ID: afebrile, no leukocytosis, monitor for si/sx of infection     OTHER:  condition and plan of care d/w patient and fiance at bedside, questions and concerns addressed.   -Patient states to feel very anxious and have a hx of anxiety. Suggest possible behavorial health consult  -Patient strongly counseled the importance of taking his medications as prescribed and the risks that could come form not doing so.     DISPOSITION:  home depending on PT eval once stable and workup is complete      CORE MEASURES:        Admission NIHSS: 0     Tenecteplase : [] YES [x] NO      LDL/HDL/A1C: 80/38/5.5%     Depression Screen- if depression hx and/or present      Statin Therapy: as noted     Dysphagia Screen: [x] PASS [] FAIL     Smoking [] YES [x] NO      Afib [] YES [x] NO     Stroke Education [] YES [] NO- ordered and pending    Obtain screening lower extremity venous ultrasound in patients who meet 1 or more of the following criteria as patient is high risk for DVT/PE on admission:   [] History of DVT/PE  []Hypercoagulable states (Factor V Leiden, Cancer, OCP, etc. )  []Prolonged immobility (hemiplegia/hemiparesis/post operative or any other extended immobilization)  [] Transferred from outside facility (Rehab or Long term care)  [] Age </= to 50

## 2023-10-03 NOTE — PATIENT PROFILE ADULT - FUNCTIONAL ASSESSMENT - BASIC MOBILITY 1.
New Patient Intake Form   Patient Details:    Pineda Pires  1953    Appointment Information   Who is calling to schedule? Patient   If not self, what is the caller's name?   n/a   DID YOU CONFIRM INSURANCE WITH PATIENT? Yes    Referring provider Libby Stable   What is the diagnosis? Abnormal CT scan     Is there a confirmed tissue diagnosis? No     Is there a biopsy ordered or pending? Please specify dates  If yes, route to /OCC   n/a     Is patient aware of diagnosis? No     Have you had any imaging or labs done? If yes, where? (If imaging done outside of [facility], please remind patient to bring a disk ) Yes [facility] ct scan  6/14/22 and [facility] Pet scan 6/17/22     If imaging done at outside facility, did you instruct patient to obtain discs and bring to visit? Yes , also provide radiology reading room  Address  Have you been seen by another Oncologist/Hematologist?  If so, who and where? no   Are the records in [facility] or Care Everywhere? yes   Does the patient have records at another facility/hospital?    If yes, Name of facility, city and state where facility is located  Yes  [facility] PET Scan done 6/17/22  Patient has disk with imaging  Records are in care everywhere     Did you instruct patient to have records faxed to [employer] and provide [facility] number? Yes    Preferred [place]   Is the patient willing to be seen by another provider? (This is for breast patients only) n/a     Did you send new patient paperwork? Email or mail? n/a   Miscellaneous Information: Scheduled appointment 7/6/22 11:20 am , [facility]  4 = No assist / stand by assistance

## 2023-10-03 NOTE — CHART NOTE - NSCHARTNOTEFT_GEN_A_CORE
Patient s/p TTE, plan for JONNA and possible loop recorder placement as per MD   Consult for cardiology for JONNA and possible loop recorder placed   Discussed plan with MD, RN, Patient, and Significant other at bedside. Patient demonstrated understanding of plan and all questions/concerns were addressed   Will continue to monitor   RN to notify provider with any changes in patient status

## 2023-10-04 ENCOUNTER — NON-APPOINTMENT (OUTPATIENT)
Age: 53
End: 2023-10-04

## 2023-10-04 ENCOUNTER — TRANSCRIPTION ENCOUNTER (OUTPATIENT)
Age: 53
End: 2023-10-04

## 2023-10-04 VITALS
DIASTOLIC BLOOD PRESSURE: 88 MMHG | SYSTOLIC BLOOD PRESSURE: 127 MMHG | RESPIRATION RATE: 18 BRPM | OXYGEN SATURATION: 99 % | HEART RATE: 87 BPM | TEMPERATURE: 98 F

## 2023-10-04 DIAGNOSIS — I50.20 UNSPECIFIED SYSTOLIC (CONGESTIVE) HEART FAILURE: ICD-10-CM

## 2023-10-04 LAB
ANION GAP SERPL CALC-SCNC: 10 MMOL/L — SIGNIFICANT CHANGE UP (ref 5–17)
BUN SERPL-MCNC: 21.4 MG/DL — HIGH (ref 8–20)
CALCIUM SERPL-MCNC: 8.9 MG/DL — SIGNIFICANT CHANGE UP (ref 8.4–10.5)
CHLORIDE SERPL-SCNC: 103 MMOL/L — SIGNIFICANT CHANGE UP (ref 96–108)
CO2 SERPL-SCNC: 29 MMOL/L — SIGNIFICANT CHANGE UP (ref 22–29)
CREAT SERPL-MCNC: 1.54 MG/DL — HIGH (ref 0.5–1.3)
EGFR: 54 ML/MIN/1.73M2 — LOW
GLUCOSE SERPL-MCNC: 85 MG/DL — SIGNIFICANT CHANGE UP (ref 70–99)
HCT VFR BLD CALC: 45.6 % — SIGNIFICANT CHANGE UP (ref 39–50)
HGB BLD-MCNC: 14.9 G/DL — SIGNIFICANT CHANGE UP (ref 13–17)
MAGNESIUM SERPL-MCNC: 2.2 MG/DL — SIGNIFICANT CHANGE UP (ref 1.6–2.6)
MCHC RBC-ENTMCNC: 24.4 PG — LOW (ref 27–34)
MCHC RBC-ENTMCNC: 32.7 GM/DL — SIGNIFICANT CHANGE UP (ref 32–36)
MCV RBC AUTO: 74.8 FL — LOW (ref 80–100)
PLATELET # BLD AUTO: 332 K/UL — SIGNIFICANT CHANGE UP (ref 150–400)
POTASSIUM SERPL-MCNC: 4.2 MMOL/L — SIGNIFICANT CHANGE UP (ref 3.5–5.3)
POTASSIUM SERPL-SCNC: 4.2 MMOL/L — SIGNIFICANT CHANGE UP (ref 3.5–5.3)
RBC # BLD: 6.1 M/UL — HIGH (ref 4.2–5.8)
RBC # FLD: 16.6 % — HIGH (ref 10.3–14.5)
SODIUM SERPL-SCNC: 142 MMOL/L — SIGNIFICANT CHANGE UP (ref 135–145)
WBC # BLD: 6.5 K/UL — SIGNIFICANT CHANGE UP (ref 3.8–10.5)
WBC # FLD AUTO: 6.5 K/UL — SIGNIFICANT CHANGE UP (ref 3.8–10.5)

## 2023-10-04 PROCEDURE — 0042T: CPT | Mod: MA

## 2023-10-04 PROCEDURE — 70450 CT HEAD/BRAIN W/O DYE: CPT | Mod: MA

## 2023-10-04 PROCEDURE — 83036 HEMOGLOBIN GLYCOSYLATED A1C: CPT

## 2023-10-04 PROCEDURE — 70496 CT ANGIOGRAPHY HEAD: CPT | Mod: MA

## 2023-10-04 PROCEDURE — 99239 HOSP IP/OBS DSCHRG MGMT >30: CPT

## 2023-10-04 PROCEDURE — 80053 COMPREHEN METABOLIC PANEL: CPT

## 2023-10-04 PROCEDURE — 70551 MRI BRAIN STEM W/O DYE: CPT

## 2023-10-04 PROCEDURE — 85730 THROMBOPLASTIN TIME PARTIAL: CPT

## 2023-10-04 PROCEDURE — 84484 ASSAY OF TROPONIN QUANT: CPT

## 2023-10-04 PROCEDURE — C8929: CPT

## 2023-10-04 PROCEDURE — 36415 COLL VENOUS BLD VENIPUNCTURE: CPT

## 2023-10-04 PROCEDURE — 71045 X-RAY EXAM CHEST 1 VIEW: CPT

## 2023-10-04 PROCEDURE — 85025 COMPLETE CBC W/AUTO DIFF WBC: CPT

## 2023-10-04 PROCEDURE — 85610 PROTHROMBIN TIME: CPT

## 2023-10-04 PROCEDURE — 85027 COMPLETE CBC AUTOMATED: CPT

## 2023-10-04 PROCEDURE — 93320 DOPPLER ECHO COMPLETE: CPT

## 2023-10-04 PROCEDURE — 99232 SBSQ HOSP IP/OBS MODERATE 35: CPT

## 2023-10-04 PROCEDURE — 80307 DRUG TEST PRSMV CHEM ANLYZR: CPT

## 2023-10-04 PROCEDURE — 99291 CRITICAL CARE FIRST HOUR: CPT | Mod: 25

## 2023-10-04 PROCEDURE — 93005 ELECTROCARDIOGRAM TRACING: CPT

## 2023-10-04 PROCEDURE — 80061 LIPID PANEL: CPT

## 2023-10-04 PROCEDURE — 99232 SBSQ HOSP IP/OBS MODERATE 35: CPT | Mod: 25

## 2023-10-04 PROCEDURE — 93325 DOPPLER ECHO COLOR FLOW MAPG: CPT

## 2023-10-04 PROCEDURE — 82962 GLUCOSE BLOOD TEST: CPT

## 2023-10-04 PROCEDURE — 93312 ECHO TRANSESOPHAGEAL: CPT

## 2023-10-04 PROCEDURE — 83735 ASSAY OF MAGNESIUM: CPT

## 2023-10-04 PROCEDURE — 80048 BASIC METABOLIC PNL TOTAL CA: CPT

## 2023-10-04 PROCEDURE — 70498 CT ANGIOGRAPHY NECK: CPT | Mod: MA

## 2023-10-04 RX ORDER — CLOPIDOGREL BISULFATE 75 MG/1
1 TABLET, FILM COATED ORAL
Qty: 21 | Refills: 0
Start: 2023-10-04 | End: 2023-10-24

## 2023-10-04 RX ADMIN — FAMOTIDINE 20 MILLIGRAM(S): 10 INJECTION INTRAVENOUS at 18:04

## 2023-10-04 RX ADMIN — FAMOTIDINE 20 MILLIGRAM(S): 10 INJECTION INTRAVENOUS at 05:53

## 2023-10-04 RX ADMIN — ENOXAPARIN SODIUM 40 MILLIGRAM(S): 100 INJECTION SUBCUTANEOUS at 14:08

## 2023-10-04 RX ADMIN — SACUBITRIL AND VALSARTAN 1 TABLET(S): 24; 26 TABLET, FILM COATED ORAL at 05:53

## 2023-10-04 RX ADMIN — Medication 20 MILLIGRAM(S): at 05:53

## 2023-10-04 RX ADMIN — Medication 50 MILLIGRAM(S): at 05:58

## 2023-10-04 RX ADMIN — CLOPIDOGREL BISULFATE 75 MILLIGRAM(S): 75 TABLET, FILM COATED ORAL at 14:08

## 2023-10-04 RX ADMIN — Medication 81 MILLIGRAM(S): at 14:08

## 2023-10-04 RX ADMIN — SACUBITRIL AND VALSARTAN 1 TABLET(S): 24; 26 TABLET, FILM COATED ORAL at 18:04

## 2023-10-04 NOTE — PROGRESS NOTE ADULT - PROBLEM SELECTOR PLAN 1
- pt presented with dysphagia x4 episodes  - CT head negative. MRI head found to have acute left insular infarct.  - SBP goal between 120-150 mmHg as per neuro rec.   - c/w ASA 81mg PO daily, Clopidogrel 75 mg PO daily x 21 days per neuro recs.   - c/w statin 80 mg PO daily. Goal LDL <70. LDL is 80.   - NPO since midnight for JONNA today.  - Patient will need to follow up outpatient for 30 day MCOT. Per discussion with Dr Hernandez, patient will follow up with EP outpatient for evaluation of possible ICD.

## 2023-10-04 NOTE — PROGRESS NOTE ADULT - SUBJECTIVE AND OBJECTIVE BOX
[facility]                                                         CARDIOLOGY AT [address], [place] [address]                                                         Telephone: [phone]. Fax:[phone]                                                                             PROGRESS NOTE    Reason for follow up:  Stroke Etiology   Update:  NPO since midnight for JONNA.       Review of symptoms:   Cardiac:  No chest pain. No dyspnea. No palpitations.  Respiratory: no cough. No dyspnea  Gastrointestinal: No diarrhea. No abdominal pain. No bleeding.   Neuro: No focal neuro complaints.    Vitals:  T(C): 36.9 (10-04-23 @ 04:46), Max: 36.9 (10-04-23 @ 04:46)  HR: 90 (10-04-23 @ 04:46) (90 - 107)  BP: 143/90 (10-04-23 @ 04:46) (137/94 - 143/90)  RR: 18 (10-04-23 @ 04:46) (18 - 18)  SpO2: 99% (10-04-23 @ 04:46) (97% - 99%)  Wt(kg): --  I&O's Summary    03 Oct 2023 07:01  -  04 Oct 2023 07:00  --------------------------------------------------------  IN: 900 mL / OUT: 0 mL / NET: 900 mL      Weight (kg): 94.2 (10-03 @ 11:40)    PHYSICAL EXAM:  Appearance: Comfortable. No acute distress  HEENT:  Atraumatic. Normocephalic.  Normal oral mucosa  Neurologic: A & O x 3, no gross focal deficits.  Cardiovascular: RRR S1 S2, No murmur, no rubs/gallops. No JVD  Respiratory: Lungs clear to auscultation, unlabored   Gastrointestinal:  Soft, Non-tender, + BS  Lower Extremities: 2+ Peripheral Pulses, No clubbing, cyanosis, or edema  Psychiatry: Patient is calm. No agitation.   Skin: warm and dry.    CURRENT CARDIAC MEDICATIONS:  metoprolol succinate ER 50 milliGRAM(s) Oral daily  sacubitril 49 mG/valsartan 51 mG 1 Tablet(s) Oral two times a day  torsemide 20 milliGRAM(s) Oral daily      CURRENT OTHER MEDICATIONS:  acetaminophen     Tablet .. 650 milliGRAM(s) Oral every 6 hours PRN Temp greater or equal to 38C (100.4F), Mild Pain (1 - 3)  melatonin 3 milliGRAM(s) Oral at bedtime PRN Insomnia  ondansetron Injectable 4 milliGRAM(s) IV Push every 8 hours PRN Nausea and/or Vomiting  aluminum hydroxide/magnesium hydroxide/simethicone Suspension 30 milliLiter(s) Oral every 4 hours PRN Dyspepsia  famotidine    Tablet 20 milliGRAM(s) Oral two times a day  atorvastatin 80 milliGRAM(s) Oral at bedtime  aspirin enteric coated 81 milliGRAM(s) Oral daily  clopidogrel Tablet 75 milliGRAM(s) Oral daily  enoxaparin Injectable 40 milliGRAM(s) SubCutaneous every 24 hours      LABS:	 	  ( 01 Oct 2023 01:58 )  Troponin T  <0.01,  CPK  X    , CKMB  X    , BNP X                                  14.9   6.50  )-----------( 332      ( 04 Oct 2023 06:32 )             45.6     10-04    142  |  103  |  21.4<H>  ----------------------------<  85  4.2   |  29.0  |  1.54<H>    Ca    8.9      04 Oct 2023 06:32  Mg     2.2     10-04      PT/INR/PTT ( 01 Oct 2023 01:58 )                       :                       :      13.1         :       30.7                  .        .                   .              .           .       1.19        .                                       Lipid Profile: Date: 10-01 @ 01:58  Total cholesterol 127; Direct LDL: --; HDL: 38; Triglycerides:43    HgA1c: 5.5%   TSH:     TELEMETRY: NSR   ECG: Sinus tachycardia     DIAGNOSTIC TESTING:  [ x] Echocardiogram:   < from: TTE Echo Complete w/ Contrast w/ Doppler (10.03.23 @ 11:44) >   1. Left ventricular ejection fraction, by visual estimation, is <20%.   2. Severely decreased global left ventricular systolic function.   3. Severely enlarged left atrium.   4. Severely enlarged right atrium.   5. Mildly reduced RV systolic function.   6. Moderately enlarged right ventricle.   7. Moderate mitral valve regurgitation.   8. Mild-moderate tricuspid regurgitation.   9. Sclerotic aortic valve with normal opening.  10. Estimated pulmonary artery systolic pressure is 35.5 mmHg assuming a   right atrial pressure of 3 mmHg, which is consistent with borderline   pulmonary hypertension.  11. Endocardial visualization was enhanced with intravenous echo contrast.    < end of copied text >  [ ]  Catheterization:  [ ] Stress Test:    OTHER: 	    < from: MR Head No Cont (10.02.23 @ 21:38) >  Acute left insular infarct. No acute intracranial hemorrhage.    < end of copied text >  < from: Xray Chest 1 View- PORTABLE-Urgent (10.01.23 @ 03:20) >  Cardiomegaly and increased interstitial markings may   represent acute and/or chronic vascular congestion    < end of copied text >

## 2023-10-04 NOTE — PROGRESS NOTE ADULT - SUBJECTIVE AND OBJECTIVE BOX
Preliminary note, offical recommendations pending attending review/signature     [facility] Stroke Team    CC: slurred speech and word finding difficulty  HPI:  Patient is a 53 year old male with PMHx of HTN, CHFrEF, GERD, and HLD who presented to [facility] on 10/1/23 due to 4 episodes of transient slurred speech and difficulty getting the words out. Patient stated that at 8:00 pm on 9/30/23 patient's wife noticed that he was slurry his words. Patient also stated that he was having trouble getting the words out,even though he knew what he wanted to say. Episode lasted around 2-3 minutes. A few minutes later patient experienced another similar episode. Throughout the night patient had another 2 episodes of the exact nature which prompted him to come to the ED. Stroke team called for consult. On presentation patient stated to no longer have any symptoms and had not had an episode since. Denied any weakness or tingling.  Did admit to missing some of his hypertensive medications recently. SBP on admission was 195. States to take ASA 81mg daily.     SUBJECTIVE: No events overnight.  No new neurologic complaints.  ROS reported negative unless otherwise noted.    acetaminophen     Tablet .. 650 milliGRAM(s) Oral every 6 hours PRN  aluminum hydroxide/magnesium hydroxide/simethicone Suspension 30 milliLiter(s) Oral every 4 hours PRN  aspirin enteric coated 81 milliGRAM(s) Oral daily  atorvastatin 80 milliGRAM(s) Oral at bedtime  clopidogrel Tablet 75 milliGRAM(s) Oral daily  enoxaparin Injectable 40 milliGRAM(s) SubCutaneous every 24 hours  famotidine    Tablet 20 milliGRAM(s) Oral two times a day  melatonin 3 milliGRAM(s) Oral at bedtime PRN  metoprolol succinate ER 50 milliGRAM(s) Oral daily  ondansetron Injectable 4 milliGRAM(s) IV Push every 8 hours PRN  sacubitril 49 mG/valsartan 51 mG 1 Tablet(s) Oral two times a day  torsemide 20 milliGRAM(s) Oral daily      PHYSICAL EXAM:   Vital Signs Last 24 Hrs  T(C): 36.9 (04 Oct 2023 10:28), Max: 36.9 (04 Oct 2023 04:46)  T(F): 98.4 (04 Oct 2023 10:28), Max: 98.5 (04 Oct 2023 04:46)  HR: 85 (04 Oct 2023 14:00) (82 - 95)  BP: 133/89 (04 Oct 2023 14:00) (117/89 - 143/90)  BP(mean): --  RR: 17 (04 Oct 2023 14:00) (16 - 18)  SpO2: 97% (04 Oct 2023 14:00) (95% - 100%)    Parameters below as of 04 Oct 2023 14:00  Patient On (Oxygen Delivery Method): room air        General: appears slightly anxious, like to move on with necessary testing. Fiance is at the bedside.      Detailed Neurologic Exam:    Mental status: The patient is awake and alert and has normal attention span.  The patient is fully oriented to self, place, year, and month. The patient is able to name objects and their function, follow commands, and repeat sentences.    Cranial nerves: No dysarthria noted Pupils equal and react symmetrically to light. There is no visual field deficit to confrontation. Extraocular motion is full with no nystagmus. There is no ptosis. Facial sensation is intact. Facial musculature is symmetric. Tongue is midline.    Motor: There is normal bulk and tone.  There is no tremor.  Strength is 5/5 in the right arm and leg, no drift  Strength is 5/5 in the left arm and leg, no drift    Sensation: Intact to light touch in 4 extremities    Cerebellar: There is no dysmetria on finger to nose testing.    Gait : deferred              LABS:                        14.9   6.50  )-----------( 332      ( 04 Oct 2023 06:32 )             45.6    10-04    142  |  103  |  21.4<H>  ----------------------------<  85  4.2   |  29.0  |  1.54<H>    Ca    8.9      04 Oct 2023 06:32  Mg     2.2     10-04          Lipid panel: Lipid Profile (10.01.23 @ 01:58)    Cholesterol: 127 mg/dL   Triglycerides, Serum: 43: Lipemic. Interpret with caution mg/dL   HDL Cholesterol: 38 mg/dL   Non HDL Cholesterol: 89   LDL Cholesterol Calculated: 80 mg/dL    HgbA1c: A1C with Estimated Average Glucose (10.01.23 @ 01:58)    A1C with Estimated Average Glucose Result: 5.5 %   Estimated Average Glucose: 111 mg/dL        RADIOLOGY & ADDITIONAL STUDIES (independently reviewed unless otherwise noted):     JONNA Echo Doppler (10.04.23 @ 12:42)   1. Left ventricular ejection fraction, by visual estimation, is <20%.   2. Severely decreased global left ventricular systolic function.   3. Increased LV wall thickness.   4. There is mild eccentric left ventricular hypertrophy.   5. Mildly enlarged right ventricle.   6. Moderately reduced RV systolic function.   7. Mild thickening of the anterior and posterior mitral valve leaflets.   8. Moderate mitral valve regurgitation.   9. Moderate tricuspid regurgitation.  10. Mild pulmonic valve regurgitation.    Tino, Electronically signed on 10/4/2023 at 1:43:14 PM    MR Head No Cont (10.02.23 @ 21:38)   Acute left insular infarct. No acute intracranial hemorrhage.    CT Brain Stroke Protocol (10.01.23 @ 01:47)   IMPRESSION:  No acute intracranial hemorrhage.  Focal hypoattenuation in the region of the left frontal operculum, which   may reflect acute or subacute infarct.  Consider MRI for further assessment if not contraindicated.    CT Angio Neck Stroke Protocol w/ IV Cont (10.01.23 @ 01:54)   IMPRESSION:  CTA head/neck: No proximal large vessel occlusion, high-grade stenosis,   or dissection.  CT perfusion: No core infarct identified. Rapid analysis shows 6 cc of   decreased perfusion in the right frontal and left occipital region, which   may be artifactual.  MRI may be considered for more sensitive evaluation for acute infarction   if symptoms persist.  A few patchy groundglass opacities in the right greater than left lung   apices, nonspecific, possibly infectious, correlate clinically.      TTE Echo Complete w/ Contrast w/ Doppler (06.16.22 @ 14:15) *********************    Summary:   1. Severely increased left ventricular internal cavity size.   2. Severely decreased global left ventricular systolic function.   3. Left ventricular ejection fraction, by visual estimation, is <20%.   4. Dilated cardiomyopathy.   5. Moderately enlarged right ventricle.   6. Mildly increased RV wall thickness.   7. Moderately reduced RV systolic function.   8. Moderate to severe right atrial enlargement.   9. Severely enlarged left atrium.  10. Mild thickening of the anterior and posterior mitral valve leaflets.  11. Moderate to severe mitral valve regurgitation.  12. Moderate tricuspid regurgitation.  13. Normal trileaflet aortic valve with normal opening.  14. Estimated pulmonary artery systolic pressure is 38.2 mmHg assuming a   right atrial pressure of 8 mmHg, which is consistent with borderline   pulmonary hypertension.  15. There is no evidence of pericardial effusion.  16. Endocardial visualization was enhanced with intravenous echo contrast.           [facility] Stroke Team    CC: slurred speech and word finding difficulty  HPI:  Patient is a 53 year old male with PMHx of HTN, CHFrEF, GERD, and HLD who presented to [facility] on 10/1/23 due to 4 episodes of transient slurred speech and difficulty getting the words out. Patient stated that at 8:00 pm on 9/30/23 patient's wife noticed that he was slurry his words. Patient also stated that he was having trouble getting the words out,even though he knew what he wanted to say. Episode lasted around 2-3 minutes. A few minutes later patient experienced another similar episode. Throughout the night patient had another 2 episodes of the exact nature which prompted him to come to the ED. Stroke team called for consult. On presentation patient stated to no longer have any symptoms and had not had an episode since. Denied any weakness or tingling.  Did admit to missing some of his hypertensive medications recently. SBP on admission was 195. States to take ASA 81mg daily.     SUBJECTIVE: No events overnight.  No new neurologic complaints.  ROS reported negative unless otherwise noted.    acetaminophen     Tablet .. 650 milliGRAM(s) Oral every 6 hours PRN  aluminum hydroxide/magnesium hydroxide/simethicone Suspension 30 milliLiter(s) Oral every 4 hours PRN  aspirin enteric coated 81 milliGRAM(s) Oral daily  atorvastatin 80 milliGRAM(s) Oral at bedtime  clopidogrel Tablet 75 milliGRAM(s) Oral daily  enoxaparin Injectable 40 milliGRAM(s) SubCutaneous every 24 hours  famotidine    Tablet 20 milliGRAM(s) Oral two times a day  melatonin 3 milliGRAM(s) Oral at bedtime PRN  metoprolol succinate ER 50 milliGRAM(s) Oral daily  ondansetron Injectable 4 milliGRAM(s) IV Push every 8 hours PRN  sacubitril 49 mG/valsartan 51 mG 1 Tablet(s) Oral two times a day  torsemide 20 milliGRAM(s) Oral daily      PHYSICAL EXAM:   Vital Signs Last 24 Hrs  T(C): 36.9 (04 Oct 2023 10:28), Max: 36.9 (04 Oct 2023 04:46)  T(F): 98.4 (04 Oct 2023 10:28), Max: 98.5 (04 Oct 2023 04:46)  HR: 85 (04 Oct 2023 14:00) (82 - 95)  BP: 133/89 (04 Oct 2023 14:00) (117/89 - 143/90)  BP(mean): --  RR: 17 (04 Oct 2023 14:00) (16 - 18)  SpO2: 97% (04 Oct 2023 14:00) (95% - 100%)    Parameters below as of 04 Oct 2023 14:00  Patient On (Oxygen Delivery Method): room air        General: appears slightly anxious, like to move on with necessary testing. Fiance is at the bedside.      Detailed Neurologic Exam:    Mental status: The patient is awake and alert and has normal attention span.  The patient is fully oriented to self, place, year, and month. The patient is able to name objects and their function, follow commands, and repeat sentences.    Cranial nerves: No dysarthria noted Pupils equal and react symmetrically to light. There is no visual field deficit to confrontation. Extraocular motion is full with no nystagmus. There is no ptosis. Facial sensation is intact. Facial musculature is symmetric. Tongue is midline.    Motor: There is normal bulk and tone.  There is no tremor.  Strength is 5/5 in the right arm and leg, no drift  Strength is 5/5 in the left arm and leg, no drift    Sensation: Intact to light touch in 4 extremities    Cerebellar: There is no dysmetria on finger to nose testing.    Gait : deferred              LABS:                        14.9   6.50  )-----------( 332      ( 04 Oct 2023 06:32 )             45.6    10-04    142  |  103  |  21.4<H>  ----------------------------<  85  4.2   |  29.0  |  1.54<H>    Ca    8.9      04 Oct 2023 06:32  Mg     2.2     10-04          Lipid panel: Lipid Profile (10.01.23 @ 01:58)    Cholesterol: 127 mg/dL   Triglycerides, Serum: 43: Lipemic. Interpret with caution mg/dL   HDL Cholesterol: 38 mg/dL   Non HDL Cholesterol: 89   LDL Cholesterol Calculated: 80 mg/dL    HgbA1c: A1C with Estimated Average Glucose (10.01.23 @ 01:58)    A1C with Estimated Average Glucose Result: 5.5 %   Estimated Average Glucose: 111 mg/dL        RADIOLOGY & ADDITIONAL STUDIES (independently reviewed unless otherwise noted):     JONNA Echo Doppler (10.04.23 @ 12:42)   1. Left ventricular ejection fraction, by visual estimation, is <20%.   2. Severely decreased global left ventricular systolic function.   3. Increased LV wall thickness.   4. There is mild eccentric left ventricular hypertrophy.   5. Mildly enlarged right ventricle.   6. Moderately reduced RV systolic function.   7. Mild thickening of the anterior and posterior mitral valve leaflets.   8. Moderate mitral valve regurgitation.   9. Moderate tricuspid regurgitation.  10. Mild pulmonic valve regurgitation.    Tino, Electronically signed on 10/4/2023 at 1:43:14 PM    MR Head No Cont (10.02.23 @ 21:38)   Acute left insular infarct. No acute intracranial hemorrhage.    CT Brain Stroke Protocol (10.01.23 @ 01:47)   IMPRESSION:  No acute intracranial hemorrhage.  Focal hypoattenuation in the region of the left frontal operculum, which   may reflect acute or subacute infarct.  Consider MRI for further assessment if not contraindicated.    CT Angio Neck Stroke Protocol w/ IV Cont (10.01.23 @ 01:54)   IMPRESSION:  CTA head/neck: No proximal large vessel occlusion, high-grade stenosis,   or dissection.  CT perfusion: No core infarct identified. Rapid analysis shows 6 cc of   decreased perfusion in the right frontal and left occipital region, which   may be artifactual.  MRI may be considered for more sensitive evaluation for acute infarction   if symptoms persist.  A few patchy groundglass opacities in the right greater than left lung   apices, nonspecific, possibly infectious, correlate clinically.      TTE Echo Complete w/ Contrast w/ Doppler (06.16.22 @ 14:15) *********************    Summary:   1. Severely increased left ventricular internal cavity size.   2. Severely decreased global left ventricular systolic function.   3. Left ventricular ejection fraction, by visual estimation, is <20%.   4. Dilated cardiomyopathy.   5. Moderately enlarged right ventricle.   6. Mildly increased RV wall thickness.   7. Moderately reduced RV systolic function.   8. Moderate to severe right atrial enlargement.   9. Severely enlarged left atrium.  10. Mild thickening of the anterior and posterior mitral valve leaflets.  11. Moderate to severe mitral valve regurgitation.  12. Moderate tricuspid regurgitation.  13. Normal trileaflet aortic valve with normal opening.  14. Estimated pulmonary artery systolic pressure is 38.2 mmHg assuming a   right atrial pressure of 8 mmHg, which is consistent with borderline   pulmonary hypertension.  15. There is no evidence of pericardial effusion.  16. Endocardial visualization was enhanced with intravenous echo contrast.

## 2023-10-04 NOTE — DISCHARGE NOTE NURSING/CASE MANAGEMENT/SOCIAL WORK - PATIENT PORTAL LINK FT
You can access the FollowMyHealth Patient Portal offered by [facility] by registering at the following website: [email or web address]. By joining [employer]’s FollowMyHealth portal, you will also be able to view your health information using other applications (apps) compatible with our system.

## 2023-10-04 NOTE — CONSULT NOTE ADULT - ASSESSMENT
s/p JONNA with Dr. Caity Elizabeth revealing EF 15-20%; No cardioembolic source of CVA (PRELIMINARY VERBAL REPORT FROM DR. CAITY ELIZABETH; PENDING OFFICIAL REPORT).    PLAN:  -Vital signs:        Every 15 minutes times 4 sets,       then every 30 minutes until Margie score returns to baseline,        then as per unit routine.  -Bedrest for 1 hour.  -Ok for PO intake/diet when patient more awake.

## 2023-10-04 NOTE — CONSULT NOTE ADULT - SUBJECTIVE AND OBJECTIVE BOX
Department of Cardiology                                                                  [facility]/[place] [address] [place] [address]                                                            Telephone: [phone]. Fax:[phone]                                                                                     Pre-JONNA Note        Narrative:    Patient is a 53 year old male with PMHx of HTN, CHFrEF, GERD, and HLD who presented to [facility] on 10/1/23 due to 4 episodes of transient slurred speech and difficulty getting the words out. Patient stated that at 8:00 pm on 9/30/23 patient's wife noticed that he was slurry his words. Patient also stated that he was having trouble getting the words out,even though he knew what he wanted to say. Episode lasted around 2-3 minutes. A few minutes later patient experienced another similar episode. Throughout the night patient had another 2 episodes of the exact nature which prompted him to come to the ED. Stroke team called for consult. On presentation patient stated to no longer have any symptoms and had not had an episode since. Denied any weakness or tingling.  Did admit to missing some of his hypertensive medications recently. SBP on admission was 195. States to take ASA 81mg daily. CTH in ED negative for acute stroke, CTA: no LVO: CTP: negative; MRI + acute left insular infarct. TTE performed:  Left ventricular ejection fraction, by visual estimation, is <20%. Severely decreased global left ventricular systolic function. Severely enlarged left atrium. Severely enlarged right atrium.  Mildly reduced RV systolic function. Moderately enlarged right ventricle. Moderate mitral valve regurgitation. Mild-moderate tricuspid regurgitation. borderline pulmonary hypertension. Patient presented to [facility] for JONNA for CVA work up.     ASA and Mallampati: Per Anesthesia    	  MEDICATIONS:  metoprolol succinate ER 50 milliGRAM(s) Oral daily  sacubitril 49 mG/valsartan 51 mG 1 Tablet(s) Oral two times a day  torsemide 20 milliGRAM(s) Oral daily        acetaminophen     Tablet .. 650 milliGRAM(s) Oral every 6 hours PRN  melatonin 3 milliGRAM(s) Oral at bedtime PRN  ondansetron Injectable 4 milliGRAM(s) IV Push every 8 hours PRN    aluminum hydroxide/magnesium hydroxide/simethicone Suspension 30 milliLiter(s) Oral every 4 hours PRN  famotidine    Tablet 20 milliGRAM(s) Oral two times a day    atorvastatin 80 milliGRAM(s) Oral at bedtime    aspirin enteric coated 81 milliGRAM(s) Oral daily  clopidogrel Tablet 75 milliGRAM(s) Oral daily  enoxaparin Injectable 40 milliGRAM(s) SubCutaneous every 24 hours        PHYSICAL EXAM:    T(C): 36.9 (10-04-23 @ 10:28), Max: 36.9 (10-04-23 @ 04:46)  HR: 93 (10-04-23 @ 10:28) (90 - 95)  BP: 131/87 (10-04-23 @ 10:28) (131/87 - 143/90)  RR: 16 (10-04-23 @ 10:28) (16 - 18)  SpO2: 100% (10-04-23 @ 10:28) (97% - 100%)  Wt(kg): --    I&O's Summary    03 Oct 2023 07:01  -  04 Oct 2023 07:00  --------------------------------------------------------  IN: 900 mL / OUT: 0 mL / NET: 900 mL        Daily     Daily     Constitutional: A & O x 3  HEENT:   Normal oral mucosa, PERRL, EOMI	  Cardiovascular: Normal S1 S2, No JVD, No murmurs, No edema  Respiratory: Lungs clear to auscultation	  Gastrointestinal:  Soft, Non-tender, + BS	  Skin: No rashes, No ecchymoses, No cyanosis  Neurologic: Non-focal  Extremities: Normal range of motion, No clubbing, cyanosis or edema  Vascular: Peripheral pulses palpable 2+ bilaterally    TELEMETRY: Sinus Rhythm  ECG:  < from: 12 Lead ECG (10.01.23 @ 02:12) >    Diagnosis Line Sinus tachycardia  Possible Left atrial enlargement  Cannot rule out Anterior infarct , age undetermined  Abnormal ECG    < end of copied text >  	  RADIOLOGY:     DIAGNOSTIC TESTING:  [ X] Echocardiogram: < from: TTE Echo Complete w/ Contrast w/ Doppler (10.03.23 @ 11:44) >    Summary:   1. Left ventricular ejection fraction, by visual estimation, is <20%.   2. Severely decreased global left ventricular systolic function.   3. Severely enlarged left atrium.   4. Severely enlarged right atrium.   5. Mildly reduced RV systolic function.   6. Moderately enlarged right ventricle.   7. Moderate mitral valve regurgitation.   8. Mild-moderate tricuspid regurgitation.   9. Sclerotic aortic valve with normal opening.  10. Estimated pulmonary artery systolic pressure is 35.5 mmHg assuming a   right atrial pressure of 3 mmHg, which is consistent with borderline   pulmonary hypertension.  11. Endocardial visualization was enhanced with intravenous echo contrast.    MD Bryan Electronically signed on 10/3/2023 at 2:43:31 PM    < end of copied text >    [ x] CT: < from: CT Angio Brain Stroke Protocol  w/ IV Cont (10.01.23 @ 01:53) >  IMPRESSION:    CTA head/neck: No proximal large vessel occlusion, high-grade stenosis,   or dissection.    CT perfusion: No core infarct identified. Rapid analysis shows 6 cc of   decreased perfusion in the right frontal and left occipital region, which   may be artifactual.    < end of copied text >    [ x} MRI: < from: MR Head No Cont (10.02.23 @ 21:38) >  IMPRESSION:    Acute left insular infarct. No acute intracranial hemorrhage.    --- End of Report ---    < end of copied text >      LABS:	 	    CARDIAC MARKERS:                 14.9   6.50  )-----------( 332      ( 04 Oct 2023 06:32 )             45.6     10-04    142  |  103  |  21.4<H>  ----------------------------<  85  4.2   |  29.0  |  1.54<H>    Ca    8.9      04 Oct 2023 06:32  Mg     2.2     10-04        ASSESSMENT:  -JONNA as ordered  -Procedure discussed with patient; risks and benefits explained; questions answered  -Labs and ECG reviewed  -Anesthesia and Cardiology aware that patient received Farxiga yesterday

## 2023-10-04 NOTE — PROGRESS NOTE ADULT - ASSESSMENT
52 y/o male with PMH of HTN, CHFrEF, GERD, HLD came to the ED complaining of slurred speech.   Symptom started around 8PM while he was eating dinner with his significant other; it happened for a few second but happened again; 4 episodes in total.    He has no other associated symptoms.   CT negative for acute sroke or hemorrhage.   MRI Brain revealed acute left insular infarct.   Cardiac consult called for stroke etiology  [place] headache, dizziness, no blurry vision, chest pain, palpitations, sob, pnd, orthopnea, fever, chills, syncope, presyncope, weakness, numbness/tingling, n/v/d/c/ abdominal pain, change in bowel/urinary habit, recent travel, sick contacts.   Cardiology consulted for stroke etiology.

## 2023-10-04 NOTE — DISCHARGE NOTE NURSING/CASE MANAGEMENT/SOCIAL WORK - NSDCPEFALRISK_GEN_ALL_CORE
For information on Fall & Injury Prevention, visit: [email or web address] OR  [email or web address] OR  https://www.cdc.gov/steadi/patient.html

## 2023-10-04 NOTE — PROGRESS NOTE ADULT - NS ATTEND AMEND GEN_ALL_CORE FT
Patient was seen and examined by me. History and exam as documented above by PA/NP was confirmed by me.  Agree with plan as outlined above.
Patient seen and examined by me.      Patient alert and awake.  Chest- Bilateral Clear BS  Cardiac- S1 and S2  Abdomen- Soft    Assessment/Plan:  1. CVA  2. HFrEF    For JONNA   Patient is advised to f/u in the office with me and EP  I have discussed my recommendation with the PA which are outlined above.  Will sign off
Patient was seen and examined by me. History and exam as documented above by PA/NP was confirmed by me.  Agree with plan as outlined above.

## 2023-10-04 NOTE — PROGRESS NOTE ADULT - ASSESSMENT
Assessment and Plan:   Assessment and Recommendation: 	  ASSESSMENT: Patient is a 53 year old male with PMHx of HTN, CHFrEF, GERD, and HLD who presented to [facility] on 10/1/23 due to 4 episodes of transient dysarthria and difficulty getting the words out. Patient stated that at 8:00 pm on 9/30/23 patient's wife noticed that he was slurry his words. Patient also stated that he was having trouble getting the words out,even though he knew what he wanted to say. Episode lasted around 2-3 minutes. A few minutes later patient experienced another similar episode. Throughout the night patient had another 2 episodes of the exact nature which prompted him to come to the ED. SBP on admittion was in the 190s and patient did admit to missing some of his hypertensive medications recently. Initial head CT demonstrated focal hypoattenuation in the region of the left frontal operculum, which may reflect acute or subacute infarct. CTA head and neck revealed no large vessel occlusion, high grade stenosis or dissection. CTP did demonstrate 6 cc of decreased perfusion in the right frontal and left occipital region, which may be artifactual. Patient not a Tenecteplase candidate due to resolution of symptoms with NIHSS of 0. Patient not a mechanical thrombectomy candidate due to no presence of a large vessel occlusion. . 10/2/23 MRI head revealed acute left insular infarct. 10/4/23 JONNA w/o thrombus, w/o PFO. Etiology: stroke of undetermined cause.          NEURO:   -Neurologically back to baseline as per patient and fiance at bedside. Patient with nonfocal neurological exam  -Continue close monitoring for neurologic deterioration    - Stroke neuro checks q 4 hour  -MRI Brain revealed acute left insular infarct  -TTE  noted  -TTE w/o thrombus w/o PFO, EF 20%  - would recommend hypercoagulable panel, CT C/A/P   - SBP goal 120-150 mmHg. Avoid rapid fluctuations and hypotension.  -Risk factor modification once discharged. Patient was strongly advised that he should monitor and record blood pressure twice a day   -ANTITHROMBOTIC THERAPY: ASA 81mg PO daily, Xarelto 2.5 mg PO BID in settings of low EF (20%)   -titrate statin to LDL goal less than 70. LDL is 80. Suggest high intensity statin as long as no medical contraindications  -Dysphagia screen: Pass. DASH diet, tolerating well  -Physical therapy/OT/Speech eval/treatment.     -CARDIOVASCULAR:  TTE noted, JONNA w/o thrombus or PFO. Cardiology recommended MCOT 30 days and o/p eval for ICD   HF team consult in settings of low EF.  Continue cardiac monitoring w/ telemetry for now.                  -HEMATOLOGY: H/H 14.9/ 45.6 Platelets 337,   Would recommend hypercoagulable panel and CT C/A/P       DVT ppx: Heparin s.c [] LMWH [x]     PULMONARY:  protecting airway, saturating well on room air    RENAL: BUN/Cr 21.4/1.54 renal consult in settings of elevated creatinine, monitor urine output, maintain adequate hydration       Na Goal:  135-145    ID: afebrile, no leukocytosis, monitor for si/sx of infection     OTHER:  condition and plan of care d/w patient and fiance at bedside, questions and concerns addressed.   -Patient states to feel very anxious and have a hx of anxiety. Suggest possible behavorial health consult  -Patient strongly counseled the importance of taking his medications as prescribed and the risks that could come form not doing so.   -SW and care manager to explore Xarelto coverage by insured     DISPOSITION:  home depending on PT eval once stable and workup is complete      CORE MEASURES:        Admission NIHSS: 0     Tenecteplase : [] YES [x] NO      LDL/HDL/A1C: 80/38/5.5%     Depression Screen- if depression hx and/or present      Statin Therapy: as noted     Dysphagia Screen: [x] PASS [] FAIL     Smoking [] YES [x] NO      Afib [] YES [x] NO     Stroke Education [] YES [] NO- ordered and pending    Obtain screening lower extremity venous ultrasound in patients who meet 1 or more of the following criteria as patient is high risk for DVT/PE on admission:   [] History of DVT/PE  []Hypercoagulable states (Factor V Leiden, Cancer, OCP, etc. )  []Prolonged immobility (hemiplegia/hemiparesis/post operative or any other extended immobilization)  [] Transferred from outside facility (Rehab or Long term care)  [] Age </= to 50

## 2023-10-04 NOTE — PROGRESS NOTE ADULT - PROBLEM SELECTOR PLAN 2
- TTE on 10/3/2023: EF <20%. Severely decreased LV function. Moderate MR. Mild-moderate TR.    - Previous TTE on 6/2022 EF <20%, severely decreased LV function.   - GDMT: c/w entresto 49/51 mg PO daily, metoprolol 50 mg PO daily, and torsemide 20 mg PO daily.   - LHC from [facility] showed non-obstructive CAD   - Discussed with EP Dr Hernandez, patient will follow up with EP outpatient for evaluation of a possible ICD.

## 2023-10-19 ENCOUNTER — APPOINTMENT (OUTPATIENT)
Dept: NEUROLOGY | Facility: CLINIC | Age: 53
End: 2023-10-19

## 2023-10-25 ENCOUNTER — APPOINTMENT (OUTPATIENT)
Dept: HEART FAILURE | Facility: CLINIC | Age: 53
End: 2023-10-25
Payer: SELF-PAY

## 2023-10-25 VITALS
DIASTOLIC BLOOD PRESSURE: 92 MMHG | OXYGEN SATURATION: 99 % | BODY MASS INDEX: 29.59 KG/M2 | HEIGHT: 70.5 IN | HEART RATE: 83 BPM | SYSTOLIC BLOOD PRESSURE: 140 MMHG | WEIGHT: 209 LBS

## 2023-10-25 DIAGNOSIS — I10 ESSENTIAL (PRIMARY) HYPERTENSION: ICD-10-CM

## 2023-10-25 PROCEDURE — 99214 OFFICE O/P EST MOD 30 MIN: CPT

## 2023-10-25 RX ORDER — CARVEDILOL 6.25 MG/1
6.25 TABLET, FILM COATED ORAL TWICE DAILY
Qty: 60 | Refills: 5 | Status: ACTIVE | COMMUNITY
Start: 2023-10-25 | End: 1900-01-01

## 2023-10-25 RX ORDER — METOPROLOL SUCCINATE 50 MG/1
50 TABLET, EXTENDED RELEASE ORAL DAILY
Qty: 30 | Refills: 5 | Status: DISCONTINUED | COMMUNITY
Start: 2022-07-13 | End: 2023-10-25

## 2023-10-25 RX ORDER — CLOPIDOGREL BISULFATE 300 MG/1
TABLET, FILM COATED ORAL DAILY
Refills: 0 | Status: ACTIVE | COMMUNITY

## 2023-11-13 ENCOUNTER — APPOINTMENT (OUTPATIENT)
Dept: CARDIOLOGY | Facility: CLINIC | Age: 53
End: 2023-11-13

## 2023-11-28 ENCOUNTER — APPOINTMENT (OUTPATIENT)
Dept: ELECTROPHYSIOLOGY | Facility: CLINIC | Age: 53
End: 2023-11-28

## 2023-11-29 ENCOUNTER — APPOINTMENT (OUTPATIENT)
Dept: HEART FAILURE | Facility: CLINIC | Age: 53
End: 2023-11-29

## 2023-12-13 ENCOUNTER — APPOINTMENT (OUTPATIENT)
Dept: NEUROLOGY | Facility: CLINIC | Age: 53
End: 2023-12-13

## 2023-12-20 ENCOUNTER — APPOINTMENT (OUTPATIENT)
Dept: HEART FAILURE | Facility: CLINIC | Age: 53
End: 2023-12-20
Payer: SELF-PAY

## 2023-12-20 VITALS
BODY MASS INDEX: 31.37 KG/M2 | HEART RATE: 85 BPM | TEMPERATURE: 97.7 F | SYSTOLIC BLOOD PRESSURE: 126 MMHG | OXYGEN SATURATION: 96 % | HEIGHT: 70.5 IN | WEIGHT: 221.6 LBS | DIASTOLIC BLOOD PRESSURE: 78 MMHG

## 2023-12-20 DIAGNOSIS — I50.20 UNSPECIFIED SYSTOLIC (CONGESTIVE) HEART FAILURE: ICD-10-CM

## 2023-12-20 PROCEDURE — 99214 OFFICE O/P EST MOD 30 MIN: CPT

## 2023-12-20 RX ORDER — SACUBITRIL AND VALSARTAN 49; 51 MG/1; MG/1
49-51 TABLET, FILM COATED ORAL
Qty: 60 | Refills: 5 | Status: COMPLETED | COMMUNITY
Start: 2022-07-13 | End: 2023-12-20

## 2023-12-20 RX ORDER — SACUBITRIL AND VALSARTAN 97; 103 MG/1; MG/1
97-103 TABLET, FILM COATED ORAL TWICE DAILY
Qty: 180 | Refills: 3 | Status: ACTIVE | COMMUNITY
Start: 2023-12-20 | End: 1900-01-01

## 2023-12-20 NOTE — ASSESSMENT
[FreeTextEntry1] : 52 y/o male with chronic systolic heart failure, NICM (LVEF < 20%, LVIDd 7.36cm), HTN, HLD, and recent CVA (10/1/23), who presents today for follow-up of his HF.  # Chronic systolic HF ACC/AHA stage C, NYHA class II Etiology: NICM Clinically euvolemic, warm extremities GDMT: Increase entresto to 97/103mg BID c/w coreg 6.25mg BID and Farxiga 10 mg daily. If K and renal function are stable on repeat labs, will add MRA. Diuretics: With increased dose of entresto will go to PRN torsemide use Device: Will get repeat TTE in Feb and then refer to EP HF education provided including lifestyle changes (low Na diet, fluid restriction, increase physical activity), current clinical condition, natural progression and prognosis. - Will refer to cardiac rehab once insurance is sorted out - Will plan for a CPET once medications are optimized  # Hypertension GDMT as above.   # CVA Continue DAPT, statin Has an appointment with neuro on 12/13.

## 2023-12-20 NOTE — REASON FOR VISIT
[Cardiac Failure] : cardiac failure [Family Member] : family member [FreeTextEntry1] : Cardiologist: Dr. Borges HF: Dr. Dick EP: Dr. Hernandez

## 2023-12-20 NOTE — PHYSICAL EXAM
[Well Developed] : well developed [No Acute Distress] : no acute distress [Normal Venous Pressure] : normal venous pressure [Normal S1, S2] : normal S1, S2 [No Murmur] : no murmur [No Rub] : no rub [No Gallop] : no gallop [Clear Lung Fields] : clear lung fields [No Respiratory Distress] : no respiratory distress  [Soft] : abdomen soft [Non Tender] : non-tender [No Edema] : no edema [No Cyanosis] : no cyanosis [Moves all extremities] : moves all extremities [Alert and Oriented] : alert and oriented [de-identified] : warm, dry

## 2023-12-20 NOTE — CARDIOLOGY SUMMARY
[de-identified] : 9/27/23: ST, 109 bpm, LAE, NSST abnormality, unchanged from prior on 6/21/22. [de-identified] : JONNA 10/4/23: LVEF <20%, mild RVE with mod RV dysfunction, mod MR/TR, mild TX.  TTE 10/3/23: LVEF <20%, LVIDd 7.36cm, mod RVE with mild RV dysfunction, severe JORGE ALBERTO, mod MR, mild-mod TR, est PASP 35.5 mmHg.   TTE 6/16/22: LVEF <20%, LVIDd 6.85cm, moderate RVE with moderately reduced RV systolic function, severe biatrial enlargement, moderate to sever MR, moderate TR, PASP 38.2mmHg (RAP 8).

## 2023-12-20 NOTE — HISTORY OF PRESENT ILLNESS
[FreeTextEntry1] : 52 y/o male with chronic systolic heart failure, NICM (LVEF < 20%, LVIDd 7.36cm), HTN, HLD, and recent CVA (10/1/23), who presents today for follow-up of his HF.  He presented to [facility] 6/15/22 with c/o weight gain, significant LE edema and testicular swelling. He refers he was feeling well and without limitations up until about 1.5 to 2 months ago when he began experiencing acid reflux symptoms/cough. He initially presented to urgent care and was prescribed oral steroids, AB and an inhaler. Within 24hrs of taking steroids he had significant worsening of symptoms including edema which prompted him to go to [facility]. At that time, he was found to have low EF on echo. LHC revealed NO obstructive CAD. He tells me he signed out AMA due to feeling very confused and overwhelmed. He ultimately returned back with recurrent HF symptoms.   9/27/23: Since his hospitalization in June 2022 he has not returned for follow-up or has seen any doctors. He reports feeling overwhelmed from this hospitalization and "has been busy with his kids and work." He reports taking his medications up until about 8 days ago when he ran out of all of his medications. Since being off of his medications he reports having more "indigestion." He is able to walk an unlimited distance on flat ground without any SOB or fatigue. He denies any CP, palpitations, syncope, LH/dizziness, orthopnea, PND, cough, abdominal discomfort, or LE edema. His appetite is normal. He has not been admitted to the hospital or seen in the ER for HF in the interim.   10/25/23: The patient presents today for follow-up after recent hospitalization for acute CVA (see chart note). He was discharged home on 10/4 and reports compliance with his medications. He endorses no limitations in his AT and is able to play basketball with his kids without any issues. BP at home mostly 130s/70s. His weight has been stable and reports robust UOP.   12/20/23: Patient comes in for followup. He states he has not followed up recently due to insurance issues but that it should be resolving. He overall feels well and is active and is able to exercise without issues.  He denies any overt heart failure symptoms, such as orthopnea, PND, bendopnea, LE edema, chest discomfort, dizziness/lightheadedness, palpitations or syncope.

## 2024-01-01 NOTE — DISCHARGE NOTE PROVIDER - NSDCQMPCI_CARD_ALL_CORE
No
Labs nonactionable  CT with mild enterocolitis  Feeling better  DC with supportive care and outpatient follow-up  Discussed indication for patient return to ED.  Patient understood.

## 2024-02-07 ENCOUNTER — APPOINTMENT (OUTPATIENT)
Dept: HEART FAILURE | Facility: CLINIC | Age: 54
End: 2024-02-07

## 2024-02-14 RX ORDER — TORSEMIDE 20 MG/1
20 TABLET ORAL
Refills: 5 | Status: ACTIVE | COMMUNITY
Start: 2022-07-13

## 2024-02-29 ENCOUNTER — APPOINTMENT (OUTPATIENT)
Dept: CARDIOLOGY | Facility: CLINIC | Age: 54
End: 2024-02-29

## 2024-05-15 NOTE — PATIENT PROFILE ADULT - DO YOU LACK THE NECESSARY SUPPORT TO HELP YOU COPE WITH LIFE CHALLENGES?
airway patent/breath sounds equal/good air movement/respirations non-labored/clear to auscultation bilaterally/no chest wall tenderness
no

## 2024-12-17 NOTE — ED PROVIDER NOTE - NS ED MD DISPO DIVISION
staff is aware and are watching for the new form to come through fax. Will update patient as soon as we receive it.    [facility]

## 2025-01-13 ENCOUNTER — RX RENEWAL (OUTPATIENT)
Age: 55
End: 2025-01-13

## 2025-02-18 ENCOUNTER — APPOINTMENT (OUTPATIENT)
Dept: CARDIOLOGY | Facility: CLINIC | Age: 55
End: 2025-02-18

## 2025-03-18 ENCOUNTER — APPOINTMENT (OUTPATIENT)
Dept: CARDIOLOGY | Facility: CLINIC | Age: 55
End: 2025-03-18

## 2025-03-18 ENCOUNTER — TRANSCRIPTION ENCOUNTER (OUTPATIENT)
Age: 55
End: 2025-03-18

## 2025-03-18 VITALS
WEIGHT: 216.25 LBS | HEART RATE: 91 BPM | HEIGHT: 70.5 IN | DIASTOLIC BLOOD PRESSURE: 98 MMHG | SYSTOLIC BLOOD PRESSURE: 146 MMHG | OXYGEN SATURATION: 99 % | BODY MASS INDEX: 30.61 KG/M2

## 2025-04-29 ENCOUNTER — APPOINTMENT (OUTPATIENT)
Dept: CARDIOLOGY | Facility: CLINIC | Age: 55
End: 2025-04-29

## 2025-07-06 ENCOUNTER — INPATIENT (INPATIENT)
Facility: HOSPITAL | Age: 55
LOS: 1 days | Discharge: HOME | DRG: 293 | End: 2025-07-08
Attending: STUDENT IN AN ORGANIZED HEALTH CARE EDUCATION/TRAINING PROGRAM | Admitting: STUDENT IN AN ORGANIZED HEALTH CARE EDUCATION/TRAINING PROGRAM
Payer: MEDICAID

## 2025-07-06 VITALS
HEIGHT: 65 IN | TEMPERATURE: 97 F | HEART RATE: 74 BPM | WEIGHT: 190.04 LBS | OXYGEN SATURATION: 100 % | RESPIRATION RATE: 20 BRPM | DIASTOLIC BLOOD PRESSURE: 84 MMHG | SYSTOLIC BLOOD PRESSURE: 148 MMHG

## 2025-07-06 DIAGNOSIS — Z98.890 OTHER SPECIFIED POSTPROCEDURAL STATES: Chronic | ICD-10-CM

## 2025-07-06 DIAGNOSIS — I50.9 HEART FAILURE, UNSPECIFIED: ICD-10-CM

## 2025-07-06 DIAGNOSIS — I42.8 OTHER CARDIOMYOPATHIES: ICD-10-CM

## 2025-07-06 DIAGNOSIS — I63.9 CEREBRAL INFARCTION, UNSPECIFIED: ICD-10-CM

## 2025-07-06 LAB
ALBUMIN SERPL ELPH-MCNC: 3.5 G/DL — SIGNIFICANT CHANGE UP (ref 3.3–5.2)
ALP SERPL-CCNC: 59 U/L — SIGNIFICANT CHANGE UP (ref 40–120)
ALT FLD-CCNC: 24 U/L — SIGNIFICANT CHANGE UP
ANION GAP SERPL CALC-SCNC: 12 MMOL/L — SIGNIFICANT CHANGE UP (ref 5–17)
APTT BLD: 29.4 SEC — SIGNIFICANT CHANGE UP (ref 26.1–36.8)
AST SERPL-CCNC: 27 U/L — SIGNIFICANT CHANGE UP
BASOPHILS # BLD AUTO: 0.05 K/UL — SIGNIFICANT CHANGE UP (ref 0–0.2)
BASOPHILS NFR BLD AUTO: 0.9 % — SIGNIFICANT CHANGE UP (ref 0–2)
BILIRUB SERPL-MCNC: 1.2 MG/DL — SIGNIFICANT CHANGE UP (ref 0.4–2)
BUN SERPL-MCNC: 23.3 MG/DL — HIGH (ref 8–20)
CALCIUM SERPL-MCNC: 8.4 MG/DL — SIGNIFICANT CHANGE UP (ref 8.4–10.5)
CHLORIDE SERPL-SCNC: 102 MMOL/L — SIGNIFICANT CHANGE UP (ref 96–108)
CO2 SERPL-SCNC: 23 MMOL/L — SIGNIFICANT CHANGE UP (ref 22–29)
CREAT SERPL-MCNC: 1.31 MG/DL — HIGH (ref 0.5–1.3)
EGFR: 65 ML/MIN/1.73M2 — SIGNIFICANT CHANGE UP
EGFR: 65 ML/MIN/1.73M2 — SIGNIFICANT CHANGE UP
EOSINOPHIL # BLD AUTO: 0.21 K/UL — SIGNIFICANT CHANGE UP (ref 0–0.5)
EOSINOPHIL NFR BLD AUTO: 3.9 % — SIGNIFICANT CHANGE UP (ref 0–6)
FLUAV AG NPH QL: SIGNIFICANT CHANGE UP
FLUBV AG NPH QL: SIGNIFICANT CHANGE UP
GLUCOSE SERPL-MCNC: 89 MG/DL — SIGNIFICANT CHANGE UP (ref 70–99)
HCT VFR BLD CALC: 40 % — SIGNIFICANT CHANGE UP (ref 39–50)
HGB BLD-MCNC: 12.8 G/DL — LOW (ref 13–17)
IMM GRANULOCYTES # BLD AUTO: 0.01 K/UL — SIGNIFICANT CHANGE UP (ref 0–0.07)
IMM GRANULOCYTES NFR BLD AUTO: 0.2 % — SIGNIFICANT CHANGE UP (ref 0–0.9)
INR BLD: 1.36 RATIO — HIGH (ref 0.85–1.16)
LIDOCAIN IGE QN: 42 U/L — SIGNIFICANT CHANGE UP (ref 22–51)
LYMPHOCYTES # BLD AUTO: 1.37 K/UL — SIGNIFICANT CHANGE UP (ref 1–3.3)
LYMPHOCYTES NFR BLD AUTO: 25.7 % — SIGNIFICANT CHANGE UP (ref 13–44)
MCHC RBC-ENTMCNC: 24.5 PG — LOW (ref 27–34)
MCHC RBC-ENTMCNC: 32 G/DL — SIGNIFICANT CHANGE UP (ref 32–36)
MCV RBC AUTO: 76.6 FL — LOW (ref 80–100)
MONOCYTES # BLD AUTO: 0.54 K/UL — SIGNIFICANT CHANGE UP (ref 0–0.9)
MONOCYTES NFR BLD AUTO: 10.1 % — SIGNIFICANT CHANGE UP (ref 2–14)
NEUTROPHILS # BLD AUTO: 3.15 K/UL — SIGNIFICANT CHANGE UP (ref 1.8–7.4)
NEUTROPHILS NFR BLD AUTO: 59.2 % — SIGNIFICANT CHANGE UP (ref 43–77)
NRBC # BLD AUTO: 0 K/UL — SIGNIFICANT CHANGE UP (ref 0–0)
NRBC # FLD: 0 K/UL — SIGNIFICANT CHANGE UP (ref 0–0)
NRBC BLD AUTO-RTO: 0 /100 WBCS — SIGNIFICANT CHANGE UP (ref 0–0)
NT-PROBNP SERPL-SCNC: 5734 PG/ML — HIGH (ref 0–300)
PLATELET # BLD AUTO: 196 K/UL — SIGNIFICANT CHANGE UP (ref 150–400)
PMV BLD: 10 FL — SIGNIFICANT CHANGE UP (ref 7–13)
POTASSIUM SERPL-MCNC: 4 MMOL/L — SIGNIFICANT CHANGE UP (ref 3.5–5.3)
POTASSIUM SERPL-SCNC: 4 MMOL/L — SIGNIFICANT CHANGE UP (ref 3.5–5.3)
PROT SERPL-MCNC: 6.4 G/DL — LOW (ref 6.6–8.7)
PROTHROM AB SERPL-ACNC: 15.3 SEC — HIGH (ref 9.9–13.4)
RAPID RVP RESULT: SIGNIFICANT CHANGE UP
RBC # BLD: 5.22 M/UL — SIGNIFICANT CHANGE UP (ref 4.2–5.8)
RBC # FLD: 14.9 % — HIGH (ref 10.3–14.5)
RSV RNA NPH QL NAA+NON-PROBE: SIGNIFICANT CHANGE UP
SARS-COV-2 RNA SPEC QL NAA+PROBE: SIGNIFICANT CHANGE UP
SARS-COV-2 RNA SPEC QL NAA+PROBE: SIGNIFICANT CHANGE UP
SODIUM SERPL-SCNC: 137 MMOL/L — SIGNIFICANT CHANGE UP (ref 135–145)
SOURCE RESPIRATORY: SIGNIFICANT CHANGE UP
TROPONIN T, HIGH SENSITIVITY RESULT: 18 NG/L — SIGNIFICANT CHANGE UP (ref 0–51)
TROPONIN T, HIGH SENSITIVITY RESULT: 18 NG/L — SIGNIFICANT CHANGE UP (ref 0–51)
WBC # BLD: 5.33 K/UL — SIGNIFICANT CHANGE UP (ref 3.8–10.5)
WBC # FLD AUTO: 5.33 K/UL — SIGNIFICANT CHANGE UP (ref 3.8–10.5)

## 2025-07-06 PROCEDURE — 84484 ASSAY OF TROPONIN QUANT: CPT

## 2025-07-06 PROCEDURE — 85730 THROMBOPLASTIN TIME PARTIAL: CPT

## 2025-07-06 PROCEDURE — 94640 AIRWAY INHALATION TREATMENT: CPT

## 2025-07-06 PROCEDURE — 80053 COMPREHEN METABOLIC PANEL: CPT

## 2025-07-06 PROCEDURE — 83690 ASSAY OF LIPASE: CPT

## 2025-07-06 PROCEDURE — 0241U: CPT

## 2025-07-06 PROCEDURE — 85610 PROTHROMBIN TIME: CPT

## 2025-07-06 PROCEDURE — 83880 ASSAY OF NATRIURETIC PEPTIDE: CPT

## 2025-07-06 PROCEDURE — 99285 EMERGENCY DEPT VISIT HI MDM: CPT

## 2025-07-06 PROCEDURE — 71045 X-RAY EXAM CHEST 1 VIEW: CPT | Mod: 26

## 2025-07-06 PROCEDURE — 93005 ELECTROCARDIOGRAM TRACING: CPT

## 2025-07-06 PROCEDURE — 0225U NFCT DS DNA&RNA 21 SARSCOV2: CPT

## 2025-07-06 PROCEDURE — 99223 1ST HOSP IP/OBS HIGH 75: CPT

## 2025-07-06 PROCEDURE — 99254 IP/OBS CNSLTJ NEW/EST MOD 60: CPT

## 2025-07-06 PROCEDURE — 85025 COMPLETE CBC W/AUTO DIFF WBC: CPT

## 2025-07-06 PROCEDURE — 71045 X-RAY EXAM CHEST 1 VIEW: CPT

## 2025-07-06 PROCEDURE — 36415 COLL VENOUS BLD VENIPUNCTURE: CPT

## 2025-07-06 RX ORDER — ONDANSETRON HCL/PF 4 MG/2 ML
4 VIAL (ML) INJECTION EVERY 8 HOURS
Refills: 0 | Status: DISCONTINUED | OUTPATIENT
Start: 2025-07-06 | End: 2025-07-08

## 2025-07-06 RX ORDER — MELATONIN 5 MG
3 TABLET ORAL AT BEDTIME
Refills: 0 | Status: DISCONTINUED | OUTPATIENT
Start: 2025-07-06 | End: 2025-07-08

## 2025-07-06 RX ORDER — ACETAMINOPHEN 500 MG/5ML
650 LIQUID (ML) ORAL EVERY 6 HOURS
Refills: 0 | Status: DISCONTINUED | OUTPATIENT
Start: 2025-07-06 | End: 2025-07-08

## 2025-07-06 RX ORDER — MAGNESIUM, ALUMINUM HYDROXIDE 200-200 MG
30 TABLET,CHEWABLE ORAL EVERY 4 HOURS
Refills: 0 | Status: DISCONTINUED | OUTPATIENT
Start: 2025-07-06 | End: 2025-07-08

## 2025-07-06 RX ORDER — HEPARIN SODIUM 1000 [USP'U]/ML
5000 INJECTION INTRAVENOUS; SUBCUTANEOUS EVERY 8 HOURS
Refills: 0 | Status: DISCONTINUED | OUTPATIENT
Start: 2025-07-06 | End: 2025-07-08

## 2025-07-06 RX ORDER — IPRATROPIUM BROMIDE AND ALBUTEROL SULFATE .5; 2.5 MG/3ML; MG/3ML
3 SOLUTION RESPIRATORY (INHALATION) EVERY 6 HOURS
Refills: 0 | Status: DISCONTINUED | OUTPATIENT
Start: 2025-07-06 | End: 2025-07-07

## 2025-07-06 RX ORDER — IPRATROPIUM BROMIDE AND ALBUTEROL SULFATE .5; 2.5 MG/3ML; MG/3ML
3 SOLUTION RESPIRATORY (INHALATION) ONCE
Refills: 0 | Status: COMPLETED | OUTPATIENT
Start: 2025-07-06 | End: 2025-07-06

## 2025-07-06 RX ORDER — CARVEDILOL 3.12 MG/1
6.25 TABLET, FILM COATED ORAL EVERY 12 HOURS
Refills: 0 | Status: DISCONTINUED | OUTPATIENT
Start: 2025-07-06 | End: 2025-07-07

## 2025-07-06 RX ORDER — FUROSEMIDE 10 MG/ML
40 INJECTION INTRAMUSCULAR; INTRAVENOUS
Refills: 0 | Status: DISCONTINUED | OUTPATIENT
Start: 2025-07-06 | End: 2025-07-07

## 2025-07-06 RX ORDER — ASPIRIN 325 MG
81 TABLET ORAL DAILY
Refills: 0 | Status: DISCONTINUED | OUTPATIENT
Start: 2025-07-06 | End: 2025-07-08

## 2025-07-06 RX ADMIN — FUROSEMIDE 40 MILLIGRAM(S): 10 INJECTION INTRAMUSCULAR; INTRAVENOUS at 15:31

## 2025-07-06 RX ADMIN — CARVEDILOL 6.25 MILLIGRAM(S): 3.12 TABLET, FILM COATED ORAL at 17:43

## 2025-07-06 RX ADMIN — HEPARIN SODIUM 5000 UNIT(S): 1000 INJECTION INTRAVENOUS; SUBCUTANEOUS at 21:06

## 2025-07-06 RX ADMIN — IPRATROPIUM BROMIDE AND ALBUTEROL SULFATE 3 MILLILITER(S): .5; 2.5 SOLUTION RESPIRATORY (INHALATION) at 13:52

## 2025-07-06 RX ADMIN — Medication 160 MILLIGRAM(S): at 15:32

## 2025-07-06 NOTE — H&P ADULT - HISTORY OF PRESENT ILLNESS
56 yo M PMHx HFrEF, NICM (LVEF < 20%, LVIDd 7.36cm), HTN, HLD, and recent CVA (10/1/23), who presents to the ED with complaints of cough. Reports that he has been in his usual state of health until a little over a week ago, when he started having productive cough and was treated with Augmentin for presumed bronchitis. Since then the sputum has cleared up, but continues to have SOB and noted increased LE edema x 1 day. Denies any LO, SOB, nor sick conctact. ROS negative for fever, chill, n/v/c/d nor urinary concenrs.   ED vitals stbale, labs with SCr of 1.31, and BNP 5734, admitted for further care.

## 2025-07-06 NOTE — ED PROVIDER NOTE - PHYSICAL EXAMINATION
General: NAD, well appearing  HEENT: Normocephalic, atraumatic  Neck: No apparent stiffness or JVD  Pulm: Chest wall symmetric and nontender, lungs clear to ascultation   Cardiac: Regular rate and regular rhythm  Abdomen: Nontender and nondistended  Skin: Skin is warm, dry and intact without rashes or lesions.  Neuro: No motor or sensory deficits above reported baseline  MSK: No deformity or tenderness above reported baseline, mild edema BLE

## 2025-07-06 NOTE — ED ADULT NURSE NOTE - NSFALLUNIVINTERV_ED_ALL_ED
Bed/Stretcher in lowest position, wheels locked, appropriate side rails in place/Call bell, personal items and telephone in reach/Instruct patient to call for assistance before getting out of bed/chair/stretcher/Non-slip footwear applied when patient is off stretcher/[place] to call system/Physically safe environment - no spills, clutter or unnecessary equipment/Purposeful proactive rounding/Room/bathroom lighting operational, light cord in reach

## 2025-07-06 NOTE — ED ADULT NURSE REASSESSMENT NOTE - NS ED NURSE REASSESS COMMENT FT1
Report given to Vicki HERNDON. Pt moved to ztent 11 on tele box. Pt does not want to get in gown, pt is cold and wearing multiple layers. RN tabby informed.

## 2025-07-06 NOTE — H&P ADULT - ASSESSMENT
54 yo M PMHx HFrEF, NICM (LVEF < 20%, LVIDd 7.36cm), HTN, HLD, and recent CVA (10/1/23), who presents to the ED with complaints of cough, admitted for further workup.     #Acute on chronic HFrEF  #h/o NICM  BNP up to 5734, negative trop  admit to tele  fu CXR read  start lasix per cards  fu repeat TTE  cw home coreg, valsartan and ASA  cards following, jesse recs     #Cough, r/o bronchitis  CXR without acute findings  fu RVP   start Duoneb x 2 days -- reports improvement after tx in the ED  monitor off abx, pt does not appear toxic    #Elevated SCr  chronically elevated SCr  1.3-1.4  permissive azotemia while on diuresis  Serial BMP    DVT ppx: HSQ  Diet: DASH/TLC  Dispo: pending cardiac workup  54 yo M PMHx HFrEF, NICM (LVEF < 20%, LVIDd 7.36cm), HTN, HLD, and recent CVA (10/1/23), who presents to the ED with complaints of cough, admitted for further workup.     #Acute on chronic HFrEF  #h/o NICM  #HTN  #HLD -- pt says no longer on statin  BNP up to 5734, negative trop  admit to tele  fu CXR read  start lasix per cards  fu repeat TTE  cw home coreg, valsartan and ASA  cards following, jesse recs     #Cough, r/o bronchitis  CXR without acute findings  fu RVP   start Duoneb x 2 days -- reports improvement after tx in the ED  monitor off abx, pt does not appear toxic    #Elevated SCr  chronically elevated SCr  1.3-1.4  permissive azotemia while on diuresis  Serial BMP    DVT ppx: HSQ  Diet: DASH/TLC  Dispo: pending cardiac workup

## 2025-07-06 NOTE — PROVIDER CONTACT NOTE (CHANGE IN STATUS NOTIFICATION) - SITUATION
Pt received from KATRINA Rader from the ED, vitals were taken and pt has an elevated BP, pt has a BP of 142/101.

## 2025-07-06 NOTE — ED PROVIDER NOTE - CLINICAL SUMMARY MEDICAL DECISION MAKING FREE TEXT BOX
Pt is a 54 yo male with pmh of HTN, CHF, gerd presenting with cough. Pt had a cough for 8 days with brownish sputum in the past few days. Pt went to  when the coughing started and was told he had bronchitis and prescribed augmentin. Pt has been compliant on antibiotics with no improvement. pt went to [facility] outpt a month and half ago and was switched from entresto to carvedilol due to improvement in heart function. pt has been using albuterol pump with mod relief. Pt denies chest pain, SOB, abdominal pain, N/V/D, fever.     vs stable, exam unremarkable, except for edema ble. lungs clear.    acs workup given cardiac hx. more likely uri vs bronchitis vs possible pna given sputum and cough.    duoneb for symptom relief. Pt is a 54 yo male with pmh of HTN, CHF, gerd presenting with cough. Pt had a cough for 8 days with brownish sputum in the past few days. Pt went to  when the coughing started and was told he had bronchitis and prescribed augmentin. Pt has been compliant on antibiotics with no improvement. pt went to [facility] outpt a month and half ago and was switched from entresto to carvedilol due to improvement in heart function. pt has been using albuterol pump with mod relief. Pt denies chest pain, SOB, abdominal pain, N/V/D, fever.     vs stable, exam unremarkable, except for edema ble. lungs clear.    acs workup given cardiac hx. more likely uri vs bronchitis vs possible pna given sputum and cough. consulted cards, recommending iv diuresis, repeat echo and admission for chf exacerbation.    duoneb for symptom relief.

## 2025-07-06 NOTE — CONSULT NOTE ADULT - PROBLEM SELECTOR RECOMMENDATION 2
- hx of CVA in 2023 with no residuals  - continue with ASA 81 mg PO daily and atorvastatin 40 mg PO daily

## 2025-07-06 NOTE — ED PROVIDER NOTE - OBJECTIVE STATEMENT
Pt is a 56 yo male with pmh of HTN, CHF, gerd presenting with cough. Pt had a cough for 8 days with brownish sputum in the past few days. Pt went to  when the coughing started and was told he had bronchitis and prescribed augmentin. Pt has been compliant on antibiotics with no improvement. pt went to [facility] outpt a month and half ago and was switched from entresto to carvedilol due to improvement in heart function. pt has been using albuterol pump with mod relief. Pt denies chest pain, SOB, abdominal pain, N/V/D, fever.

## 2025-07-06 NOTE — H&P ADULT - NSHPREVIEWOFSYSTEMS_GEN_ALL_CORE
[facility] Update Call    2022    Patient: Shayan Hedrick Patient : 1932   MRN: [id number]  Reason for Admission: traumatic pneumothorax  Discharge Date: 22 RARS: Readmission Risk Score: 17.7 ( )     CTN spoke with d/c planner in regards to tentative d/c dates from short term rehab. CTN was informed that patient is currently still in short term rehab and may be transferred to another facility.  CTN to follow up again at a later time    [address] [place] [address] Update    Care Transitions Interventions  Post Acute Facility Update REVIEW OF SYSTEMS:    CONSTITUTIONAL: No weakness, fevers or chills. +LE edema  EYES: No vertigo or throat pain  ENT: No visual changes, eye pain  MOUTH: moist jorge luis mucosal, no mouth ulcers  NECK: No pain or stiffness  RESPIRATORY: + cough, shortness of breath and sputum production   CARDIOVASCULAR: No chest pain or palpitations  GASTROINTESTINAL: No abdominal or epigastric pain. No nausea, vomiting, or hematemesis; No diarrhea or constipation. No melena or hematochezia.  GENITOURINARY: No dysuria, frequency or hematuria  NEUROLOGICAL: No numbness or weakness  SKIN: No itching, rashes  PSYCH: No anxiety or depression

## 2025-07-06 NOTE — H&P ADULT - NSHPPHYSICALEXAM_GEN_ALL_CORE
VITALS:   T(C): 36.3 (07-06-25 @ 11:24), Max: 36.3 (07-06-25 @ 11:24)  HR: 88 (07-06-25 @ 15:28) (74 - 88)  BP: 159/137 (07-06-25 @ 15:28) (148/84 - 159/137)  RR: 17 (07-06-25 @ 15:28) (17 - 20)  SpO2: 94% (07-06-25 @ 15:28) (94% - 100%)    GENERAL: NAD, lying in bed comfortably  HEAD:  Atraumatic, Normocephalic  CHEST/LUNG: Clear to auscultation bilaterally; No wheezes noted  HEART: Regular rate and rhythm; 2+ LE edema   ABDOMEN: BSx4; Soft, nontender, nondistended

## 2025-07-06 NOTE — ED PROVIDER NOTE - ATTENDING CONTRIBUTION TO CARE
55yoM; with HTN, HFrEF, CKD IIIb, GERD, HLD; now p/w cough x2 weeks--began with brown sputum, with chest pressure, with wheezing with ambulation. states he went to urgent care and was placed on amoxicillin. states the brown sputum cleared to white/clear sputum. states now he main concern is that he is wheezing with exertion. patient also mentioned he was switched from Entresto to Carvedilol 1 month ago because he has been doing well.  denies f/c/s. denies nausea, diaphoresis, numbness/tingling. denies recent trauma, travel, smoking  General:     NAD  Head:     NC/AT, EOMI, oral mucosa moist  Neck:     trachea midline  Lungs:     CTA b/l, no w/r/r  CVS:     S1S2, RRR, no m/g/r  Ext:    2+ radial and pedal pulses, trace bilateral pedal edema.   A/P: 55yoM p/w cough with brown sputum, with exertional wheezing over past 2 weeks in setting of medication change.   -labs, xray, nebs, cardiac monitor, re-eval

## 2025-07-06 NOTE — PATIENT PROFILE ADULT - FALL HARM RISK - UNIVERSAL INTERVENTIONS
Bed in lowest position, wheels locked, appropriate side rails in place/Call bell, personal items and telephone in reach/Instruct patient to call for assistance before getting out of bed or chair/Non-slip footwear when patient is out of bed/[place] to call system/Physically safe environment - no spills, clutter or unnecessary equipment/Purposeful Proactive Rounding/Room/bathroom lighting operational, light cord in reach

## 2025-07-06 NOTE — CONSULT NOTE ADULT - ASSESSMENT
54 y/o M with hx of chronic systolic heart failure, NICM (LVEF < 20%, LVIDd 7.36cm), HTN, HLD, and recent CVA (10/1/23), who presents to the ED with complaints of cough. He has been having a cough for the past eight days with brownish sputum. His sputum was clearing up as the days progressed. Patient went to urgent care when the cough started and was told he had bronchitis, then prescribed augmentin. Patient has been complaint with his abx but had no improvement in cough. Pt has been using albuterol inhaler with mild relief. Cardiology consulted for CHF.

## 2025-07-06 NOTE — H&P ADULT - NSHPLABSRESULTS_GEN_ALL_CORE
12.8   5.33  )-----------( 196      ( 06 Jul 2025 13:21 )             40.0       07-06    137  |  102  |  23.3[H]  ----------------------------<  89  4.0   |  23.0  |  1.31[H]    Ca    8.4      06 Jul 2025 13:21    TPro  6.4[L]  /  Alb  3.5  /  TBili  1.2  /  DBili  x   /  AST  27  /  ALT  24  /  AlkPhos  59  07-06              Urinalysis Basic - ( 06 Jul 2025 13:21 )    Color: x / Appearance: x / SG: x / pH: x  Gluc: 89 mg/dL / Ketone: x  / Bili: x / Urobili: x   Blood: x / Protein: x / Nitrite: x   Leuk Esterase: x / RBC: x / WBC x   Sq Epi: x / Non Sq Epi: x / Bacteria: x        PT/INR - ( 06 Jul 2025 13:21 )   PT: 15.3 sec;   INR: 1.36 ratio         PTT - ( 06 Jul 2025 13:21 )  PTT:29.4 sec    Lactate Trend            CAPILLARY BLOOD GLUCOSE

## 2025-07-06 NOTE — ED ADULT NURSE NOTE - OBJECTIVE STATEMENT
Assumed care 1355 pt c/o cough and wheezing for a couple of days, states he was diagnosed with bronchitis was on antibiotics. pt denies any SOB, chest pain.

## 2025-07-06 NOTE — ED ADULT TRIAGE NOTE - CHIEF COMPLAINT QUOTE
c/o dry cough for the last 8 days worsening 2 days ago. pt seen at urgent care, dx with bronchitis, prescribed amoxicillin without relief.

## 2025-07-07 ENCOUNTER — RESULT REVIEW (OUTPATIENT)
Age: 55
End: 2025-07-07

## 2025-07-07 DIAGNOSIS — I50.23 ACUTE ON CHRONIC SYSTOLIC (CONGESTIVE) HEART FAILURE: ICD-10-CM

## 2025-07-07 LAB
ANION GAP SERPL CALC-SCNC: 13 MMOL/L — SIGNIFICANT CHANGE UP (ref 5–17)
BUN SERPL-MCNC: 28.1 MG/DL — HIGH (ref 8–20)
CALCIUM SERPL-MCNC: 8.4 MG/DL — SIGNIFICANT CHANGE UP (ref 8.4–10.5)
CHLORIDE SERPL-SCNC: 101 MMOL/L — SIGNIFICANT CHANGE UP (ref 96–108)
CO2 SERPL-SCNC: 25 MMOL/L — SIGNIFICANT CHANGE UP (ref 22–29)
CREAT SERPL-MCNC: 1.6 MG/DL — HIGH (ref 0.5–1.3)
EGFR: 51 ML/MIN/1.73M2 — LOW
EGFR: 51 ML/MIN/1.73M2 — LOW
GLUCOSE SERPL-MCNC: 98 MG/DL — SIGNIFICANT CHANGE UP (ref 70–99)
HCT VFR BLD CALC: 38.9 % — LOW (ref 39–50)
HGB BLD-MCNC: 12.8 G/DL — LOW (ref 13–17)
MAGNESIUM SERPL-MCNC: 1.8 MG/DL — SIGNIFICANT CHANGE UP (ref 1.6–2.6)
MCHC RBC-ENTMCNC: 24.5 PG — LOW (ref 27–34)
MCHC RBC-ENTMCNC: 32.9 G/DL — SIGNIFICANT CHANGE UP (ref 32–36)
MCV RBC AUTO: 74.5 FL — LOW (ref 80–100)
NRBC # BLD AUTO: 0 K/UL — SIGNIFICANT CHANGE UP (ref 0–0)
NRBC # FLD: 0 K/UL — SIGNIFICANT CHANGE UP (ref 0–0)
NRBC BLD AUTO-RTO: 0 /100 WBCS — SIGNIFICANT CHANGE UP (ref 0–0)
PHOSPHATE SERPL-MCNC: 4.3 MG/DL — SIGNIFICANT CHANGE UP (ref 2.4–4.7)
PLATELET # BLD AUTO: 215 K/UL — SIGNIFICANT CHANGE UP (ref 150–400)
PMV BLD: 10.8 FL — SIGNIFICANT CHANGE UP (ref 7–13)
POTASSIUM SERPL-MCNC: 3.4 MMOL/L — LOW (ref 3.5–5.3)
POTASSIUM SERPL-SCNC: 3.4 MMOL/L — LOW (ref 3.5–5.3)
RBC # BLD: 5.22 M/UL — SIGNIFICANT CHANGE UP (ref 4.2–5.8)
RBC # FLD: 14.7 % — HIGH (ref 10.3–14.5)
SODIUM SERPL-SCNC: 139 MMOL/L — SIGNIFICANT CHANGE UP (ref 135–145)
WBC # BLD: 6.09 K/UL — SIGNIFICANT CHANGE UP (ref 3.8–10.5)
WBC # FLD AUTO: 6.09 K/UL — SIGNIFICANT CHANGE UP (ref 3.8–10.5)

## 2025-07-07 PROCEDURE — 83735 ASSAY OF MAGNESIUM: CPT

## 2025-07-07 PROCEDURE — 0225U NFCT DS DNA&RNA 21 SARSCOV2: CPT

## 2025-07-07 PROCEDURE — 0241U: CPT

## 2025-07-07 PROCEDURE — 85027 COMPLETE CBC AUTOMATED: CPT

## 2025-07-07 PROCEDURE — 83880 ASSAY OF NATRIURETIC PEPTIDE: CPT

## 2025-07-07 PROCEDURE — 83690 ASSAY OF LIPASE: CPT

## 2025-07-07 PROCEDURE — 94640 AIRWAY INHALATION TREATMENT: CPT

## 2025-07-07 PROCEDURE — 36415 COLL VENOUS BLD VENIPUNCTURE: CPT

## 2025-07-07 PROCEDURE — 85730 THROMBOPLASTIN TIME PARTIAL: CPT

## 2025-07-07 PROCEDURE — 71045 X-RAY EXAM CHEST 1 VIEW: CPT

## 2025-07-07 PROCEDURE — 93306 TTE W/DOPPLER COMPLETE: CPT | Mod: 26

## 2025-07-07 PROCEDURE — 84484 ASSAY OF TROPONIN QUANT: CPT

## 2025-07-07 PROCEDURE — 99223 1ST HOSP IP/OBS HIGH 75: CPT

## 2025-07-07 PROCEDURE — 80048 BASIC METABOLIC PNL TOTAL CA: CPT

## 2025-07-07 PROCEDURE — 85610 PROTHROMBIN TIME: CPT

## 2025-07-07 PROCEDURE — 85025 COMPLETE CBC W/AUTO DIFF WBC: CPT

## 2025-07-07 PROCEDURE — 99233 SBSQ HOSP IP/OBS HIGH 50: CPT

## 2025-07-07 PROCEDURE — 84100 ASSAY OF PHOSPHORUS: CPT

## 2025-07-07 PROCEDURE — 80053 COMPREHEN METABOLIC PANEL: CPT

## 2025-07-07 PROCEDURE — 93005 ELECTROCARDIOGRAM TRACING: CPT

## 2025-07-07 RX ORDER — SPIRONOLACTONE 25 MG
25 TABLET ORAL DAILY
Refills: 0 | Status: DISCONTINUED | OUTPATIENT
Start: 2025-07-07 | End: 2025-07-08

## 2025-07-07 RX ORDER — ATORVASTATIN CALCIUM 80 MG/1
40 TABLET, FILM COATED ORAL AT BEDTIME
Refills: 0 | Status: DISCONTINUED | OUTPATIENT
Start: 2025-07-07 | End: 2025-07-07

## 2025-07-07 RX ORDER — MAGNESIUM SULFATE 500 MG/ML
2 SYRINGE (ML) INJECTION ONCE
Refills: 0 | Status: COMPLETED | OUTPATIENT
Start: 2025-07-07 | End: 2025-07-07

## 2025-07-07 RX ORDER — CARVEDILOL 3.12 MG/1
6.25 TABLET, FILM COATED ORAL EVERY 12 HOURS
Refills: 0 | Status: DISCONTINUED | OUTPATIENT
Start: 2025-07-07 | End: 2025-07-08

## 2025-07-07 RX ORDER — CARVEDILOL 3.12 MG/1
12.5 TABLET, FILM COATED ORAL EVERY 12 HOURS
Refills: 0 | Status: DISCONTINUED | OUTPATIENT
Start: 2025-07-07 | End: 2025-07-07

## 2025-07-07 RX ORDER — FUROSEMIDE 10 MG/ML
40 INJECTION INTRAMUSCULAR; INTRAVENOUS EVERY 12 HOURS
Refills: 0 | Status: DISCONTINUED | OUTPATIENT
Start: 2025-07-07 | End: 2025-07-07

## 2025-07-07 RX ADMIN — Medication 650 MILLIGRAM(S): at 06:33

## 2025-07-07 RX ADMIN — Medication 80 MILLIGRAM(S): at 17:03

## 2025-07-07 RX ADMIN — IPRATROPIUM BROMIDE AND ALBUTEROL SULFATE 3 MILLILITER(S): .5; 2.5 SOLUTION RESPIRATORY (INHALATION) at 02:41

## 2025-07-07 RX ADMIN — Medication 650 MILLIGRAM(S): at 22:40

## 2025-07-07 RX ADMIN — FUROSEMIDE 40 MILLIGRAM(S): 10 INJECTION INTRAMUSCULAR; INTRAVENOUS at 05:33

## 2025-07-07 RX ADMIN — Medication 160 MILLIGRAM(S): at 05:34

## 2025-07-07 RX ADMIN — CARVEDILOL 6.25 MILLIGRAM(S): 3.12 TABLET, FILM COATED ORAL at 17:03

## 2025-07-07 RX ADMIN — CARVEDILOL 6.25 MILLIGRAM(S): 3.12 TABLET, FILM COATED ORAL at 05:32

## 2025-07-07 RX ADMIN — Medication 650 MILLIGRAM(S): at 21:40

## 2025-07-07 RX ADMIN — Medication 650 MILLIGRAM(S): at 05:33

## 2025-07-07 RX ADMIN — Medication 3 MILLIGRAM(S): at 21:37

## 2025-07-07 RX ADMIN — HEPARIN SODIUM 5000 UNIT(S): 1000 INJECTION INTRAVENOUS; SUBCUTANEOUS at 21:36

## 2025-07-07 RX ADMIN — Medication 25 MILLIGRAM(S): at 13:40

## 2025-07-07 RX ADMIN — Medication 25 GRAM(S): at 08:13

## 2025-07-07 RX ADMIN — Medication 40 MILLIEQUIVALENT(S): at 08:12

## 2025-07-07 RX ADMIN — Medication 81 MILLIGRAM(S): at 08:11

## 2025-07-07 RX ADMIN — HEPARIN SODIUM 5000 UNIT(S): 1000 INJECTION INTRAVENOUS; SUBCUTANEOUS at 05:33

## 2025-07-07 RX ADMIN — HEPARIN SODIUM 5000 UNIT(S): 1000 INJECTION INTRAVENOUS; SUBCUTANEOUS at 13:41
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
## 2025-07-07 NOTE — PROGRESS NOTE ADULT - ASSESSMENT
54 yo M PMHx HFrEF, NICM (LVEF < 20%, LVIDd 7.36cm), HTN, HLD, and recent CVA (10/1/23), who presents to the ED with complaints of cough, admitted for further workup.     #Acute on chronic HFrEF  #h/o NICM  #HTN  #HLD -- pt says no longer on statin  BNP up to 5734, negative trop  admit to tele  fu CXR read  start lasix per cards  fu repeat TTE  cw home coreg, valsartan and ASA  cards following, jesse recs     #Cough, r/o bronchitis  CXR without acute findings  fu RVP   start Duoneb x 2 days -- reports improvement after tx in the ED  monitor off abx, pt does not appear toxic    #Elevated SCr  chronically elevated SCr  1.3-1.4  permissive azotemia while on diuresis  Serial BMP    DVT ppx: HSQ  Diet: DASH/TLC  Dispo: pending cardiac workup      54 yo M PMHx HFrEF, NICM (LVEF < 20%, LVIDd 7.36cm), HTN, HLD, and recent CVA (10/1/23), who presents to the ED with complaints of cough as well as worsening SOB on exertion and LE edema. BNP up to 5734. CXR with pulm vascular congestion. Concerned for CHF exacerbation. Cardio consulted. Admitted for diuresis and work up.    admitted for further workup.     #Acute on chronic HFrEF  #h/o NICM  #HTN  #HLD  - discussed with HF team  - further diuresis on hold, HF planning to start PO lasix tomorrow  - HF considering RHC prior to d/c  - started on aldactone  - valsartan changed to 160mg in AM and 80mg in PM  - c/w coreg 6.25mg BID  - strict I/Os, daily standing weights  - trend BMP  - keep K>4, mg >2  - EP consulted for ICD evaluation, planning for Wednesday       -- pt says no longer on statin  BNP up to 5734, negative trop  admit to tele  fu CXR read  start lasix per cards  fu repeat TTE  cw home coreg, valsartan and ASA  cards following, jesse recs     #Cough, r/o bronchitis  CXR without acute findings  fu RVP   start Duoneb x 2 days -- reports improvement after tx in the ED  monitor off abx, pt does not appear toxic    #Elevated SCr  chronically elevated SCr  1.3-1.4  permissive azotemia while on diuresis  Serial BMP    DVT ppx: HSQ  Diet: DASH/TLC  Dispo: pending cardiac workup      54 yo M PMHx HFrEF, NICM (LVEF < 20%, LVIDd 7.36cm), HTN, HLD, and recent CVA (10/1/23), who presents to the ED with complaints of cough as well as worsening SOB on exertion and LE edema. BNP up to 5734. CXR with pulm vascular congestion. Concerned for CHF exacerbation. Cardio consulted. Admitted for diuresis and work up.    admitted for further workup.     #Acute on chronic HFrEF  #h/o NICM  #HTN  #HLD  - discussed with HF team  - further diuresis on hold, HF planning to start PO lasix tomorrow  - HF considering RHC prior to d/c  - started on aldactone  - valsartan changed to 160mg in AM and 80mg in PM  - c/w coreg 6.25mg BID  - strict I/Os, daily standing weights  - trend BMP  - keep K>4, mg >2  - EP consulted for ICD evaluation/placement    CKD  chronically elevated SCr  1.3-1.4  permissive azotemia while on diuresis  Serial BMP    DVT ppx: HSQ  Diet: DASH/TLC  Dispo: pending cardiac workup      54 yo M PMHx HFrEF, NICM (LVEF < 20%, LVIDd 7.36cm), HTN, HLD, and recent CVA (10/1/23), who presents to the ED with complaints of cough as well as worsening SOB on exertion and LE edema. BNP up to 5734. CXR with pulm vascular congestion. Concerned for CHF exacerbation. Cardio consulted. Admitted for diuresis and work up.    #Acute on chronic HFrEF  #h/o NICM  #HTN  #HLD  - TTE with EF <20%  - discussed with HF team  - further diuresis on hold, HF planning to start PO lasix tomorrow  - HF considering RHC prior to d/c  - started on aldactone  - valsartan changed to 160mg in AM and 80mg in PM  - c/w coreg 6.25mg BID  - strict I/Os, daily standing weights  - trend BMP  - keep K>4, mg >2  - EP consulted for ICD evaluation/placement    Hypokalemia  - PO supplementation ordered today  - BMP tomorrow     CKD  - Cr 1.3-1.5 in october 2023  - Cr here 1.3-1.6   - suspect slight azotemia from IV diuresis  - further diuresis on hold today  - recheck BMP tomorrow    Prior CVA 2023  - c/w ASA  - pt reports not on statin anymore    DVT ppx: subq heparin    Dispo: medically active

## 2025-07-07 NOTE — CONSULT NOTE ADULT - SUBJECTIVE AND OBJECTIVE BOX
ADVANCED HEART FAILURE - CONSULT NOTE  [address] [place] [address]  Office Phone: [phone]/Fax: [phone]  Service/On Call Phone [phone]  _______________________________________________________________________________________________________    HPI:  56 y/o M with PMH of dilated NICM/HFrEF (LVEF <20%, LVIDd 7.36cm), HTN, HLD, and CVA (10/1/23), who presented to the ED with ADHF.     His last HF hospitalization was ~3 years ago. He reports recently moving to an apartment with his mother which he states there was "paranormal activity." This has caused him a lot of stress over the past 2 months. He reports worsening SOB, cough, and LE edema. He also had a lapse in his health insurance so was unable to obtain Entresto or Farxiga.    In the ED his labs were notable for H/H 12.8/40, BUN/Cr 23/1.31, hsTrop negative x2, LFTs normal, and pro-BNP 5734.     PAST MEDICAL & SURGICAL HISTORY:  CHF, chronic      Hypertension      Hyperlipidemia      S/P hernia repair        REVIEW OF SYSTEMS:  14 point ROS negative in detail apart from as documented in HPI.    MEDICATIONS  (STANDING):  aspirin enteric coated 81 milliGRAM(s) Oral daily  atorvastatin 40 milliGRAM(s) Oral at bedtime  carvedilol 6.25 milliGRAM(s) Oral every 12 hours  heparin   Injectable 5000 Unit(s) SubCutaneous every 8 hours  spironolactone 25 milliGRAM(s) Oral daily  valsartan 80 milliGRAM(s) Oral <User Schedule>    MEDICATIONS  (PRN):  acetaminophen     Tablet .. 650 milliGRAM(s) Oral every 6 hours PRN Temp greater or equal to 38C (100.4F), Mild Pain (1 - 3)  aluminum hydroxide/magnesium hydroxide/simethicone Suspension 30 milliLiter(s) Oral every 4 hours PRN Dyspepsia  melatonin 3 milliGRAM(s) Oral at bedtime PRN Insomnia  ondansetron Injectable 4 milliGRAM(s) IV Push every 8 hours PRN Nausea and/or Vomiting    Home Medications:  aspirin 81 mg oral delayed release tablet: 1 tab(s) orally once a day (2025 16:01)  Coreg 6.25 mg oral tablet: 1 tab(s) orally 2 times a day (2025 16:01)  valsartan 80 mg oral tablet: 1 tab(s) orally once a day (2025 16:01)    Allergies  No Known Allergies      ICU Vital Signs Last 24 Hrs  T(C): 36.4, Max: 36.7 ( @ 05:00)  HR: 77 (77 - 97)  BP: 155/107 (136/96 - 159/117)  BP(mean): --  ABP: --  ABP(mean): --  RR: 18 (15 - 18)  SpO2: 93% (93% - 99%)    Weight in k.9 (25)      I&O's Summary Last 24 Hrs    IN: 480 mL / OUT: 600 mL / NET: -120 mL       Tele: SR 70-90s    Physical Exam:    General: No distress. Comfortable.  Neck: JVP not elevated.  Respiratory: Clear to auscultation bilaterally  CV: RRR. Normal S1 and S2. No murmurs, rub, or gallops. Radial pulses normal.  Abdomen: Soft, non-distended, non-tender  Extremities: Warm, trace BLE edema  Neurology: Non-focal, alert and oriented times three.   Psych: Affect normal    Labs:    ( 25 @ 06:24 )               12.8   6.09  )--------( 215                  38.9     ( 25 @ 06:24 )     139  |  101  |  28.1  ---------------------<  98  3.4  |  25.0  |  1.60    Ca 8.4  Phos 4.3  Mg 1.8    ( 25 @ 13:21 )  TPro  6.4  /  Alb  3.5  /  TBili  1.2  /  DBili  x   /  AST  27  /  ALT  24  /  AlkPhos  59    PTT/PT/INR - ( 25 @ 13:21 )  PTT: 29.4 sec / PT: 15.3 sec / INR: 1.36 ratio    ( 25 @ 15:34 )  TropHS 18    / CK x     / CKMB x      ( 25 @ 13:21 )  TropHS 18    / CK x     / CKMB x      
55 year old male patient with chronic HFrEF, NICM (LVEF < 20%, LVIDd 7.36cm), NYHA Class II, HTN, HLD, and CVA (10/1/23), who presented to [facility] with complaints of cough. Reports that he has been in his usual state of health until a little over a week ago, when he started having productive cough and was treated with Augmentin for presumed bronchitis. Since then the sputum has cleared up, but continues to have SOB and noted increased LE edema x 1 day. Reports that he was also doing a lot of heavy lifting moving furniture from one apartment to the other and was exerting himself more than usual. Also admits to increased stress surrounding this unanticipated move. Otherwise denies any chest pain or palpitations. Compliant with medications and recently had refills done. No recent presyncope or syncope.     PAST MEDICAL & SURGICAL HISTORY:  CHF, chronic  Hypertension  Hyperlipidemia  S/P hernia repair    REVIEW OF SYSTEMS  General: - fever or chills, + fatigue	  Skin/Breast: - rashes  Ophthalmologic: - blurred vision	  ENMT: - sore throat  Respiratory and Thorax: + cough  Cardiovascular: - chest pain or palpitations, + LE edema, + LO  Gastrointestinal: - N/V/D/C  Genitourinary: - dysuria  Musculoskeletal:	 - arthritis  Neurological: - weaknesses  Psychiatric: + anxiety 	    MEDICATIONS  (STANDING):  aspirin enteric coated 81 milliGRAM(s) Oral daily  atorvastatin 40 milliGRAM(s) Oral at bedtime  carvedilol 6.25 milliGRAM(s) Oral every 12 hours  heparin   Injectable 5000 Unit(s) SubCutaneous every 8 hours  spironolactone 25 milliGRAM(s) Oral daily  valsartan 80 milliGRAM(s) Oral <User Schedule>    MEDICATIONS  (PRN):  acetaminophen     Tablet .. 650 milliGRAM(s) Oral every 6 hours PRN Temp greater or equal to 38C (100.4F), Mild Pain (1 - 3)  aluminum hydroxide/magnesium hydroxide/simethicone Suspension 30 milliLiter(s) Oral every 4 hours PRN Dyspepsia  melatonin 3 milliGRAM(s) Oral at bedtime PRN Insomnia  ondansetron Injectable 4 milliGRAM(s) IV Push every 8 hours PRN Nausea and/or Vomiting    Allergies  No Known Allergies    SOCIAL HISTORY: Non smoker, non drinker. Lives in [facility]. 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 no rhonchi  Abdomen: +BS, soft NTND  Extremities: trace LE edema  Neuro: non focal, speech clear, DELCID x 4  Psych: anxious     LABS:                        12.8   6.09  )-----------( 215      ( 2025 06:24 )             38.9     139  |  101  |  28.1[H]  ----------------------------<  98  3.4[L]   |  25.0  |  1.60[H]  Ca    8.4      2025 06:24  Phos  4.3     07-07  Mg     1.8     07-07  TPro  6.4[L]  /  Alb  3.5  /  TBili  1.2  /  DBili  x   /  AST  27  /  ALT  24  /  AlkPhos  59  07-06  LIVER FUNCTIONS - ( 2025 13:21 )  Alb: 3.5 g/dL / Pro: 6.4 g/dL / ALK PHOS: 59 U/L / ALT: 24 U/L / AST: 27 U/L / GGT: x         PT/INR - ( 2025 13:21 )   PT: 15.3 sec;   INR: 1.36 ratio    PTT - ( 2025 13:21 )  PTT:29.4 sec    RADIOLOGY & ADDITIONAL STUDIES:  TTE 2025  CONCLUSIONS:   1. Left ventricular cavity is severely dilated. Left ventricular systolic function is severely decreased with an ejection fraction visually estimated at <20 %.   2. Mildly enlarged right ventricular cavity size and mildly reduced right ventricular systolic function.   3. Left atrium is severely dilated.   4. The right atrium is severely dilated.   5. Trileaflet aortic valve with normal systolic excursion.   6. Moderate to severe mitral regurgitation.   7. Tricuspid Valve is structurally normal with normal leaflet excursion. Mild to moderate tricuspid regurgitation.   8. Estimated pulmonary artery systolic pressure is 43 mmHg, consistent with mild pulmonary hypertension.   9. No pericardial effusion seen.    TTE 10/3/2023  Summary:   1. Left ventricular ejection fraction, by visual estimation, is <20%.   2. Severely decreased global left ventricular systolic function.   3. Severely enlarged left atrium.   4. Severely enlarged right atrium.   5. Mildly reduced RV systolic function.   6. Moderately enlarged right ventricle.   7. Moderate mitral valve regurgitation.   8. Mild-moderate tricuspid regurgitation.   9. Sclerotic aortic valve with normal opening.  10. Estimated pulmonary artery systolic pressure is 35.5 mmHg assuming a   right atrial pressure of 3 mmHg, which is consistent with borderline   pulmonary hypertension.  11. Endocardial visualization was enhanced with intravenous echo contrast.    [id number]  SR at 85bpm; QRSD 110ms; PVC; NJ 178ms    CXR 2025  IMPRESSION:  Progression of cardiomegaly with persistent pulmonary congestion.    A/P  55 year old male patient with chronic HFrEF, NICM (LVEF < 20%, LVIDd 7.36cm), NYHA Class II, HTN, HLD, and CVA (10/1/23), who is admitted with an acute on chronic HFrEF exacerbation. EP consulted regarding primary prevention ICD insertion    Was to see Dr. Hernandez in past for this very issue but did not follow through  Narrow QRS on baseline EKG. Infrequent PVCs. Sinus rates in the 80s  Nearing euvolemia now on PO diuretics    - Can consider primary prevention ICD insertion this admission once optimized from HF perspective  - Options include traditional single chamber ICD vs Sub Q or EV ICD given patient's age  - Will discuss, screen and plan accordingly. Patient agreeable to have an ICD inserted this admission if able to.     
                                             [facility]                                              CARDIOLOGY AT [address], [place] [address]                                             Telephone: [phone]. Fax:[phone]                                                       CARDIOLOGY CONSULTATION NOTE                                                                                             History obtained by: Patient and medical record  Community Cardiologist:  HF: Dr. Betancourt    obtained: Yes [  ] No [ x ]  Reason for Consultation: cough   Available out pt records reviewed: Yes [ x ] No [  ]    Chief complaint:    Patient is a 55y old  Male who presents with a chief complaint of cough     HPI:  54 y/o M with hx of chronic systolic heart failure, NICM (LVEF < 20%, LVIDd 7.36cm), HTN, HLD, and recent CVA (10/1/23), who presents to the ED with complaints of cough. He has been having a cough for the past eight days with brownish sputum. His sputum was clearing up as the days progressed. Patient went to urgent care when the cough started and was told he had bronchitis, then prescribed augmentin. Patient has been complaint with his abx but had no improvement in cough. Pt has been using albuterol inhaler with mild relief. Patient denies chest pain, chest pressure, SOB, LO, abdominal, N/V/D, and recent sick contact.     CARDIAC TESTING   ECHO: < from: JONNA Echo Doppler (10.04.23 @ 12:42) >   1. Left ventricular ejection fraction, by visual estimation, is <20%.   2. Severely decreased global left ventricular systolic function.   3. Increased LV wall thickness.   4. There is mild eccentric left ventricular hypertrophy.   5. Mildly enlarged right ventricle.   6. Moderately reduced RV systolic function.   7. Mild thickening of the anterior and posterior mitral valve leaflets.   8. Moderate mitral valve regurgitation.   9. Moderate tricuspid regurgitation.  10. Mild pulmonic valve regurgitation.    < end of copied text >    TTE 10/3/23: LVEF <20%, LVIDd 7.36cm, mod RVE with mild RV dysfunction, severe JORGE ALBERTO, mod MR, mild-mod TR, est PASP 35.5 mmHg.  ?  TTE 6/16/22: LVEF <20%, LVIDd 6.85cm, moderate RVE with moderately reduced RV systolic function, severe biatrial enlargement, moderate to sever MR, moderate TR, PASP 38.2mmHg (RAP 8).  ?    STRESS:  none     CATH: [facility] 6/2022 normal coronaries      ELECTROPHYSIOLOGY: none     PAST MEDICAL HISTORY  CHF, chronic  Hypertension  Hyperlipidemia    PAST SURGICAL HISTORY  S/P hernia repair    SOCIAL HISTORY:  Denies smoking/alcohol/drugs  CIGARETTES:     ALCOHOL:  DRUGS:    FAMILY HISTORY:  Family history of stroke (Mother)    Family History of Cardiovascular Disease:  Yes [  ] No [ x ]  Coronary Artery Disease in first degree relative: Yes [  ] No [ x ]  Sudden Cardiac Death in First degree relative: Yes [  ] No [ x ]    HOME MEDICATIONS:  aspirin 81 mg oral delayed release tablet: 1 tab(s) orally once a day (15 Braulio 2022 09:14)  Lipitor 40 mg oral tablet: 1 tab(s) orally once a day (15 Braulio 2022 09:14)  metoprolol succinate 50 mg oral tablet, extended release: 1 tab(s) orally once a day (15 Braulio 2022 09:14)  Pepcid 20 mg oral tablet: 1 tab(s) orally 2 times a day (15 Braulio 2022 09:14)  torsemide 20 mg oral tablet: 1 tab(s) orally 2 times a day (01 Oct 2023 05:13)    CURRENT CARDIAC MEDICATIONS:  furosemide   Injectable 40 milliGRAM(s) IV Push two times a day  valsartan 160 milliGRAM(s) Oral daily    CURRENT OTHER MEDICATIONS:    ALLERGIES:   No Known Allergies    REVIEW OF SYMPTOMS:   CONSTITUTIONAL: No fever, no chills, no weight loss, no weight gain, no fatigue   ENMT:  No vertigo; No sinus or throat pain  NECK: No pain or stiffness  CARDIOVASCULAR: No chest pain, no dyspnea, no syncope/presyncope, no palpitations, no dizziness, no Orthopnea, no Paroxsymal nocturnal dyspnea  RESPIRATORY: +Shortness of breath, no cough, no wheezing  : No dysuria, no hematuria   GI: No dark color stool, no nausea, no diarrhea, no constipation, no abdominal pain   NEURO: No headache, no slurred speech   MUSCULOSKELETAL: No joint pain or swelling; No muscle, back, or extremity pain  PSYCH: No agitation, no anxiety.    ALL OTHER REVIEW OF SYSTEMS ARE NEGATIVE.    VITAL SIGNS:  T(C): 36.3 (07-06-25 @ 11:24), Max: 36.3 (07-06-25 @ 11:24)  T(F): 97.4 (07-06-25 @ 11:24), Max: 97.4 (07-06-25 @ 11:24)  HR: 74 (07-06-25 @ 11:24) (74 - 74)  BP: 148/84 (07-06-25 @ 11:24) (148/84 - 148/84)  RR: 20 (07-06-25 @ 11:24) (20 - 20)  SpO2: 100% (07-06-25 @ 11:24) (100% - 100%)    INTAKE AND OUTPUT    PHYSICAL EXAM  Constitutional: Comfortable . No acute distress.   HEENT: Atraumatic and normocephalic , neck is supple . no JVD. No carotid bruit.  CNS: A&Ox3. No focal deficits.   Respiratory: CTAB, unlabored   Cardiovascular: RRR normal s1 s2. No murmur. No rubs or gallop.  Gastrointestinal: Soft, non-tender. +Bowel sounds.   Extremities: 2+ Peripheral Pulses, No clubbing, cyanosis, or edema  Psychiatric: Calm . no agitation.   Skin: Warm and dry, no ulcers on extremities     LABS:             12.8   5.33  )-----------( 196      ( 06 Jul 2025 13:21 )             40.0     07-06    137  |  102  |  23.3[H]  ----------------------------<  89  4.0   |  23.0  |  1.31[H]    Ca    8.4      06 Jul 2025 13:21  TPro  6.4[L]  /  Alb  3.5  /  TBili  1.2  /  DBili  x   /  AST  27  /  ALT  24  /  AlkPhos  59  07-06  PT/INR - ( 06 Jul 2025 13:21 )   PT: 15.3 sec;   INR: 1.36 ratio    PTT - ( 06 Jul 2025 13:21 )  PTT:29.4 sec    Urinalysis Basic - ( 06 Jul 2025 13:21 )  Color: x / Appearance: x / SG: x / pH: x  Gluc: 89 mg/dL / Ketone: x  / Bili: x / Urobili: x   Blood: x / Protein: x / Nitrite: x   Leuk Esterase: x / RBC: x / WBC x   Sq Epi: x / Non Sq Epi: x / Bacteria: x    INTERPRETATION OF TELEMETRY: NSR     ECG: NSR   Prior ECG: Yes [  ] No [ x ]    RADIOLOGY & ADDITIONAL STUDIES:    X-ray:    CT scan:   MRI:   US:

## 2025-07-07 NOTE — PROGRESS NOTE ADULT - TIME BILLING
chart review, patient evaluation, documentation, coordination of care and discussion with nurses, consultants, ACP, social work, case management.

## 2025-07-07 NOTE — CONSULT NOTE ADULT - ASSESSMENT
HF: Dr. Betancourt    56 y/o M with PMH of dilated NICM/HFrEF (LVEF <20%, LVIDd 7.36cm), HTN, HLD, and CVA (10/1/23), who presented to the ED with ADHF.     His last HF hospitalization was ~3 years ago. He reports recently moving to an apartment with his mother which he states there was "paranormal activity." This has caused him a lot of stress over the past 2 months. He reports worsening SOB, cough, and LE edema. He also had a lapse in his health insurance so was unable to obtain Entresto or Farxiga.    In the ED his labs were notable for H/H 12.8/40, BUN/Cr 23/1.31, hsTrop negative x2, LFTs normal, and pro-BNP 5734.     Cardiac Studies:   HF: Dr. Betancourt    54 y/o M with PMH of dilated NICM/HFrEF (LVEF <20%, LVIDd 7.36cm), HTN, HLD, and CVA (10/1/23), who presented to the ED with ADHF.     His last HF hospitalization was ~3 years ago. He reports recently moving to an apartment with his mother which he states there was "paranormal activity." This has caused him a lot of stress over the past 2 months. He reports worsening SOB, cough, and LE edema. He also had a lapse in his health insurance so was unable to obtain Entresto or Farxiga.    In the ED his labs were notable for H/H 12.8/40, BUN/Cr 23/1.31, hsTrop negative x2, LFTs normal, and pro-BNP 5734.     Cardiac Studies:  7/7/25 TTE: LVEF <20%, LVIDd 8.2 cm, mild RVE with mild RV dysfunction, severe JORGE ALBERTO, mod-severe MR, mild-mod TR.

## 2025-07-07 NOTE — CONSULT NOTE ADULT - PROBLEM SELECTOR RECOMMENDATION 9
- hx of NICM   - pt presented with cough   - ProBNP 5734  - Start diuresis with lasix 40 mg IV Push BID   - GDMT: Continue with valsartan 160 mg PO daily (unable to afford entresto), coreg 6.25 mg BID PO.  Farxiga to be resumed on d/c.   - pending TTE for evaluation of cardiac function and any valvular abnormalities  - Monitor on telemetry.  Strict i/o and daily weights.  Keep K > 4, Mg > 2.  Monitor renal function with ongoing diuresis.
- Etiology: Reportedly NICM (per patient he had a LHC in 2022 at [facility] which showed no CAD. Will try to obtain the report as an outpatient. If no h/o ischemic w/u, will obtain CCTA).  - Clinically close to euvolemic w/ warm extremities.   - GDMT: Will add 80 mg of valsartan in the evening and continue 160 mg in the AM. Continue Coreg 6.25 mg BID. Start spironolactone 25 mg daily. Entresto and Farxiga are cost prohibitive without insurance.  - Needs social work evaluation for insurance.  - Diuretics: Will hold diuretics today and consider starting Lasix 40 mg PO daily tomorrow.  - Please document strict I&Os and daily standing weight.  - Keep K > 4 and Mag > 2.  - Device: None. EP consulted for primary prevention ICD evaluation.  - HF education provided including lifestyle changes (low Na diet, fluid restriction, increase physical activity), current clinical condition, natural progression and prognosis.  - Will consider RHC prior to discharge for risk stratification.
no

## 2025-07-08 ENCOUNTER — TRANSCRIPTION ENCOUNTER (OUTPATIENT)
Age: 55
End: 2025-07-08

## 2025-07-08 VITALS
SYSTOLIC BLOOD PRESSURE: 146 MMHG | RESPIRATION RATE: 18 BRPM | TEMPERATURE: 98 F | OXYGEN SATURATION: 98 % | HEART RATE: 90 BPM | DIASTOLIC BLOOD PRESSURE: 103 MMHG

## 2025-07-08 LAB
ANION GAP SERPL CALC-SCNC: 12 MMOL/L — SIGNIFICANT CHANGE UP (ref 5–17)
BLD GP AB SCN SERPL QL: SIGNIFICANT CHANGE UP
BUN SERPL-MCNC: 27.5 MG/DL — HIGH (ref 8–20)
CALCIUM SERPL-MCNC: 8.7 MG/DL — SIGNIFICANT CHANGE UP (ref 8.4–10.5)
CHLORIDE SERPL-SCNC: 101 MMOL/L — SIGNIFICANT CHANGE UP (ref 96–108)
CO2 SERPL-SCNC: 27 MMOL/L — SIGNIFICANT CHANGE UP (ref 22–29)
CREAT SERPL-MCNC: 1.31 MG/DL — HIGH (ref 0.5–1.3)
EGFR: 65 ML/MIN/1.73M2 — SIGNIFICANT CHANGE UP
EGFR: 65 ML/MIN/1.73M2 — SIGNIFICANT CHANGE UP
FERRITIN SERPL-MCNC: 94 NG/ML — SIGNIFICANT CHANGE UP (ref 30–400)
GLUCOSE SERPL-MCNC: 86 MG/DL — SIGNIFICANT CHANGE UP (ref 70–99)
IRON SATN MFR SERPL: 12 % — LOW (ref 16–55)
IRON SATN MFR SERPL: 43 UG/DL — LOW (ref 59–158)
MAGNESIUM SERPL-MCNC: 2.2 MG/DL — SIGNIFICANT CHANGE UP (ref 1.6–2.6)
PHOSPHATE SERPL-MCNC: 4 MG/DL — SIGNIFICANT CHANGE UP (ref 2.4–4.7)
POTASSIUM SERPL-MCNC: 3.7 MMOL/L — SIGNIFICANT CHANGE UP (ref 3.5–5.3)
POTASSIUM SERPL-SCNC: 3.7 MMOL/L — SIGNIFICANT CHANGE UP (ref 3.5–5.3)
SODIUM SERPL-SCNC: 140 MMOL/L — SIGNIFICANT CHANGE UP (ref 135–145)
TIBC SERPL-MCNC: 350 UG/DL — SIGNIFICANT CHANGE UP (ref 220–430)
TRANSFERRIN SERPL-MCNC: 245 MG/DL — SIGNIFICANT CHANGE UP (ref 180–329)

## 2025-07-08 PROCEDURE — 84484 ASSAY OF TROPONIN QUANT: CPT

## 2025-07-08 PROCEDURE — 87637 SARSCOV2&INF A&B&RSV AMP PRB: CPT

## 2025-07-08 PROCEDURE — 83540 ASSAY OF IRON: CPT

## 2025-07-08 PROCEDURE — 83880 ASSAY OF NATRIURETIC PEPTIDE: CPT

## 2025-07-08 PROCEDURE — 84100 ASSAY OF PHOSPHORUS: CPT

## 2025-07-08 PROCEDURE — 0225U NFCT DS DNA&RNA 21 SARSCOV2: CPT

## 2025-07-08 PROCEDURE — 84466 ASSAY OF TRANSFERRIN: CPT

## 2025-07-08 PROCEDURE — 99239 HOSP IP/OBS DSCHRG MGMT >30: CPT

## 2025-07-08 PROCEDURE — 86900 BLOOD TYPING SEROLOGIC ABO: CPT

## 2025-07-08 PROCEDURE — 0241U: CPT

## 2025-07-08 PROCEDURE — 83550 IRON BINDING TEST: CPT

## 2025-07-08 PROCEDURE — 85025 COMPLETE CBC W/AUTO DIFF WBC: CPT

## 2025-07-08 PROCEDURE — C8929: CPT

## 2025-07-08 PROCEDURE — 94640 AIRWAY INHALATION TREATMENT: CPT

## 2025-07-08 PROCEDURE — 85730 THROMBOPLASTIN TIME PARTIAL: CPT

## 2025-07-08 PROCEDURE — 36415 COLL VENOUS BLD VENIPUNCTURE: CPT

## 2025-07-08 PROCEDURE — 86901 BLOOD TYPING SEROLOGIC RH(D): CPT

## 2025-07-08 PROCEDURE — 82728 ASSAY OF FERRITIN: CPT

## 2025-07-08 PROCEDURE — 96374 THER/PROPH/DIAG INJ IV PUSH: CPT

## 2025-07-08 PROCEDURE — 99232 SBSQ HOSP IP/OBS MODERATE 35: CPT

## 2025-07-08 PROCEDURE — 86850 RBC ANTIBODY SCREEN: CPT

## 2025-07-08 PROCEDURE — 83735 ASSAY OF MAGNESIUM: CPT

## 2025-07-08 PROCEDURE — 80048 BASIC METABOLIC PNL TOTAL CA: CPT

## 2025-07-08 PROCEDURE — 99285 EMERGENCY DEPT VISIT HI MDM: CPT | Mod: 25

## 2025-07-08 PROCEDURE — 83690 ASSAY OF LIPASE: CPT

## 2025-07-08 PROCEDURE — 71045 X-RAY EXAM CHEST 1 VIEW: CPT

## 2025-07-08 PROCEDURE — 85027 COMPLETE CBC AUTOMATED: CPT

## 2025-07-08 PROCEDURE — 80053 COMPREHEN METABOLIC PANEL: CPT

## 2025-07-08 PROCEDURE — 93005 ELECTROCARDIOGRAM TRACING: CPT

## 2025-07-08 PROCEDURE — 85610 PROTHROMBIN TIME: CPT

## 2025-07-08 RX ORDER — FUROSEMIDE 10 MG/ML
40 INJECTION INTRAMUSCULAR; INTRAVENOUS DAILY
Refills: 0 | Status: DISCONTINUED | OUTPATIENT
Start: 2025-07-08 | End: 2025-07-08

## 2025-07-08 RX ORDER — SPIRONOLACTONE 25 MG
1 TABLET ORAL
Qty: 30 | Refills: 0
Start: 2025-07-08 | End: 2025-08-06

## 2025-07-08 RX ORDER — FUROSEMIDE 10 MG/ML
1 INJECTION INTRAMUSCULAR; INTRAVENOUS
Qty: 30 | Refills: 0
Start: 2025-07-08 | End: 2025-08-06

## 2025-07-08 RX ADMIN — Medication 25 MILLIGRAM(S): at 05:18

## 2025-07-08 RX ADMIN — FUROSEMIDE 40 MILLIGRAM(S): 10 INJECTION INTRAMUSCULAR; INTRAVENOUS at 12:22

## 2025-07-08 RX ADMIN — Medication 81 MILLIGRAM(S): at 12:21

## 2025-07-08 RX ADMIN — CARVEDILOL 6.25 MILLIGRAM(S): 3.12 TABLET, FILM COATED ORAL at 05:19

## 2025-07-08 RX ADMIN — Medication 160 MILLIGRAM(S): at 05:18

## 2025-07-08 RX ADMIN — HEPARIN SODIUM 5000 UNIT(S): 1000 INJECTION INTRAVENOUS; SUBCUTANEOUS at 05:19

## 2025-07-08 NOTE — DISCHARGE NOTE NURSING/CASE MANAGEMENT/SOCIAL WORK - FINANCIAL ASSISTANCE
[facility] provides services at a reduced cost to those who are determined to be eligible through [facility]’s financial assistance program. Information regarding [facility]’s financial assistance program can be found by going to [email or web address] or by calling [phone].

## 2025-07-08 NOTE — DISCHARGE NOTE PROVIDER - NSDCMRMEDTOKEN_GEN_ALL_CORE_FT
aspirin 81 mg oral delayed release tablet: 1 tab(s) orally once a day  Coreg 6.25 mg oral tablet: 1 tab(s) orally 2 times a day  furosemide 40 mg oral tablet: 1 tab(s) orally once a day  spironolactone 25 mg oral tablet: 1 tab(s) orally once a day  valsartan 160 mg oral tablet: 1 tab(s) orally once a day take at 6AM  valsartan 80 mg oral tablet: 1 tab(s) orally once a day take at 6PM

## 2025-07-08 NOTE — PROGRESS NOTE ADULT - NS ATTEND AMEND GEN_ALL_CORE FT
pt with history of longstanding CMP and LVEF<35% despite GDMT. We discussed the risks and benefits of ICD therapy in detail, including risk of sudden death, as well as long-term implications of ICD therapy.   We reviewed various options for ICD therapy, and I did recommend subcutaneous ICD or EV-ICD for primary prevention. He expressed understanding, but defers ICD implant at this time. He wants to focus on medical management and reconsider ICD therapy as outpatient.
Mr. Parnell is a 56 y/o male with history of chronic systolic heart failure, NICM (LVEF < 20%, LVIDd 7.36cm), HTN, HLD, and recent CVA (10/1/23) who is admitted for HF exacerbation. He initially thought his symptoms were secondary to allergies. His previous HF admission was in 2022. He has otherwise been doing well prior to this. He also does not have health insurance at this time and hence not on entresto.     TTE: LVEF < 20%, LVEDD 8.2cm, mild RV dysfunction, moderate to severe MR, LVOT VTI - 9.4 cm, , RA pressure 8    Echo hemodynamics:   Cardiac output - 3 L/min  Cardiac index - 1.4   SVR - 2693      Assessment:   Non-ischemic cardiomyopathy  Severe LV dilatation and LV dysfunction  Mild RV dysfunction  Hypertension  H/o CVA  Acute kidney injury (baseline creatinine around 1.3)      Plan:   Continue coreg 6.25 mg BID.   Continue valsartan 160 mg in the am and 80 mg in the pm.   Continue aldactone 25 mg daily.   Entresto and farxiga are cost prohibitive as the patient is supposed to have insurance coverage only from August 1.   Patient refused ICD today. Explained about risk of SCD but patient would like to continue medical optimization at this time.   [facility] report to be obtained as outpatient.         Further management per primary team.

## 2025-07-08 NOTE — PROGRESS NOTE ADULT - PROBLEM SELECTOR PLAN 1
- ACC/AHA Stage C, NYHA class II-III  - LVEF < 20%, LVIDd 8.2cm  - Etiology: Reportedly NICM (per patient he had a LHC in 2022 at [facility] which showed no CAD. Will try to obtain the report as an outpatient. If no h/o ischemic w/u, will obtain CCTA).  - Elevated BP, admission ProBNP 5734.   - Clinically appears euvolemic on exam w/ warm extremities.    - GDMT: Continue Valsartan 160 mg in the AM and 80 mg in the PM, Coreg 6.25 mg BID and Spironolactone 25 mg daily. Entresto and Farxiga are cost prohibitive without insurance. Patient working on obtaining insurance. We will attempt to optimize his GDMT as an outpt.  - Diuretics: Lasix 40 mg PO daily for maintenance.   - Device: None. EP consulted for primary prevention ICD evaluation. Patient was scheduled for extravascular ICD this morning but changed his mind. Will f/u with Dr. Freire as an outpt.  - Please document strict I&Os and daily standing weight.  - Keep K > 4 and Mag > 2.  - HF education provided including lifestyle changes (low Na diet, fluid restriction, increase physical activity), current clinical condition, natural progression and prognosis.  - HF team to sign off. Patient stable for discharge from our perspective. Advise HFU visit in our [place] clinic next week.

## 2025-07-08 NOTE — DISCHARGE NOTE PROVIDER - HOSPITAL COURSE
54 yo M PMHx HFrEF, NICM (LVEF < 20%, LVIDd 7.36cm), HTN, HLD, and recent CVA (10/1/23), who presents to the ED with complaints of cough as well as worsening SOB on exertion and LE edema. BNP up to 5734. CXR with pulm vascular congestion concerning for CHF exacerbation. Cardio consulted. Admitted for diuresis and work up. Pt started  on IV diuresis with significant improvement in edema and pulmonary congestion. Repeat TTE still shows EF <20%, moderate to severe MR. Per heart failure team MR can be reassessed as outpatient after restartingdiuresis and adjusting BP meds. Given overall improvement on IV diuresis pt was transitioned to PO lasix and renal function remained intact. HF education provided including lifestyle changes (low Na diet, fluid restriction, increase physical activity), current clinical condition, natural progression and prognosis. Pt was recommended ICD placement and after a lengthy discussion between him and EP regarding ICD insertion the patient endorsed wishing to postpone his decision and is currently declining inpatient ICD insertion. Wishes to go home and "work some things out" before seeing EP as outpatient. No indication for WCD and no objection to discharge per EP. Pt given all information for follow up. He is medically stable and cleared for discharge madisyn today.

## 2025-07-08 NOTE — PROGRESS NOTE ADULT - ASSESSMENT
HF: Dr. Betancourt    54 y/o M with PMH of dilated NICM/HFrEF (LVEF <20%, LVIDd 7.36cm), HTN, HLD, and CVA (10/1/23), who presented to the ED with ADHF.     His last HF hospitalization was ~3 years ago. He reports recently moving to an apartment with his mother which he states there was "paranormal activity." This has caused him a lot of stress over the past 2 months. He reports worsening SOB, cough, and LE edema. He also had a lapse in his health insurance so was unable to obtain Entresto or Farxiga.    In the ED his labs were notable for H/H 12.8/40, BUN/Cr 23/1.31, hsTrop negative x2, LFTs normal, and pro-BNP 5734.     Cardiac Studies:  7/7/25 TTE: LVEF <20%, LVIDd 8.2 cm, mild RVE with mild RV dysfunction, severe JORGE ALBERTO, mod-severe MR, mild-mod TR.

## 2025-07-08 NOTE — DISCHARGE NOTE PROVIDER - NSDCCPCAREPLAN_GEN_ALL_CORE_FT
PRINCIPAL DISCHARGE DIAGNOSIS  Diagnosis: CHF exacerbation  Assessment and Plan of Treatment:   - continue all cardiac medications as prescribed  - monitor daily fluid intake to take no more than 1.2L per day  - check your daily standing weight every morning and if you notice consistent increase in weight or having lower leg swelling or shortness of breath then come back to the ER  - follow up with your cardiologist or [facility] heart failure team   - follow up with EP as well

## 2025-07-08 NOTE — DISCHARGE NOTE NURSING/CASE MANAGEMENT/SOCIAL WORK - PATIENT PORTAL LINK FT
You can access the FollowMyHealth Patient Portal offered by [facility] by registering at the following website: [email or web address]. By joining [employer]’s FollowMyHealth portal, you will also be able to view your health information using other applications (apps) compatible with our system.

## 2025-07-08 NOTE — DISCHARGE NOTE PROVIDER - CARE PROVIDER_API CALL
Rizwan Freire  Clinical Cardiac Electrophysiology  [address], [address]  [place] [address]  Phone: [phone]  Fax: [phone]  Follow Up Time:     Kuldeep Landrum  Internal Medicine  [address] [place] [id number]  Phone: [phone]  Fax: [phone]  Follow Up Time:

## 2025-07-08 NOTE — DISCHARGE NOTE PROVIDER - NSDCFUSCHEDAPPT_GEN_ALL_CORE_FT
[facility] Physician [facility]  CARDIOLOGY [address]  Scheduled Appointment: 08/19/2025

## 2025-07-08 NOTE — DISCHARGE NOTE PROVIDER - NSDCCPGOAL_GEN_ALL_CORE_FT
From: Ronit Johansen  To: Katherine Gannon NP  Sent: 9/22/2017 11:21 AM CDT  Subject: Ankle pain    Niall Murphy,    I know there was a mention of placing a consult for a podiatrist? I didn't know if it was possible to just get an xry or MRI of bilateral ankles while waiting? There are some days where I almost fall down the stairs because my ankle gave out. My right one has crepitus and the left does too, but not as bad. Please let me know what you think. Thanks!!     Ronit   To get better and follow your care plan as instructed.

## 2025-07-08 NOTE — DISCHARGE NOTE PROVIDER - ATTENDING DISCHARGE PHYSICAL EXAMINATION:
T(C): 36.6 (07-08-25 @ 07:34), Max: 36.8 (07-07-25 @ 20:00)  HR: 83 (07-08-25 @ 12:16) (77 - 90)  BP: 148/106 (07-08-25 @ 12:16) (137/101 - 155/107)  RR: 18 (07-08-25 @ 12:16) (16 - 18)  SpO2: 96% (07-08-25 @ 12:16) (93% - 97%)    CONSTITUTIONAL: no apparent distress  EYES: PERRLA, EOMI, non-icteric  ENMT: Oral mucosa with moist membranes  RESP: No respiratory distress, clear to auscultation bilaterally, no wheezes or crackles  CV: RRR, +S1S2, no peripheral edema  GI: Soft, NT, ND  PSYCH: A+O x 3, mood and affect appropriate  NEURO: Cooperative, upper and lower motor function grossly intact bilaterally, sensation grossly intact throughout

## 2025-07-08 NOTE — PROGRESS NOTE ADULT - SUBJECTIVE AND OBJECTIVE BOX
[facility] Division of Medicine    SUBJECTIVE / OVERNIGHT EVENTS:   No overnight events.   Pt seen at the bedside earlier in the day.  Endorses feeling better in terms of his breathing and LE edema.  All other systems reviewed and are negative.    MEDICATIONS  (STANDING):  aspirin enteric coated 81 milliGRAM(s) Oral daily  atorvastatin 40 milliGRAM(s) Oral at bedtime  carvedilol 6.25 milliGRAM(s) Oral every 12 hours  heparin   Injectable 5000 Unit(s) SubCutaneous every 8 hours  spironolactone 25 milliGRAM(s) Oral daily  valsartan 80 milliGRAM(s) Oral <User Schedule>    MEDICATIONS  (PRN):  acetaminophen     Tablet .. 650 milliGRAM(s) Oral every 6 hours PRN Temp greater or equal to 38C (100.4F), Mild Pain (1 - 3)  aluminum hydroxide/magnesium hydroxide/simethicone Suspension 30 milliLiter(s) Oral every 4 hours PRN Dyspepsia  melatonin 3 milliGRAM(s) Oral at bedtime PRN Insomnia  ondansetron Injectable 4 milliGRAM(s) IV Push every 8 hours PRN Nausea and/or Vomiting      I&O's Summary    06 Jul 2025 07:01  -  07 Jul 2025 07:00  --------------------------------------------------------  IN: 480 mL / OUT: 600 mL / NET: -120 mL        acetaminophen     Tablet .. 650 milliGRAM(s) Oral every 6 hours PRN  aluminum hydroxide/magnesium hydroxide/simethicone Suspension 30 milliLiter(s) Oral every 4 hours PRN  aspirin enteric coated 81 milliGRAM(s) Oral daily  atorvastatin 40 milliGRAM(s) Oral at bedtime  carvedilol 6.25 milliGRAM(s) Oral every 12 hours  heparin   Injectable 5000 Unit(s) SubCutaneous every 8 hours  melatonin 3 milliGRAM(s) Oral at bedtime PRN  ondansetron Injectable 4 milliGRAM(s) IV Push every 8 hours PRN  spironolactone 25 milliGRAM(s) Oral daily  valsartan 80 milliGRAM(s) Oral <User Schedule>      PHYSICAL EXAM:  Vital Signs Last 24 Hrs  T(C): 36.4 (07 Jul 2025 11:36), Max: 36.7 (07 Jul 2025 05:00)  T(F): 97.6 (07 Jul 2025 11:36), Max: 98.1 (07 Jul 2025 05:00)  HR: 77 (07 Jul 2025 13:35) (77 - 97)  BP: 155/107 (07 Jul 2025 13:35) (136/96 - 159/117)  BP(mean): --  RR: 18 (07 Jul 2025 13:35) (15 - 18)  SpO2: 93% (07 Jul 2025 13:35) (93% - 99%)    Parameters below as of 07 Jul 2025 13:35  Patient On (Oxygen Delivery Method): room air          CONSTITUTIONAL: no apparent distress  EYES: PERRLA  ENMT: Oral mucosa with moist membranes  RESP: No respiratory distress, clear to auscultation bilaterally, no crackles or wheezes  CV: RRR, +S1S2, no LE edema  GI: Soft, NT, ND  PSYCH: A+O x 3  NEURO: Cooperative, moves upper and lower extremities spontaneously, sensation grossly intact throughout      LABS:                        12.8   6.09  )-----------( 215      ( 07 Jul 2025 06:24 )             38.9     07-07    139  |  101  |  28.1[H]  ----------------------------<  98  3.4[L]   |  25.0  |  1.60[H]    Ca    8.4      07 Jul 2025 06:24  Phos  4.3     07-07  Mg     1.8     07-07    TPro  6.4[L]  /  Alb  3.5  /  TBili  1.2  /  DBili  x   /  AST  27  /  ALT  24  /  AlkPhos  59  07-06    PT/INR - ( 06 Jul 2025 13:21 )   PT: 15.3 sec;   INR: 1.36 ratio         PTT - ( 06 Jul 2025 13:21 )  PTT:29.4 sec      Urinalysis Basic - ( 07 Jul 2025 06:24 )    Color: x / Appearance: x / SG: x / pH: x  Gluc: 98 mg/dL / Ketone: x  / Bili: x / Urobili: x   Blood: x / Protein: x / Nitrite: x   Leuk Esterase: x / RBC: x / WBC x   Sq Epi: x / Non Sq Epi: x / Bacteria: x        CAPILLARY BLOOD GLUCOSE          IMAGING:                                  
Patient seen today in bed in Cath lab holding area in anticipation for ICD insertion. After discussion patient now declining inpatient ICD insertion at this time.     TELE: SR no events.     MEDICATIONS  (STANDING):  aspirin enteric coated 81 milliGRAM(s) Oral daily  carvedilol 6.25 milliGRAM(s) Oral every 12 hours  spironolactone 25 milliGRAM(s) Oral daily  valsartan 80 milliGRAM(s) Oral <User Schedule>  valsartan 160 milliGRAM(s) Oral <User Schedule>    MEDICATIONS  (PRN):  acetaminophen     Tablet .. 650 milliGRAM(s) Oral every 6 hours PRN Temp greater or equal to 38C (100.4F), Mild Pain (1 - 3)  aluminum hydroxide/magnesium hydroxide/simethicone Suspension 30 milliLiter(s) Oral every 4 hours PRN Dyspepsia  melatonin 3 milliGRAM(s) Oral at bedtime PRN Insomnia  ondansetron Injectable 4 milliGRAM(s) IV Push every 8 hours PRN Nausea and/or Vomiting    Allergies  No Known Allergies    PAST MEDICAL & SURGICAL HISTORY:  CHF, chronic  Hypertension  Hyperlipidemia  S/P hernia repair    Vital Signs Last 24 Hrs  T(C): 36.6 (08 Jul 2025 07:34), Max: 36.8 (07 Jul 2025 20:00)  T(F): 97.9 (08 Jul 2025 07:34), Max: 98.3 (07 Jul 2025 20:00)  HR: 86 (08 Jul 2025 07:34) (77 - 90)  BP: 144/92 (08 Jul 2025 07:34) (137/101 - 155/107)  RR: 18 (08 Jul 2025 07:34) (16 - 18)  SpO2: 96% (08 Jul 2025 07:34) (93% - 97%)    Physical Exam:  Constitutional: NAD, AAOx3  Cardiovascular: +S1S2 RRR  Pulmonary: CTA b/l, unlabored  GI: soft NTND +BS  Extremities: no pedal edema,   Neuro: non focal, DELCID x4    LABS:                        12.8   6.09  )-----------( 215      ( 07 Jul 2025 06:24 )             38.9     140  |  101  |  27.5[H]  ----------------------------<  86  3.7   |  27.0  |  1.31[H]  Ca    8.7      08 Jul 2025 06:01  Phos  4.0     07-08  Mg     2.2     07-08  TPro  6.4[L]  /  Alb  3.5  /  TBili  1.2  /  DBili  x   /  AST  27  /  ALT  24  /  AlkPhos  59  07-06  PT/INR - ( 06 Jul 2025 13:21 )   PT: 15.3 sec;   INR: 1.36 ratio    PTT - ( 06 Jul 2025 13:21 )  PTT:29.4 sec    A/P  55 year old male patient with chronic HFrEF, NICM (LVEF < 20%, LVIDd 7.36cm), NYHA Class II, HTN, HLD, and CVA (10/1/23), who is admitted with an acute on chronic HFrEF exacerbation. EP following regarding primary prevention ICD insertion    Qualifies for both Badin Sub Q and Medtronic EV ICD    - After a lengthy discussion regarding ICD insertion the patient wishes to postpone his decision and is currently declining inpatient ICD insertion. Wishes to go home and "work some things out" before seeing EP as outpatient  - No indication for WCD at this time - wore one in 2022   - No objection to discharge planning from EP perspective. Can follow up with Dr. Freire as outpatient once he decides to proceed with ICD insertion  - Will sign off a this time. Discussed with HF NP.     
[facility] Advanced Heart Failure - Progress Note  [address] [place] [address]  Office Phone: [phone]/Fax: [phone]  Service/On Call Phone [phone]    Subjective/Objective: No acute events overnight. Patient denies overt HF symptoms.     Medications:  acetaminophen     Tablet .. 650 milliGRAM(s) Oral every 6 hours PRN  aluminum hydroxide/magnesium hydroxide/simethicone Suspension 30 milliLiter(s) Oral every 4 hours PRN  aspirin enteric coated 81 milliGRAM(s) Oral daily  carvedilol 6.25 milliGRAM(s) Oral every 12 hours  furosemide    Tablet 40 milliGRAM(s) Oral daily  melatonin 3 milliGRAM(s) Oral at bedtime PRN  ondansetron Injectable 4 milliGRAM(s) IV Push every 8 hours PRN  spironolactone 25 milliGRAM(s) Oral daily  valsartan 80 milliGRAM(s) Oral <User Schedule>  valsartan 160 milliGRAM(s) Oral <User Schedule>    Vital Signs Last 24 Hours  T(C): 36.6 (25 @ 07:34), Max: 36.8 (25 @ 20:00)  HR: 86 (25 @ 07:34) (77 - 90)  BP: 144/92 (25 @ 07:34) (137/101 - 155/107)  RR: 18 (25 @ 07:34) (16 - 18)  SpO2: 96% (25 @ 07:34) (93% - 97%)    Weight in k ( @ 07:34)    I&O's Summary  2025 07:01  -  2025 07:00  --------------------------------------------------------  IN: 1060 mL / OUT: 0 mL / NET: 1060 mL    Tele: SR    Physical Exam:  General: In no distress.   HEENT: EOMs intact.  Neck: Neck supple. JVP not elevated.   Chest: Clear to auscultation bilaterally.  CV: RRR. Normal S1 and S2. No murmurs, rub, or gallops. Warm peripherally.  PV: No LE edema noted. Pulses full/equal in all four extremities.  Abdomen: Soft, non-distended, non-tender.  Skin: warm, dry, no rash.  Neurology: Alert and oriented times three. Sensation intact.  Psych: Appropriate affect.    Labs:                        12.8   6.09  )-----------( 215      ( 2025 06:24 )             38.9     07-08    140  |  101  |  27.5[H]  ----------------------------<  86  3.7   |  27.0  |  1.31[H]    Ca    8.7      2025 06:01  Phos  4.0     07-08  Mg     2.2     07-08    TPro  6.4[L]  /  Alb  3.5  /  TBili  1.2  /  DBili  x   /  AST  27  /  ALT  24  /  AlkPhos  59  07-06    PT/INR - ( 2025 13:21 )   PT: 15.3 sec;   INR: 1.36 ratio         PTT - ( 2025 13:21 )  PTT:29.4 sec

## 2025-07-08 NOTE — DISCHARGE NOTE PROVIDER - CARE PROVIDERS DIRECT ADDRESSES
[email or web address]_Unknown

## 2025-07-08 NOTE — DISCHARGE NOTE PROVIDER - NSDCHHPTASSISTHOME_GEN_ALL_CORE
Normal rate, regular rhythm.  Heart sounds S1, S2.  No murmurs, rubs or gallops. Patient Needs Assistance to Leave Residence...

## 2025-07-11 NOTE — CHART NOTE - NSCHARTNOTEFT_GEN_A_CORE
Post-Discharge Medication Review: Completed	  	  Patient's preferred pharmacy was updated in [facility]: [facility]	  	  Patient contacted to offer medication counseling post-discharge. Medication reconciliation completed. Per patient, medications include:	  	  1.	aspirin 81 mg oral delayed release tablet 1 tab(s) orally once a day  2.	Coreg 6.25 mg oral tablet 1 tab(s) orally 2 times a day  3.	furosemide 40 mg oral tablet 1 tab(s) orally once a day  4.	spironolactone 25 mg oral tablet 1 tab(s) orally once a day  5.	valsartan 160 mg oral tablet 1 tab(s) orally once a day take at 6AM  6.	valsartan 80 mg oral tablet 1 tab(s) orally once a day take at 6PM    	  Medication name, indication, administration, side effect, and monitoring reviewed for new medications during post discharge counseling visit with patient. Patient demonstrated understanding. Counseling offered for all medications.	  	    	  Meena Watson, PharmD	  Clinical Pharmacy Specialist, Pharmacy Telehealth Team	  Can be reached via [place] Teams or [phone]

## 2025-07-16 ENCOUNTER — APPOINTMENT (OUTPATIENT)
Dept: HEART FAILURE | Facility: CLINIC | Age: 55
End: 2025-07-16

## 2025-07-27 ENCOUNTER — INPATIENT (INPATIENT)
Facility: HOSPITAL | Age: 55
LOS: 1 days | Discharge: HOME | DRG: 309 | End: 2025-07-29
Attending: STUDENT IN AN ORGANIZED HEALTH CARE EDUCATION/TRAINING PROGRAM | Admitting: HOSPITALIST
Payer: MEDICAID

## 2025-07-27 VITALS
RESPIRATION RATE: 18 BRPM | SYSTOLIC BLOOD PRESSURE: 151 MMHG | HEIGHT: 70.5 IN | HEART RATE: 91 BPM | WEIGHT: 209 LBS | DIASTOLIC BLOOD PRESSURE: 106 MMHG | OXYGEN SATURATION: 99 % | TEMPERATURE: 98 F

## 2025-07-27 DIAGNOSIS — Z98.890 OTHER SPECIFIED POSTPROCEDURAL STATES: Chronic | ICD-10-CM

## 2025-07-27 DIAGNOSIS — I50.9 HEART FAILURE, UNSPECIFIED: ICD-10-CM

## 2025-07-27 LAB
ACANTHOCYTES BLD QL SMEAR: SLIGHT — SIGNIFICANT CHANGE UP
ALBUMIN SERPL ELPH-MCNC: 3.6 G/DL — SIGNIFICANT CHANGE UP (ref 3.3–5.2)
ALP SERPL-CCNC: 67 U/L — SIGNIFICANT CHANGE UP (ref 40–120)
ALT FLD-CCNC: 28 U/L — SIGNIFICANT CHANGE UP
ANION GAP SERPL CALC-SCNC: 13 MMOL/L — SIGNIFICANT CHANGE UP (ref 5–17)
ANISOCYTOSIS BLD QL: SLIGHT — SIGNIFICANT CHANGE UP
APTT BLD: 29.6 SEC — SIGNIFICANT CHANGE UP (ref 26.1–36.8)
AST SERPL-CCNC: 27 U/L — SIGNIFICANT CHANGE UP
BASOPHILS # BLD AUTO: 0.07 K/UL — SIGNIFICANT CHANGE UP (ref 0–0.2)
BASOPHILS NFR BLD AUTO: 1 % — SIGNIFICANT CHANGE UP (ref 0–2)
BILIRUB SERPL-MCNC: 1.5 MG/DL — SIGNIFICANT CHANGE UP (ref 0.4–2)
BUN SERPL-MCNC: 25.3 MG/DL — HIGH (ref 8–20)
CALCIUM SERPL-MCNC: 8.5 MG/DL — SIGNIFICANT CHANGE UP (ref 8.4–10.5)
CHLORIDE SERPL-SCNC: 101 MMOL/L — SIGNIFICANT CHANGE UP (ref 96–108)
CK SERPL-CCNC: 150 U/L — SIGNIFICANT CHANGE UP (ref 30–200)
CO2 SERPL-SCNC: 23 MMOL/L — SIGNIFICANT CHANGE UP (ref 22–29)
CREAT SERPL-MCNC: 1.18 MG/DL — SIGNIFICANT CHANGE UP (ref 0.5–1.3)
EGFR: 73 ML/MIN/1.73M2 — SIGNIFICANT CHANGE UP
EGFR: 73 ML/MIN/1.73M2 — SIGNIFICANT CHANGE UP
EOSINOPHIL # BLD AUTO: 0.01 K/UL — SIGNIFICANT CHANGE UP (ref 0–0.5)
EOSINOPHIL NFR BLD AUTO: 0.1 % — SIGNIFICANT CHANGE UP (ref 0–6)
GLUCOSE SERPL-MCNC: 86 MG/DL — SIGNIFICANT CHANGE UP (ref 70–99)
HCT VFR BLD CALC: 40.9 % — SIGNIFICANT CHANGE UP (ref 39–50)
HGB BLD-MCNC: 13.3 G/DL — SIGNIFICANT CHANGE UP (ref 13–17)
HYPOCHROMIA BLD QL: SLIGHT — SIGNIFICANT CHANGE UP
IMM GRANULOCYTES # BLD AUTO: 0.02 K/UL — SIGNIFICANT CHANGE UP (ref 0–0.07)
IMM GRANULOCYTES NFR BLD AUTO: 0.3 % — SIGNIFICANT CHANGE UP (ref 0–0.9)
INR BLD: 1.45 RATIO — HIGH (ref 0.85–1.16)
LIDOCAIN IGE QN: 30 U/L — SIGNIFICANT CHANGE UP (ref 22–51)
LYMPHOCYTES # BLD AUTO: 0.94 K/UL — LOW (ref 1–3.3)
LYMPHOCYTES NFR BLD AUTO: 13.8 % — SIGNIFICANT CHANGE UP (ref 13–44)
MACROCYTES BLD QL: SLIGHT — SIGNIFICANT CHANGE UP
MCHC RBC-ENTMCNC: 23.9 PG — LOW (ref 27–34)
MCHC RBC-ENTMCNC: 32.5 G/DL — SIGNIFICANT CHANGE UP (ref 32–36)
MCV RBC AUTO: 73.4 FL — LOW (ref 80–100)
MICROCYTES BLD QL: SLIGHT — SIGNIFICANT CHANGE UP
MONOCYTES # BLD AUTO: 0.49 K/UL — SIGNIFICANT CHANGE UP (ref 0–0.9)
MONOCYTES NFR BLD AUTO: 7.2 % — SIGNIFICANT CHANGE UP (ref 2–14)
NEUTROPHILS # BLD AUTO: 5.28 K/UL — SIGNIFICANT CHANGE UP (ref 1.8–7.4)
NEUTROPHILS NFR BLD AUTO: 77.6 % — HIGH (ref 43–77)
NRBC # BLD AUTO: 0 K/UL — SIGNIFICANT CHANGE UP (ref 0–0)
NRBC # FLD: 0 K/UL — SIGNIFICANT CHANGE UP (ref 0–0)
NRBC BLD AUTO-RTO: 0 /100 WBCS — SIGNIFICANT CHANGE UP (ref 0–0)
OVALOCYTES BLD QL SMEAR: SLIGHT — SIGNIFICANT CHANGE UP
PLAT MORPH BLD: NORMAL — SIGNIFICANT CHANGE UP
PLATELET # BLD AUTO: 223 K/UL — SIGNIFICANT CHANGE UP (ref 150–400)
PMV BLD: 10.4 FL — SIGNIFICANT CHANGE UP (ref 7–13)
POIKILOCYTOSIS BLD QL AUTO: SLIGHT — SIGNIFICANT CHANGE UP
POLYCHROMASIA BLD QL SMEAR: SLIGHT — SIGNIFICANT CHANGE UP
POTASSIUM SERPL-MCNC: 4.3 MMOL/L — SIGNIFICANT CHANGE UP (ref 3.5–5.3)
POTASSIUM SERPL-SCNC: 4.3 MMOL/L — SIGNIFICANT CHANGE UP (ref 3.5–5.3)
PROT SERPL-MCNC: 6.8 G/DL — SIGNIFICANT CHANGE UP (ref 6.6–8.7)
PROTHROM AB SERPL-ACNC: 16.8 SEC — HIGH (ref 9.9–13.4)
RBC # BLD: 5.57 M/UL — SIGNIFICANT CHANGE UP (ref 4.2–5.8)
RBC # FLD: 15.4 % — HIGH (ref 10.3–14.5)
RBC BLD AUTO: ABNORMAL
SODIUM SERPL-SCNC: 137 MMOL/L — SIGNIFICANT CHANGE UP (ref 135–145)
TARGETS BLD QL SMEAR: SLIGHT — SIGNIFICANT CHANGE UP
TROPONIN T, HIGH SENSITIVITY RESULT: 26 NG/L — SIGNIFICANT CHANGE UP (ref 0–51)
TROPONIN T, HIGH SENSITIVITY RESULT: 28 NG/L — SIGNIFICANT CHANGE UP (ref 0–51)
TROPONIN T, HIGH SENSITIVITY RESULT: 29 NG/L — SIGNIFICANT CHANGE UP (ref 0–51)
WBC # BLD: 6.81 K/UL — SIGNIFICANT CHANGE UP (ref 3.8–10.5)
WBC # FLD AUTO: 6.81 K/UL — SIGNIFICANT CHANGE UP (ref 3.8–10.5)

## 2025-07-27 PROCEDURE — 71045 X-RAY EXAM CHEST 1 VIEW: CPT

## 2025-07-27 PROCEDURE — 71045 X-RAY EXAM CHEST 1 VIEW: CPT | Mod: 26

## 2025-07-27 PROCEDURE — 99285 EMERGENCY DEPT VISIT HI MDM: CPT

## 2025-07-27 PROCEDURE — 85610 PROTHROMBIN TIME: CPT

## 2025-07-27 PROCEDURE — 85025 COMPLETE CBC W/AUTO DIFF WBC: CPT

## 2025-07-27 PROCEDURE — 85730 THROMBOPLASTIN TIME PARTIAL: CPT

## 2025-07-27 PROCEDURE — 93005 ELECTROCARDIOGRAM TRACING: CPT

## 2025-07-27 PROCEDURE — 82550 ASSAY OF CK (CPK): CPT

## 2025-07-27 PROCEDURE — 84484 ASSAY OF TROPONIN QUANT: CPT

## 2025-07-27 PROCEDURE — 80053 COMPREHEN METABOLIC PANEL: CPT

## 2025-07-27 PROCEDURE — 99223 1ST HOSP IP/OBS HIGH 75: CPT

## 2025-07-27 PROCEDURE — 83690 ASSAY OF LIPASE: CPT

## 2025-07-27 PROCEDURE — 36415 COLL VENOUS BLD VENIPUNCTURE: CPT

## 2025-07-27 RX ORDER — SUCRALFATE 1 G
1 TABLET ORAL ONCE
Refills: 0 | Status: COMPLETED | OUTPATIENT
Start: 2025-07-27 | End: 2025-07-27

## 2025-07-27 RX ORDER — ONDANSETRON HCL/PF 4 MG/2 ML
4 VIAL (ML) INJECTION EVERY 8 HOURS
Refills: 0 | Status: DISCONTINUED | OUTPATIENT
Start: 2025-07-27 | End: 2025-07-29

## 2025-07-27 RX ORDER — MELATONIN 5 MG
3 TABLET ORAL AT BEDTIME
Refills: 0 | Status: DISCONTINUED | OUTPATIENT
Start: 2025-07-27 | End: 2025-07-29

## 2025-07-27 RX ORDER — ACETAMINOPHEN 500 MG/5ML
650 LIQUID (ML) ORAL EVERY 6 HOURS
Refills: 0 | Status: DISCONTINUED | OUTPATIENT
Start: 2025-07-27 | End: 2025-07-29

## 2025-07-27 RX ORDER — MAGNESIUM, ALUMINUM HYDROXIDE 200-200 MG
30 TABLET,CHEWABLE ORAL EVERY 4 HOURS
Refills: 0 | Status: DISCONTINUED | OUTPATIENT
Start: 2025-07-27 | End: 2025-07-29

## 2025-07-27 RX ADMIN — Medication 1 GRAM(S): at 17:30

## 2025-07-27 RX ADMIN — Medication 40 MILLIGRAM(S): at 17:30

## 2025-07-27 RX ADMIN — Medication 3 MILLILITER(S): at 17:33

## 2025-07-28 LAB — TROPONIN T, HIGH SENSITIVITY RESULT: 32 NG/L — SIGNIFICANT CHANGE UP (ref 0–51)

## 2025-07-28 PROCEDURE — 93010 ELECTROCARDIOGRAM REPORT: CPT

## 2025-07-28 PROCEDURE — 80053 COMPREHEN METABOLIC PANEL: CPT

## 2025-07-28 PROCEDURE — 71045 X-RAY EXAM CHEST 1 VIEW: CPT

## 2025-07-28 PROCEDURE — 84484 ASSAY OF TROPONIN QUANT: CPT

## 2025-07-28 PROCEDURE — 85610 PROTHROMBIN TIME: CPT

## 2025-07-28 PROCEDURE — 99233 SBSQ HOSP IP/OBS HIGH 50: CPT

## 2025-07-28 PROCEDURE — 83690 ASSAY OF LIPASE: CPT

## 2025-07-28 PROCEDURE — 85025 COMPLETE CBC W/AUTO DIFF WBC: CPT

## 2025-07-28 PROCEDURE — 85730 THROMBOPLASTIN TIME PARTIAL: CPT

## 2025-07-28 PROCEDURE — 36415 COLL VENOUS BLD VENIPUNCTURE: CPT

## 2025-07-28 PROCEDURE — 93005 ELECTROCARDIOGRAM TRACING: CPT

## 2025-07-28 PROCEDURE — 82550 ASSAY OF CK (CPK): CPT

## 2025-07-28 RX ORDER — ENOXAPARIN SODIUM 100 MG/ML
40 INJECTION SUBCUTANEOUS EVERY 24 HOURS
Refills: 0 | Status: DISCONTINUED | OUTPATIENT
Start: 2025-07-28 | End: 2025-07-29

## 2025-07-28 RX ORDER — CARVEDILOL 3.12 MG/1
6.25 TABLET, FILM COATED ORAL EVERY 12 HOURS
Refills: 0 | Status: DISCONTINUED | OUTPATIENT
Start: 2025-07-28 | End: 2025-07-28

## 2025-07-28 RX ORDER — CARVEDILOL 3.12 MG/1
6.25 TABLET, FILM COATED ORAL ONCE
Refills: 0 | Status: COMPLETED | OUTPATIENT
Start: 2025-07-28 | End: 2025-07-28

## 2025-07-28 RX ORDER — CARVEDILOL 3.12 MG/1
25 TABLET, FILM COATED ORAL EVERY 12 HOURS
Refills: 0 | Status: DISCONTINUED | OUTPATIENT
Start: 2025-07-28 | End: 2025-07-29

## 2025-07-28 RX ORDER — ASPIRIN 325 MG
81 TABLET ORAL DAILY
Refills: 0 | Status: DISCONTINUED | OUTPATIENT
Start: 2025-07-28 | End: 2025-07-29

## 2025-07-28 RX ADMIN — Medication 50 MILLIGRAM(S): at 14:27

## 2025-07-28 RX ADMIN — Medication 80 MILLIGRAM(S): at 05:01

## 2025-07-28 RX ADMIN — Medication 50 MILLIGRAM(S): at 09:03

## 2025-07-28 RX ADMIN — Medication 20 MILLIGRAM(S): at 05:01

## 2025-07-28 RX ADMIN — CARVEDILOL 6.25 MILLIGRAM(S): 3.12 TABLET, FILM COATED ORAL at 05:01

## 2025-07-28 RX ADMIN — Medication 30 MILLILITER(S): at 19:23

## 2025-07-28 RX ADMIN — Medication 650 MILLIGRAM(S): at 20:23

## 2025-07-28 RX ADMIN — CARVEDILOL 6.25 MILLIGRAM(S): 3.12 TABLET, FILM COATED ORAL at 00:47

## 2025-07-28 RX ADMIN — Medication 50 MILLIGRAM(S): at 14:00

## 2025-07-28 RX ADMIN — Medication 80 MILLIGRAM(S): at 17:45

## 2025-07-28 RX ADMIN — Medication 20 MILLIGRAM(S): at 17:46

## 2025-07-28 RX ADMIN — Medication 650 MILLIGRAM(S): at 13:54

## 2025-07-28 RX ADMIN — Medication 650 MILLIGRAM(S): at 12:46

## 2025-07-28 RX ADMIN — ENOXAPARIN SODIUM 40 MILLIGRAM(S): 100 INJECTION SUBCUTANEOUS at 05:01

## 2025-07-28 RX ADMIN — Medication 650 MILLIGRAM(S): at 19:23

## 2025-07-28 RX ADMIN — Medication 60 MILLILITER(S): at 09:03

## 2025-07-28 RX ADMIN — Medication 81 MILLIGRAM(S): at 09:03

## 2025-07-28 RX ADMIN — CARVEDILOL 25 MILLIGRAM(S): 3.12 TABLET, FILM COATED ORAL at 17:45

## 2025-07-28 RX ADMIN — Medication 50 MILLIGRAM(S): at 21:02

## 2025-07-28 RX ADMIN — Medication 30 MILLILITER(S): at 12:47

## 2025-07-29 ENCOUNTER — TRANSCRIPTION ENCOUNTER (OUTPATIENT)
Age: 55
End: 2025-07-29

## 2025-07-29 VITALS — WEIGHT: 207.23 LBS

## 2025-07-29 LAB
ANION GAP SERPL CALC-SCNC: 11 MMOL/L — SIGNIFICANT CHANGE UP (ref 5–17)
BUN SERPL-MCNC: 25.9 MG/DL — HIGH (ref 8–20)
CALCIUM SERPL-MCNC: 8.4 MG/DL — SIGNIFICANT CHANGE UP (ref 8.4–10.5)
CHLORIDE SERPL-SCNC: 104 MMOL/L — SIGNIFICANT CHANGE UP (ref 96–108)
CO2 SERPL-SCNC: 24 MMOL/L — SIGNIFICANT CHANGE UP (ref 22–29)
CREAT SERPL-MCNC: 1.05 MG/DL — SIGNIFICANT CHANGE UP (ref 0.5–1.3)
EGFR: 84 ML/MIN/1.73M2 — SIGNIFICANT CHANGE UP
EGFR: 84 ML/MIN/1.73M2 — SIGNIFICANT CHANGE UP
GLUCOSE SERPL-MCNC: 73 MG/DL — SIGNIFICANT CHANGE UP (ref 70–99)
POTASSIUM SERPL-MCNC: 4.2 MMOL/L — SIGNIFICANT CHANGE UP (ref 3.5–5.3)
POTASSIUM SERPL-SCNC: 4.2 MMOL/L — SIGNIFICANT CHANGE UP (ref 3.5–5.3)
SODIUM SERPL-SCNC: 139 MMOL/L — SIGNIFICANT CHANGE UP (ref 135–145)

## 2025-07-29 PROCEDURE — 84484 ASSAY OF TROPONIN QUANT: CPT

## 2025-07-29 PROCEDURE — 85730 THROMBOPLASTIN TIME PARTIAL: CPT

## 2025-07-29 PROCEDURE — 82550 ASSAY OF CK (CPK): CPT

## 2025-07-29 PROCEDURE — 93005 ELECTROCARDIOGRAM TRACING: CPT

## 2025-07-29 PROCEDURE — 83690 ASSAY OF LIPASE: CPT

## 2025-07-29 PROCEDURE — 99231 SBSQ HOSP IP/OBS SF/LOW 25: CPT

## 2025-07-29 PROCEDURE — 85610 PROTHROMBIN TIME: CPT

## 2025-07-29 PROCEDURE — 99285 EMERGENCY DEPT VISIT HI MDM: CPT | Mod: 25

## 2025-07-29 PROCEDURE — 36415 COLL VENOUS BLD VENIPUNCTURE: CPT

## 2025-07-29 PROCEDURE — 80053 COMPREHEN METABOLIC PANEL: CPT

## 2025-07-29 PROCEDURE — 96374 THER/PROPH/DIAG INJ IV PUSH: CPT

## 2025-07-29 PROCEDURE — 99239 HOSP IP/OBS DSCHRG MGMT >30: CPT

## 2025-07-29 PROCEDURE — 80048 BASIC METABOLIC PNL TOTAL CA: CPT

## 2025-07-29 PROCEDURE — 71045 X-RAY EXAM CHEST 1 VIEW: CPT

## 2025-07-29 PROCEDURE — 85025 COMPLETE CBC W/AUTO DIFF WBC: CPT

## 2025-07-29 RX ORDER — CARVEDILOL 3.12 MG/1
1 TABLET, FILM COATED ORAL
Refills: 0 | DISCHARGE

## 2025-07-29 RX ORDER — CARVEDILOL 3.12 MG/1
1 TABLET, FILM COATED ORAL
Qty: 60 | Refills: 0
Start: 2025-07-29 | End: 2025-08-27

## 2025-07-29 RX ADMIN — Medication 80 MILLIGRAM(S): at 00:46

## 2025-07-29 RX ADMIN — Medication 50 MILLIGRAM(S): at 05:18

## 2025-07-29 RX ADMIN — CARVEDILOL 25 MILLIGRAM(S): 3.12 TABLET, FILM COATED ORAL at 05:18

## 2025-07-29 RX ADMIN — ENOXAPARIN SODIUM 40 MILLIGRAM(S): 100 INJECTION SUBCUTANEOUS at 05:21

## 2025-07-29 RX ADMIN — Medication 20 MILLIGRAM(S): at 05:18

## 2025-08-13 ENCOUNTER — APPOINTMENT (OUTPATIENT)
Dept: HEART FAILURE | Facility: CLINIC | Age: 55
End: 2025-08-13
Payer: MEDICAID

## 2025-08-13 VITALS
OXYGEN SATURATION: 97 % | SYSTOLIC BLOOD PRESSURE: 126 MMHG | HEIGHT: 70.5 IN | DIASTOLIC BLOOD PRESSURE: 78 MMHG | WEIGHT: 207 LBS | HEART RATE: 85 BPM | BODY MASS INDEX: 29.3 KG/M2

## 2025-08-13 DIAGNOSIS — I10 ESSENTIAL (PRIMARY) HYPERTENSION: ICD-10-CM

## 2025-08-13 DIAGNOSIS — I42.8 OTHER CARDIOMYOPATHIES: ICD-10-CM

## 2025-08-13 DIAGNOSIS — I50.20 UNSPECIFIED SYSTOLIC (CONGESTIVE) HEART FAILURE: ICD-10-CM

## 2025-08-13 DIAGNOSIS — E78.5 HYPERLIPIDEMIA, UNSPECIFIED: ICD-10-CM

## 2025-08-13 PROCEDURE — 99214 OFFICE O/P EST MOD 30 MIN: CPT

## 2025-08-18 RX ORDER — SACUBITRIL AND VALSARTAN 49; 51 MG/1; MG/1
49-51 TABLET, FILM COATED ORAL
Qty: 60 | Refills: 2 | Status: ACTIVE | COMMUNITY
Start: 2025-08-13 | End: 1900-01-01

## 2025-08-19 ENCOUNTER — APPOINTMENT (OUTPATIENT)
Dept: CARDIOLOGY | Facility: CLINIC | Age: 55
End: 2025-08-19

## 2025-08-26 ENCOUNTER — APPOINTMENT (OUTPATIENT)
Dept: HEART FAILURE | Facility: CLINIC | Age: 55
End: 2025-08-26

## 2025-08-27 ENCOUNTER — APPOINTMENT (OUTPATIENT)
Dept: HEART FAILURE | Facility: CLINIC | Age: 55
End: 2025-08-27
Payer: MEDICAID

## 2025-08-27 VITALS
OXYGEN SATURATION: 97 % | HEART RATE: 95 BPM | SYSTOLIC BLOOD PRESSURE: 150 MMHG | WEIGHT: 213 LBS | HEIGHT: 70.5 IN | BODY MASS INDEX: 30.16 KG/M2 | DIASTOLIC BLOOD PRESSURE: 110 MMHG

## 2025-08-27 VITALS — SYSTOLIC BLOOD PRESSURE: 148 MMHG | DIASTOLIC BLOOD PRESSURE: 100 MMHG

## 2025-08-27 DIAGNOSIS — I10 ESSENTIAL (PRIMARY) HYPERTENSION: ICD-10-CM

## 2025-08-27 DIAGNOSIS — I50.20 UNSPECIFIED SYSTOLIC (CONGESTIVE) HEART FAILURE: ICD-10-CM

## 2025-08-27 DIAGNOSIS — I42.8 OTHER CARDIOMYOPATHIES: ICD-10-CM

## 2025-08-27 PROCEDURE — 99214 OFFICE O/P EST MOD 30 MIN: CPT

## 2025-08-27 RX ORDER — FUROSEMIDE 40 MG/1
40 TABLET ORAL DAILY
Qty: 30 | Refills: 5 | Status: ACTIVE | COMMUNITY
Start: 2025-08-27 | End: 1900-01-01

## 2025-09-10 ENCOUNTER — APPOINTMENT (OUTPATIENT)
Dept: HEART FAILURE | Facility: CLINIC | Age: 55
End: 2025-09-10
Payer: MEDICAID

## 2025-09-10 VITALS
OXYGEN SATURATION: 95 % | WEIGHT: 210 LBS | HEIGHT: 70.5 IN | BODY MASS INDEX: 29.73 KG/M2 | HEART RATE: 82 BPM | DIASTOLIC BLOOD PRESSURE: 82 MMHG | SYSTOLIC BLOOD PRESSURE: 136 MMHG

## 2025-09-10 DIAGNOSIS — I10 ESSENTIAL (PRIMARY) HYPERTENSION: ICD-10-CM

## 2025-09-10 PROCEDURE — 99214 OFFICE O/P EST MOD 30 MIN: CPT

## 2025-09-11 DIAGNOSIS — I42.8 OTHER CARDIOMYOPATHIES: ICD-10-CM

## 2025-09-11 DIAGNOSIS — I50.20 UNSPECIFIED SYSTOLIC (CONGESTIVE) HEART FAILURE: ICD-10-CM

## 2025-09-11 RX ORDER — SPIRONOLACTONE 25 MG/1
25 TABLET ORAL
Qty: 30 | Refills: 1 | Status: ACTIVE | COMMUNITY
Start: 2025-09-11 | End: 1900-01-01